# Patient Record
Sex: FEMALE | Race: WHITE | NOT HISPANIC OR LATINO | Employment: OTHER | URBAN - NONMETROPOLITAN AREA
[De-identification: names, ages, dates, MRNs, and addresses within clinical notes are randomized per-mention and may not be internally consistent; named-entity substitution may affect disease eponyms.]

---

## 2020-07-28 ENCOUNTER — APPOINTMENT (OUTPATIENT)
Dept: MRI IMAGING | Facility: OTHER | Age: 85
End: 2020-07-28
Attending: EMERGENCY MEDICINE
Payer: MEDICARE

## 2020-07-28 ENCOUNTER — APPOINTMENT (OUTPATIENT)
Dept: CT IMAGING | Facility: OTHER | Age: 85
End: 2020-07-28
Attending: EMERGENCY MEDICINE
Payer: MEDICARE

## 2020-07-28 ENCOUNTER — HOSPITAL ENCOUNTER (OUTPATIENT)
Facility: OTHER | Age: 85
Setting detail: OBSERVATION
Discharge: HOME | End: 2020-07-30
Attending: EMERGENCY MEDICINE | Admitting: INTERNAL MEDICINE
Payer: MEDICARE

## 2020-07-28 ENCOUNTER — APPOINTMENT (OUTPATIENT)
Dept: GENERAL RADIOLOGY | Facility: OTHER | Age: 85
End: 2020-07-28
Attending: EMERGENCY MEDICINE
Payer: MEDICARE

## 2020-07-28 DIAGNOSIS — R11.2 NON-INTRACTABLE VOMITING WITH NAUSEA, UNSPECIFIED VOMITING TYPE: ICD-10-CM

## 2020-07-28 DIAGNOSIS — Z79.899 ENCOUNTER FOR LONG-TERM (CURRENT) USE OF OTHER MEDICATIONS: ICD-10-CM

## 2020-07-28 DIAGNOSIS — R11.2 NAUSEA AND VOMITING, INTRACTABILITY OF VOMITING NOT SPECIFIED, UNSPECIFIED VOMITING TYPE: ICD-10-CM

## 2020-07-28 DIAGNOSIS — R42 DIZZINESS: ICD-10-CM

## 2020-07-28 DIAGNOSIS — N30.00 ACUTE CYSTITIS WITHOUT HEMATURIA: Primary | ICD-10-CM

## 2020-07-28 DIAGNOSIS — N10 ACUTE PYELONEPHRITIS: ICD-10-CM

## 2020-07-28 DIAGNOSIS — I65.02 STENOSIS OF LEFT VERTEBRAL ARTERY: ICD-10-CM

## 2020-07-28 PROBLEM — R55 SYNCOPE: Status: ACTIVE | Noted: 2020-07-28

## 2020-07-28 PROBLEM — N39.0 UTI (URINARY TRACT INFECTION): Status: ACTIVE | Noted: 2020-07-28

## 2020-07-28 PROBLEM — E83.52 HYPERCALCEMIA: Status: ACTIVE | Noted: 2019-02-22

## 2020-07-28 LAB
ALBUMIN UR-MCNC: 10 MG/DL
ANION GAP SERPL CALCULATED.3IONS-SCNC: 9 MMOL/L (ref 3–14)
APPEARANCE UR: CLEAR
APTT PPP: <20 SEC (ref 22–37)
BACTERIA #/AREA URNS HPF: ABNORMAL /HPF
BASOPHILS # BLD AUTO: 0 10E9/L (ref 0–0.2)
BASOPHILS NFR BLD AUTO: 0.7 %
BILIRUB UR QL STRIP: NEGATIVE
BUN SERPL-MCNC: 24 MG/DL (ref 7–25)
CALCIUM SERPL-MCNC: 10.1 MG/DL (ref 8.6–10.3)
CHLORIDE SERPL-SCNC: 104 MMOL/L (ref 98–107)
CO2 SERPL-SCNC: 25 MMOL/L (ref 21–31)
COLOR UR AUTO: YELLOW
CREAT SERPL-MCNC: 0.95 MG/DL (ref 0.6–1.2)
DIFFERENTIAL METHOD BLD: NORMAL
EOSINOPHIL # BLD AUTO: 0.1 10E9/L (ref 0–0.7)
EOSINOPHIL NFR BLD AUTO: 2.4 %
ERYTHROCYTE [DISTWIDTH] IN BLOOD BY AUTOMATED COUNT: 12.4 % (ref 10–15)
GFR SERPL CREATININE-BSD FRML MDRD: 56 ML/MIN/{1.73_M2}
GLUCOSE SERPL-MCNC: 140 MG/DL (ref 70–105)
GLUCOSE UR STRIP-MCNC: NEGATIVE MG/DL
HCT VFR BLD AUTO: 41.3 % (ref 35–47)
HGB BLD-MCNC: 13.3 G/DL (ref 11.7–15.7)
HGB UR QL STRIP: NEGATIVE
IMM GRANULOCYTES # BLD: 0 10E9/L (ref 0–0.4)
IMM GRANULOCYTES NFR BLD: 0.6 %
INR PPP: 0.97 (ref 0–1.3)
KETONES UR STRIP-MCNC: NEGATIVE MG/DL
LACTATE BLD-SCNC: 1.3 MMOL/L (ref 0.7–2)
LEUKOCYTE ESTERASE UR QL STRIP: ABNORMAL
LYMPHOCYTES # BLD AUTO: 1.8 10E9/L (ref 0.8–5.3)
LYMPHOCYTES NFR BLD AUTO: 32.9 %
MAGNESIUM SERPL-MCNC: 2 MG/DL (ref 1.9–2.7)
MCH RBC QN AUTO: 29.2 PG (ref 26.5–33)
MCHC RBC AUTO-ENTMCNC: 32.2 G/DL (ref 31.5–36.5)
MCV RBC AUTO: 91 FL (ref 78–100)
MONOCYTES # BLD AUTO: 0.3 10E9/L (ref 0–1.3)
MONOCYTES NFR BLD AUTO: 6.2 %
MUCOUS THREADS #/AREA URNS LPF: PRESENT /LPF
NEUTROPHILS # BLD AUTO: 3.1 10E9/L (ref 1.6–8.3)
NEUTROPHILS NFR BLD AUTO: 57.2 %
NITRATE UR QL: POSITIVE
PH UR STRIP: 5.5 PH (ref 5–7)
PLATELET # BLD AUTO: 176 10E9/L (ref 150–450)
POTASSIUM SERPL-SCNC: 4.3 MMOL/L (ref 3.5–5.1)
PROTHROMBIN TIME: 11.5 S (ref 11.9–15.2)
RBC # BLD AUTO: 4.55 10E12/L (ref 3.8–5.2)
RBC #/AREA URNS AUTO: 2 /HPF (ref 0–2)
SODIUM SERPL-SCNC: 138 MMOL/L (ref 134–144)
SOURCE: ABNORMAL
SP GR UR STRIP: >1.035 (ref 1–1.03)
SQUAMOUS #/AREA URNS AUTO: 3 /HPF (ref 0–1)
TROPONIN I SERPL-MCNC: 6.1 PG/ML
UROBILINOGEN UR STRIP-MCNC: NORMAL MG/DL (ref 0–2)
WBC # BLD AUTO: 5.4 10E9/L (ref 4–11)
WBC #/AREA URNS AUTO: 38 /HPF (ref 0–5)

## 2020-07-28 PROCEDURE — 85025 COMPLETE CBC W/AUTO DIFF WBC: CPT | Performed by: EMERGENCY MEDICINE

## 2020-07-28 PROCEDURE — 72125 CT NECK SPINE W/O DYE: CPT

## 2020-07-28 PROCEDURE — 96365 THER/PROPH/DIAG IV INF INIT: CPT | Performed by: EMERGENCY MEDICINE

## 2020-07-28 PROCEDURE — 96375 TX/PRO/DX INJ NEW DRUG ADDON: CPT | Mod: XU | Performed by: EMERGENCY MEDICINE

## 2020-07-28 PROCEDURE — 83735 ASSAY OF MAGNESIUM: CPT | Performed by: EMERGENCY MEDICINE

## 2020-07-28 PROCEDURE — 83605 ASSAY OF LACTIC ACID: CPT | Performed by: EMERGENCY MEDICINE

## 2020-07-28 PROCEDURE — 87088 URINE BACTERIA CULTURE: CPT | Performed by: INTERNAL MEDICINE

## 2020-07-28 PROCEDURE — 93005 ELECTROCARDIOGRAM TRACING: CPT | Performed by: EMERGENCY MEDICINE

## 2020-07-28 PROCEDURE — 25000132 ZZH RX MED GY IP 250 OP 250 PS 637: Mod: GY | Performed by: INTERNAL MEDICINE

## 2020-07-28 PROCEDURE — 71045 X-RAY EXAM CHEST 1 VIEW: CPT

## 2020-07-28 PROCEDURE — 25800030 ZZH RX IP 258 OP 636: Performed by: EMERGENCY MEDICINE

## 2020-07-28 PROCEDURE — 25000128 H RX IP 250 OP 636: Performed by: EMERGENCY MEDICINE

## 2020-07-28 PROCEDURE — 70498 CT ANGIOGRAPHY NECK: CPT

## 2020-07-28 PROCEDURE — 85610 PROTHROMBIN TIME: CPT | Performed by: EMERGENCY MEDICINE

## 2020-07-28 PROCEDURE — 85730 THROMBOPLASTIN TIME PARTIAL: CPT | Performed by: EMERGENCY MEDICINE

## 2020-07-28 PROCEDURE — G0378 HOSPITAL OBSERVATION PER HR: HCPCS

## 2020-07-28 PROCEDURE — 70551 MRI BRAIN STEM W/O DYE: CPT

## 2020-07-28 PROCEDURE — 96376 TX/PRO/DX INJ SAME DRUG ADON: CPT | Mod: XU | Performed by: EMERGENCY MEDICINE

## 2020-07-28 PROCEDURE — 87086 URINE CULTURE/COLONY COUNT: CPT | Performed by: INTERNAL MEDICINE

## 2020-07-28 PROCEDURE — 84484 ASSAY OF TROPONIN QUANT: CPT | Performed by: EMERGENCY MEDICINE

## 2020-07-28 PROCEDURE — 36415 COLL VENOUS BLD VENIPUNCTURE: CPT | Performed by: EMERGENCY MEDICINE

## 2020-07-28 PROCEDURE — 25800030 ZZH RX IP 258 OP 636: Performed by: INTERNAL MEDICINE

## 2020-07-28 PROCEDURE — 99285 EMERGENCY DEPT VISIT HI MDM: CPT | Mod: 25 | Performed by: EMERGENCY MEDICINE

## 2020-07-28 PROCEDURE — 93010 ELECTROCARDIOGRAM REPORT: CPT | Performed by: INTERNAL MEDICINE

## 2020-07-28 PROCEDURE — 81003 URINALYSIS AUTO W/O SCOPE: CPT | Performed by: EMERGENCY MEDICINE

## 2020-07-28 PROCEDURE — 99220 ZZC INITIAL OBSERVATION CARE,LEVL III: CPT | Performed by: INTERNAL MEDICINE

## 2020-07-28 PROCEDURE — 80048 BASIC METABOLIC PNL TOTAL CA: CPT | Performed by: EMERGENCY MEDICINE

## 2020-07-28 PROCEDURE — 99285 EMERGENCY DEPT VISIT HI MDM: CPT | Mod: Z6 | Performed by: EMERGENCY MEDICINE

## 2020-07-28 PROCEDURE — 25500064 ZZH RX 255 OP 636: Performed by: EMERGENCY MEDICINE

## 2020-07-28 RX ORDER — CEFTRIAXONE SODIUM 1 G/50ML
1 INJECTION, SOLUTION INTRAVENOUS EVERY 24 HOURS
Status: DISCONTINUED | OUTPATIENT
Start: 2020-07-29 | End: 2020-07-30 | Stop reason: HOSPADM

## 2020-07-28 RX ORDER — ONDANSETRON 2 MG/ML
4 INJECTION INTRAMUSCULAR; INTRAVENOUS ONCE
Status: COMPLETED | OUTPATIENT
Start: 2020-07-28 | End: 2020-07-28

## 2020-07-28 RX ORDER — SODIUM CHLORIDE 9 MG/ML
INJECTION, SOLUTION INTRAVENOUS CONTINUOUS
Status: DISCONTINUED | OUTPATIENT
Start: 2020-07-28 | End: 2020-07-28

## 2020-07-28 RX ORDER — HEPARIN SODIUM 5000 [USP'U]/.5ML
60 INJECTION, SOLUTION INTRAVENOUS; SUBCUTANEOUS ONCE
Status: DISCONTINUED | OUTPATIENT
Start: 2020-07-28 | End: 2020-07-28

## 2020-07-28 RX ORDER — ASPIRIN 81 MG/1
81 TABLET ORAL AT BEDTIME
COMMUNITY
End: 2020-08-25

## 2020-07-28 RX ORDER — IODIXANOL 320 MG/ML
100 INJECTION, SOLUTION INTRAVASCULAR ONCE
Status: COMPLETED | OUTPATIENT
Start: 2020-07-28 | End: 2020-07-28

## 2020-07-28 RX ORDER — LORAZEPAM 2 MG/ML
0.5 INJECTION INTRAMUSCULAR ONCE
Status: COMPLETED | OUTPATIENT
Start: 2020-07-28 | End: 2020-07-28

## 2020-07-28 RX ORDER — LISINOPRIL 20 MG/1
TABLET ORAL
COMMUNITY
Start: 2020-02-26 | End: 2020-08-25

## 2020-07-28 RX ORDER — LUTEIN 6 MG
6 TABLET ORAL
COMMUNITY

## 2020-07-28 RX ORDER — NALOXONE HYDROCHLORIDE 0.4 MG/ML
.1-.4 INJECTION, SOLUTION INTRAMUSCULAR; INTRAVENOUS; SUBCUTANEOUS
Status: DISCONTINUED | OUTPATIENT
Start: 2020-07-28 | End: 2020-07-30 | Stop reason: HOSPADM

## 2020-07-28 RX ORDER — CEFTRIAXONE SODIUM 1 G/50ML
1 INJECTION, SOLUTION INTRAVENOUS ONCE
Status: COMPLETED | OUTPATIENT
Start: 2020-07-28 | End: 2020-07-28

## 2020-07-28 RX ORDER — ONDANSETRON 2 MG/ML
4 INJECTION INTRAMUSCULAR; INTRAVENOUS ONCE
Status: DISCONTINUED | OUTPATIENT
Start: 2020-07-28 | End: 2020-07-28

## 2020-07-28 RX ORDER — ASPIRIN 81 MG/1
324 TABLET, CHEWABLE ORAL ONCE
Status: DISCONTINUED | OUTPATIENT
Start: 2020-07-28 | End: 2020-07-28

## 2020-07-28 RX ORDER — LORAZEPAM 2 MG/ML
1 INJECTION INTRAMUSCULAR ONCE
Status: DISCONTINUED | OUTPATIENT
Start: 2020-07-28 | End: 2020-07-28

## 2020-07-28 RX ORDER — ATORVASTATIN CALCIUM 10 MG/1
10 TABLET, FILM COATED ORAL
Status: ON HOLD | COMMUNITY
Start: 2020-04-23 | End: 2020-07-30

## 2020-07-28 RX ORDER — LIDOCAINE 40 MG/G
CREAM TOPICAL
Status: DISCONTINUED | OUTPATIENT
Start: 2020-07-28 | End: 2020-07-30 | Stop reason: HOSPADM

## 2020-07-28 RX ORDER — PROCHLORPERAZINE 25 MG
12.5 SUPPOSITORY, RECTAL RECTAL EVERY 12 HOURS PRN
Status: DISCONTINUED | OUTPATIENT
Start: 2020-07-28 | End: 2020-07-30 | Stop reason: HOSPADM

## 2020-07-28 RX ORDER — ONDANSETRON 4 MG/1
4 TABLET, ORALLY DISINTEGRATING ORAL EVERY 6 HOURS PRN
Status: DISCONTINUED | OUTPATIENT
Start: 2020-07-28 | End: 2020-07-30 | Stop reason: HOSPADM

## 2020-07-28 RX ORDER — ASPIRIN 81 MG/1
81 TABLET ORAL DAILY
Status: DISCONTINUED | OUTPATIENT
Start: 2020-07-28 | End: 2020-07-30 | Stop reason: HOSPADM

## 2020-07-28 RX ORDER — SODIUM CHLORIDE 9 MG/ML
INJECTION, SOLUTION INTRAVENOUS CONTINUOUS
Status: DISCONTINUED | OUTPATIENT
Start: 2020-07-28 | End: 2020-07-29

## 2020-07-28 RX ORDER — CHOLECALCIFEROL (VITAMIN D3) 50 MCG
2000 TABLET ORAL
COMMUNITY
Start: 2019-03-23

## 2020-07-28 RX ORDER — ONDANSETRON 2 MG/ML
4 INJECTION INTRAMUSCULAR; INTRAVENOUS EVERY 6 HOURS PRN
Status: DISCONTINUED | OUTPATIENT
Start: 2020-07-28 | End: 2020-07-30 | Stop reason: HOSPADM

## 2020-07-28 RX ORDER — ACETAMINOPHEN 325 MG/1
650 TABLET ORAL EVERY 4 HOURS PRN
Status: DISCONTINUED | OUTPATIENT
Start: 2020-07-28 | End: 2020-07-30 | Stop reason: HOSPADM

## 2020-07-28 RX ORDER — HYDROCHLOROTHIAZIDE 12.5 MG/1
12.5 CAPSULE ORAL
COMMUNITY
Start: 2020-02-11 | End: 2020-08-25

## 2020-07-28 RX ORDER — SENNOSIDES 8.6 MG
1300 CAPSULE ORAL
COMMUNITY

## 2020-07-28 RX ORDER — PROCHLORPERAZINE MALEATE 5 MG
5 TABLET ORAL EVERY 6 HOURS PRN
Status: DISCONTINUED | OUTPATIENT
Start: 2020-07-28 | End: 2020-07-30 | Stop reason: HOSPADM

## 2020-07-28 RX ADMIN — SODIUM CHLORIDE: 9 INJECTION, SOLUTION INTRAVENOUS at 16:38

## 2020-07-28 RX ADMIN — ASPIRIN 81 MG: 81 TABLET, DELAYED RELEASE ORAL at 20:11

## 2020-07-28 RX ADMIN — LORAZEPAM 0.5 MG: 2 INJECTION, SOLUTION INTRAMUSCULAR; INTRAVENOUS at 11:06

## 2020-07-28 RX ADMIN — CEFTRIAXONE SODIUM 1 G: 1 INJECTION, SOLUTION INTRAVENOUS at 15:30

## 2020-07-28 RX ADMIN — SODIUM CHLORIDE: 9 INJECTION, SOLUTION INTRAVENOUS at 13:14

## 2020-07-28 RX ADMIN — ONDANSETRON 4 MG: 2 INJECTION INTRAMUSCULAR; INTRAVENOUS at 15:32

## 2020-07-28 RX ADMIN — IODIXANOL 100 ML: 320 INJECTION, SOLUTION INTRAVASCULAR at 12:07

## 2020-07-28 RX ADMIN — ONDANSETRON 4 MG: 2 INJECTION INTRAMUSCULAR; INTRAVENOUS at 10:48

## 2020-07-28 RX ADMIN — SODIUM CHLORIDE: 9 INJECTION, SOLUTION INTRAVENOUS at 10:55

## 2020-07-28 NOTE — H&P
[facility]    History and Physical  Hospitalist       Date of Admission:  7/28/2020    Assessment & Plan   Bernarda Ramírez is a 85 year old female who presents with syncopal episode.    Principal Problem:    Syncope    Assessment: Possibly vasovagal versus orthostatic hypotension from taking nitroglycerin.  No evidence of CVA or seizure.    Plan:   -Telemetry   -check orthostatics  -Rule out for ACS overnight with serial troponins  -TTE in a.m.      UTI (urinary tract infection)    Assessment: Noted bacteriuria and given her nausea and vomiting and intermittent dysuria will empirically treat.    Plan:   -IV ceftriaxone day 1  -Bladder scan to ensure adequate emptying  -Follow-up urine culture      Stenosis of left vertebral artery    Assessment: Incidentally noted and no further persistent vertiginous symptoms and no evidence of posterior CVA but will benefit from addition of an aspirin daily.    Plan:   -Start ASA 81 mg daily  -Recheck lipid panel in a.m. and consider up titration of Lipitor    Active Problems:    Essential hypertension    Assessment: stable    Plan:   -Hold home ACE and hydrochlorothiazide      Hypercalcemia    Assessment: Now resolved    Plan:   -Monitor    FEN: regular diet, normal saline at 100 mL/hr  PPX: early ambulation    Code Status: Full Code    Cullen Hansen    Primary Care Physician   Physician No Ref-Primary    Chief Complaint   syncope    History is obtained from the patient and chart review.    History of Present Illness   Bernarda Ramírez is a 85 year old female who presents with syncopal episode.  Patient has been her normal state of health and this morning felt well, she ate 2 fried eggs and toast and then went to the living room to watch TV with her friend that lives with her.  She developed some epigastric and chest pain took 1 of her friends nitros felt immediately lightheaded and had multiple episodes of vomiting.    She subsequently had a brief syncopal  episode and EMS noted twelve-lead EKG concerning for ST elevation V1-V3 and STEMI was activated.  Upon presentation to the ER  It was felt she had concave ST changes consistent with known left bundle branch block and STEMI was called off.    She underwent MRI of the brain after CTA results reviewed, was started on IV ceftriaxone and gentle IV fluids, symptoms improved but she was subsequently placed on observation for further management.    Past Medical History    I have reviewed this patient's medical history and updated it with pertinent information if needed.   History reviewed. No pertinent past medical history.    Past Surgical History   I have reviewed this patient's surgical history and updated it with pertinent information if needed.  Past Surgical History:   Procedure Laterality Date     ECTOPIC PREGNANCY SURGERY         Prior to Admission Medications   Prior to Admission Medications   Prescriptions Last Dose Informant Patient Reported? Taking?   Lutein 6 MG TABS 7/27/2020 at 0700  Yes Yes   Sig: Take 6 mg by mouth   acetaminophen (TYLENOL) 650 MG CR tablet Past Week at Unknown time  Yes Yes   Sig: Take 1,300 mg by mouth nightly as needed    aspirin 81 MG EC tablet 7/26/2020 at hs  Yes Yes   Sig: Take 81 mg by mouth At Bedtime   atorvastatin (LIPITOR) 10 MG tablet 7/26/2020 at hs  Yes Yes   Sig: Take 10 mg by mouth   hydrochlorothiazide (MICROZIDE) 12.5 MG capsule 7/27/2020 at 0700  Yes Yes   Sig: Take 12.5 mg by mouth   lisinopril (ZESTRIL) 20 MG tablet 7/27/2020 at 0700  Yes Yes   Sig: TAKE 1 TABLET BY MOUTH ONCE DAILY   vitamin D3 (CHOLECALCIFEROL) 50 mcg (2000 units) tablet 7/27/2020 at 0700  Yes Yes   Sig: Take 2,000 Units by mouth      Facility-Administered Medications: None     Allergies   Allergies   Allergen Reactions     Sulfa Drugs Unknown       Social History   I have reviewed this patient's social history and updated it with pertinent information if needed. Bernarda Ramírez  reports that she has  never smoked. She has never used smokeless tobacco. She reports previous alcohol use. She reports that she does not use drugs.    Family History   I have reviewed this patient's family history and updated it with pertinent information if needed.   History reviewed. No pertinent family history.    Review of Systems     REVIEW OF SYSTEMS:    Constitutional: normal energy and appetite, no recent sick contacts, no flulike symptoms.    Eyes: no changes in vision or headaches.  Ears, nose, mouth, throat, and face: no mouth sores, dysphagia, or odynophagia  Respiratory: no shortness of breath, cough, or wheezing. No aspiration symptoms.   Cardiovascular: no further chest pain, palpitations, orthopnea, increased lower extremity edema, no prior episodes of syncope.    Gastrointestinal: no constipation, diarrhea, nausea, vomiting or abdominal pain.  Genitourinary: no new dysuria, hematuria, urgency or frequency.   Hematologic/lymphatic: no unintentional weight loss or night sweats.  Musculoskeletal: no pain to extremities, or her back or hips.    Neurological: no new weakness, tingling, numbness.   Endocrine: not a known diabetic.     Physical Exam   Temp: 96.9  F (36.1  C) Temp src: Tympanic BP: 136/51 Pulse: 64 Heart Rate: 67 Resp: 16 SpO2: 98 % O2 Device: None (Room air)    Vital Signs with Ranges  Temp:  [96.9  F (36.1  C)-97.8  F (36.6  C)] 96.9  F (36.1  C)  Pulse:  [58-74] 64  Heart Rate:  [56-78] 67  Resp:  [5-20] 16  BP: ()/(50-92) 136/51  SpO2:  [89 %-99 %] 98 %  159 lbs 0 oz    Exam:  GENERAL: Talkative, sitting up in the bed, in no apparent distress.  Head: normocephalic and atraumatic  Eyes: anicteric and non-injected sclera  Nose: no rhinorrhea or epistaxis.   Throat: moist mucous membranes with no active oral lesions.  NECK: Supple, jugular venous distension not present.  CARDIOVASCULAR: regular rate and rhythm, no murmurs, rubs, or gallops. Normal S1/S2. No lower extremity edema.   RESPIRATORY: clear to  auscultation bilaterally, no wheezes, no crackles.    GI: soft, non-tender, non-distended, normoactive bowel sounds.  : no suprapubic pain with palpation.   MUSCULOSKELETAL: warm and well perfused, 2+ dorsalis pedis pulses.  No cv tenderness.   SKIN: no pallor, jaundice or rashes.  NEUROLOGY: AAOx3, follows commands, speech and language without focal deficits.     Data   Data reviewed today:  I personally reviewed the EKG tracing showing Normal sinus rhythm, left bundle branch block with concave appearing 2 mm ST elevations V1-V3, no other ST inversions or depressions and no significant changes in comparison to prior.  Recent Labs   Lab 07/28/20  1050   WBC 5.4   HGB 13.3   MCV 91      INR 0.97      POTASSIUM 4.3   CHLORIDE 104   CO2 25   BUN 24   CR 0.95   ANIONGAP 9   VARGHESE 10.1   *       Recent Results (from the past 24 hour(s))   XR Chest Port 1 View    Narrative    PROCEDURE: XR CHEST PORT 1 VW 7/28/2020 11:09 AM    HISTORY: cp    COMPARISONS: None.    TECHNIQUE: Single view.    FINDINGS: Heart and pulmonary vasculature are normal. Lungs are clear  and no pleural effusion is seen.    Degenerative changes seen in the spine.         Impression    IMPRESSION: No acute infiltrate.    ALISSA MAGALLON MD   CT Cervical Spine w/o Contrast    Narrative    PROCEDURE: CT CERVICAL SPINE W/O CONTRAST     HISTORY: C-spine trauma, high clinical risk (NEXUS/CCR).    TECHNIQUE: Helical noncontrast CT images of the cervical spine.    COMPARISON: None.    FINDINGS:     No acute fracture is identified. The vertebral bodies are normal in  height. The cervical lordosis is straightened. The C1-2 articulation  and the craniocervical junction are degenerated but intact. Diffuse  advanced degenerative changes are present. There is ankylosis of  C4-C5.    The paravertebral soft tissues are notable for heterogeneity of the  thyroid gland. The lung apices are clear.      Impression    IMPRESSION: No evidence of acute  cervical spine fracture.    ELOY EMANUEL MD   CTA Head Neck with Contrast    Narrative    CT ANGIOGRAPHY OF THE BRAIN AND NECK    HISTORY: . Vertigo, persistent, central.    TECHNIQUE: Noncontrast axial images of the brain were obtained from  the foramen magnum to the vertex.  Following the administration of  intravenous contrast, thin helical CT angiography images of the brain  were obtained.  Postcontrast helical thin CT angiography images of the  neck were obtained.  NASCET criteria were applied. Source, multiplanar  and MIP reformatted images were reviewed.     COMPARISON: None.    FINDINGS:    CT brain: No acute hemorrhage, abnormal extra-axial fluid collection,  hydrocephalus or midline shift is present. The ventricles and sulci  are prominent, compatible with mild, age-appropriate volume loss. The  basal cisterns are patent.     The grey-white matter interface is preserved. Scattered  hypoattenuating lesions are seen within the supratentorial subcortical  and periventricular white matter, representing age-indeterminate  microvascular ischemic change.     The calvarium is intact.  An exostosis is present near the junction of  the frontal and temporal bones on the left. Atherosclerotic  calcifications are noted.    CTA Brain:      Atherosclerotic calcification is seen in the cavernous carotids and  terminal vertebral arteries.     The petrous, cavernous, and supraclinoid internal carotid arteries  demonstrate no high-grade, flow limiting stenosis. The A1 segments are  symmetric. An anterior communicating artery is present. The distal KEY  vessels are unremarkable. The M1 segments, MCA bifurcations and distal  MCA vessels are patent.    There is severe narrowing of the nondominant left V4 segment. The left  PICA is not well seen.    CTA Neck:     A 3 vessel aortic arch is present. The innominate and bilateral  subclavian arteries demonstrate atherosclerotic disease without  flow-limiting stenosis.    The  common carotid arteries demonstrate preserved caliber. The carotid  bulbs demonstrate moderate atherosclerosis and less than 50%  measurable stenosis bilaterally. The bilateral internal carotid  arteries demonstrate no evidence of flow-limiting stenosis or  occlusion.    The vertebral arteries arise from the subclavian arteries. The right  vertebral artery is dominant. There is severe origin stenosis of the  left vertebral artery.       Impression    IMPRESSION:    No acute intracranial hemorrhage or CT evidence of transcortical  ischemia. Microvascular ischemic changes are technically age  indeterminant.    Severe origin stenosis of the nondominant left vertebral artery.  Severe stenosis or focal occlusion within the left V4 segment.  Nonvisualization of the left PICA. A component of vertebrobasilar  insufficiency is suspected. Consider MR brain to assess for posterior  fossa (left PICA) ischemia if clinically warranted.    ELOY EMANUEL MD   MR Brain w/o Contrast    Narrative    EXAM:  MR BRAIN W/O CONTRAST    HISTORY:  dizziness, nausea, vomiting concernign for posterior CVA. .    TECHNIQUE:  Sagittal T1, axial T2, FLAIR and diffusion noncontrast  imaging of the brain.    COMPARISON:  CTA earlier today     FINDINGS:    Prominence of the ventricles and sulci is compatible with moderate,  generalized volume loss. No abnormal extra-axial collection,  hydrocephalus, mass effect or midline shift is seen. The basal  cisterns are preserved.    Patchy T2/FLAIR hyperintense signal within the supratentorial white  matter is most compatible with moderate chronic microvascular ischemic  change. Allowing for motion artifact, no restricted diffusion is  present.      The T1 marrow signal is unremarkable.       Impression    IMPRESSION: No restricted diffusion to suggest acute ischemia.    ELOY EMANUEL MD

## 2020-07-28 NOTE — ED TRIAGE NOTES
Patient presenting to ER via EMS. EMS states this AM around 0930 patient was very nauseous, passed out, and hit the back of her head.  witnessed this. Patient did not know how long she was passed out for. In EMS, patient received 1 Nitroglycerin, 324 mg of aspirin, and 2 mg of IV Morphine. She denies chest pain.

## 2020-07-28 NOTE — PROGRESS NOTES
[facility] [facility] [place]    Patient admitted to room 311 at approximately 1620 via cart from emergency room. Patient was accompanied by daughter Radha.     Verbal SBAR report received from CHASE Mckeon prior to patient arrival.     Patient ambulated to bed independently. Patient alert and oriented X 3. The patient is not having any pain. 0-10 Pain Scale: 0. Admission vital signs: Blood pressure (!) 153/92, pulse 69, temperature 97.8  F (36.6  C), temperature source Tympanic, resp. rate 12, weight 72.1 kg (159 lb), SpO2 98 %. Patient was oriented to plan of care, call light, bed controls, tv, telephone, bathroom and visiting hours.     Risk Assessment    The following safety risks were identified during admission: fall. Yellow risk band applied: YES.     Skin Initial Assessment    This writer admitted this patient and completed a full skin assessment and Jamie score in the Adult PCS flowsheet. Appropriate interventions initiated as needed.            Education    Patient has a [place] to Observation order: Yes  Observation education completed and documented: Yes  Shown in ER    Carol Patel RN

## 2020-07-28 NOTE — ED PROVIDER NOTES
[facility]  Emergency Department Visit Note    Chest Pain      History of Present Illness     HPI:  Bernarda Ramírez is a 85 year old old female presenting by EMS as a STEMI alert.  The patient woke up this morning feeling well.  She ate breakfast and as she got up from the table she felt very dizzy needed and vomited.  She passed out falling backwards and striking the back of her head.  According to family members she did not have prolonged loss of consciousness and did wake up and continued to complain of nausea and vomiting.  On EMS arrival they did obtain a twelve-lead which was concerning for ST elevations in V1 and V3 and a STEMI alert was activated.  In route she received a full dose aspirin, Zofran and nitroglycerin.  Currently the patient is complaining of dizziness and nausea.  She has vomited multiple times in route.  Prior to her arrival I was able to access previous EKGs which noted a left bundle branch block.  This was noted back in 2010.  On her arrival the EMS ECG is consistent with left bundle branch block without ST elevation or concerning signs for ischemia.  STEMI alert was called off.  Patient has no history of previous MIs or CVAs.  She has has in the past but never as severe and never is prolonged.  Had symptoms like this she does not complain of a headache.  She does have some neck tenderness and a c-collar had been placed by EMS due to her syncope and neck pain.  Patient was not noted to have a facial droop, numbness or tingling in any of her extremities or weakness.  She has been articulate without any difficulty speaking. There is no recent history of seizure activity or confusion.    Medications:  Prior to Admission medications    Medication Sig Last Dose Taking? Auth Provider   acetaminophen (TYLENOL) 650 MG CR tablet Take 1,300 mg by mouth Past Week at Unknown time Yes Reported, Patient   atorvastatin (LIPITOR) 10 MG tablet Take 10 mg by mouth Past Week at Unknown  time Yes Reported, Patient   lisinopril (ZESTRIL) 20 MG tablet TAKE 1 TABLET BY MOUTH ONCE DAILY 7/28/2020 at AM Yes Reported, Patient   Lutein 6 MG TABS Take 6 mg by mouth Past Month at Unknown time Yes Reported, Patient   vitamin D3 (CHOLECALCIFEROL) 50 mcg (2000 units) tablet Take 2,000 Units by mouth 7/28/2020 at AM Yes Reported, Patient   hydrochlorothiazide (MICROZIDE) 12.5 MG capsule Take 12.5 mg by mouth Unknown at Unknown time  Reported, Patient       Allergies:  Allergies   Allergen Reactions     Sulfa Drugs Unknown       Problem List:  There is no problem list on file for this patient.      Past Medical History:  History reviewed. No pertinent past medical history.    Past Surgical History:  History reviewed. No pertinent surgical history.    Social History:  Social History     Tobacco Use     Smoking status: Never Smoker     Smokeless tobacco: Never Used   Substance Use Topics     Alcohol use: Not Currently     Drug use: Never       Review of Systems:  10 point review of systems obtained and pertinent positive and negative findings noted in HPI. Review of systems otherwise negative.      Physical Exam     Vital signs: /83   Pulse 74   Temp 97.8  F (36.6  C) (Tympanic)   Resp 12   Wt 72.1 kg (159 lb)   SpO2 97%     Physical Exam:    General: awake and alert, ill appearing, uncomfortable. Retching intermittently  HEENT: atraumatic, no scleral injection, no nasal discharge, neck supple  Chest: clear to auscultation bilaterally without wheezes or crackles, non labored respirations, symmetric chest rise  Cardiovascular: regular rate and rhythm, no murmurs or gallops  Abdomen: soft, nontender, no rebound or guarding, nondistended  Extremities: no deformities, edema, or tenderness  Skin: warm, dry, no rashes  Neuro  National Institutes of Health Stroke Scale  Exam Interval: Baseline   Score    Level of consciousness: (0)   Alert, keenly responsive    LOC questions: (0)   Answers both questions  correctly    LOC commands: (0)   Performs both tasks correctly    Best gaze: (0)   Normal    Visual: (0)   No visual loss    Facial palsy: (0)   Normal symmetrical movements    Motor arm (left): (0)   No drift    Motor arm (right): (0)   No drift    Motor leg (left): (0)   No drift    Motor leg (right): (0)   No drift    Limb ataxia: (0)   Absent    Sensory: (0)   Normal- no sensory loss    Best language: (0)   Normal- no aphasia    Dysarthria: (0)   Normal    Extinction and inattention: (0)   No abnormality        Total Score:  0       Medical Decision Making & ED Course     Patient presents with dizziness, nausea, vomiting.  Acute coronary syndrome cannot be excluded and will obtain serial troponins to rule out an STEMI.  Most concerning in her differential is differential posterior cerebrovascular accident, transient ischemic attack, intracranial hemorrhage, intracranial mass, or possible metabolic derangement or sepsis.. Neurologic exam is normal for at time of emergency department evaluation but with history is most concerning for  posterior cerebrovascular accident. With this leading differential, patient requires emergent imaging of brain and vessels of the head and neck to evaluate for any ischemia, hemorrhage, mass, arterial stenosis, arterial dissection, arterial occlusion.   ED Course as of Jul 28 1503   Tue Jul 28, 2020   1455 COMPARISON:  CTA earlier today      FINDINGS:    Prominence of the ventricles and sulci is compatible with moderate,  generalized volume loss. No abnormal extra-axial collection,  hydrocephalus, mass effect or midline shift is seen. The basal  cisterns are preserved.     Patchy T2/FLAIR hyperintense signal within the supratentorial white  matter is most compatible with moderate chronic microvascular ischemic  change. Allowing for motion artifact, no restricted diffusion is  present.       The T1 marrow signal is unremarkable.                                                                        IMPRESSION: No restricted diffusion to suggest acute ischemia.      1501 FINDINGS:    Prominence of the ventricles and sulci is compatible with moderate,  generalized volume loss. No abnormal extra-axial collection,  hydrocephalus, mass effect or midline shift is seen. The basal  cisterns are preserved.     Patchy T2/FLAIR hyperintense signal within the supratentorial white  matter is most compatible with moderate chronic microvascular ischemic  change. Allowing for motion artifact, no restricted diffusion is  present.       The T1 marrow signal is unremarkable.                                                                       IMPRESSION: No restricted diffusion to suggest acute ischemia.      1501 Nitrite Urine(!): Positive   1501 Leukocyte Esterase Urine(!): Large   1501 Patient is still nauseated. Likely pyelonephritis as underlying cause  of symptoms. Will start rocephin. Case discussed with dr Hansen, will transfer to observation   WBC Urine(!): 38     I have reviewed the patient's ECG(s), Lab(s), Medical Imaging and Medical Records.    Diagnosis & Disposition     Diagnosis:  1. Dizziness    2. Non-intractable vomiting with nausea, unspecified vomiting type    3. Acute pyelonephritis        Disposition:  Admit    MD Mustapha Mobley Theresa M, MD  07/28/20 1501

## 2020-07-28 NOTE — PLAN OF CARE
Denies nausea, denies chest pain since admission to medical floor. She is afebrile with stable VS. Voided just prior to coming to m/s/p. Will get post-void residual bladder scan after next void. Daughter Radha was at bedside at time of admission, but has since left. Bed alarm on and patient reminded to call for restroom due to fall risk and passing out at home this morning.     Carol Patel RN on 7/28/2020 at 6:09 PM

## 2020-07-28 NOTE — PHARMACY-ADMISSION MEDICATION HISTORY
Pharmacy -- Admission Medication Reconciliation    Prior to admission (PTA) medications were reviewed and the patient's PTA medication list was updated.    Sources Consulted: [employer]s, care Everywhere, patient on telephone    The reliability of this Medication Reconciliation is: Reliability: Reliable    The following significant changes were made:  Added hs prn to apap  Added hs/daily to atorvastatin  Added daily to hydrochlorothiazide  Added daily to lutein  Added daily to vitamin d  Added asa ec at hs    In addition, the patient's allergies were reviewed with the patient and left as follows:   Allergies: Sulfa drugs    The pharmacist has reviewed with the patient that all personal medications should be removed from the building or locked in the belongings safe.  Patient shall only take medications ordered by the physician and administered by the nursing staff.       Medication barriers identified: none noted   Medication adherence concerns: none noted   Understanding of emergency medications: n/a    Vicki Zhu [facility], 7/28/2020,  4:54 PM

## 2020-07-29 ENCOUNTER — APPOINTMENT (OUTPATIENT)
Dept: CARDIOLOGY | Facility: OTHER | Age: 85
End: 2020-07-29
Attending: INTERNAL MEDICINE
Payer: MEDICARE

## 2020-07-29 LAB
CHOLEST SERPL-MCNC: 138 MG/DL
ERYTHROCYTE [DISTWIDTH] IN BLOOD BY AUTOMATED COUNT: 12.6 % (ref 10–15)
HCT VFR BLD AUTO: 38.2 % (ref 35–47)
HDLC SERPL-MCNC: 46 MG/DL (ref 23–92)
HGB BLD-MCNC: 12.2 G/DL (ref 11.7–15.7)
LDLC SERPL CALC-MCNC: 79 MG/DL
MCH RBC QN AUTO: 29.3 PG (ref 26.5–33)
MCHC RBC AUTO-ENTMCNC: 31.9 G/DL (ref 31.5–36.5)
MCV RBC AUTO: 92 FL (ref 78–100)
NONHDLC SERPL-MCNC: 92 MG/DL
PLATELET # BLD AUTO: 178 10E9/L (ref 150–450)
RBC # BLD AUTO: 4.16 10E12/L (ref 3.8–5.2)
TRIGL SERPL-MCNC: 65 MG/DL
TROPONIN I SERPL-MCNC: 11.1 PG/ML
TROPONIN I SERPL-MCNC: 11.4 PG/ML
WBC # BLD AUTO: 6.6 10E9/L (ref 4–11)

## 2020-07-29 PROCEDURE — 25800030 ZZH RX IP 258 OP 636: Performed by: INTERNAL MEDICINE

## 2020-07-29 PROCEDURE — 93306 TTE W/DOPPLER COMPLETE: CPT

## 2020-07-29 PROCEDURE — 99226 ZZC SUBSEQUENT OBSERVATION CARE,LEVEL III: CPT | Performed by: INTERNAL MEDICINE

## 2020-07-29 PROCEDURE — 36415 COLL VENOUS BLD VENIPUNCTURE: CPT | Performed by: INTERNAL MEDICINE

## 2020-07-29 PROCEDURE — 84484 ASSAY OF TROPONIN QUANT: CPT | Mod: 91 | Performed by: INTERNAL MEDICINE

## 2020-07-29 PROCEDURE — 93306 TTE W/DOPPLER COMPLETE: CPT | Mod: 26 | Performed by: INTERNAL MEDICINE

## 2020-07-29 PROCEDURE — 25000132 ZZH RX MED GY IP 250 OP 250 PS 637: Mod: GY | Performed by: INTERNAL MEDICINE

## 2020-07-29 PROCEDURE — 85027 COMPLETE CBC AUTOMATED: CPT | Performed by: INTERNAL MEDICINE

## 2020-07-29 PROCEDURE — 99225 ZZC SUBSEQUENT OBSERVATION CARE,LEVEL II: CPT | Performed by: INTERNAL MEDICINE

## 2020-07-29 PROCEDURE — 80061 LIPID PANEL: CPT | Performed by: INTERNAL MEDICINE

## 2020-07-29 PROCEDURE — 25000128 H RX IP 250 OP 636: Performed by: INTERNAL MEDICINE

## 2020-07-29 PROCEDURE — G0378 HOSPITAL OBSERVATION PER HR: HCPCS

## 2020-07-29 RX ORDER — ATORVASTATIN CALCIUM 10 MG/1
10 TABLET, FILM COATED ORAL EVERY EVENING
Status: DISCONTINUED | OUTPATIENT
Start: 2020-07-30 | End: 2020-07-30 | Stop reason: HOSPADM

## 2020-07-29 RX ORDER — LISINOPRIL 20 MG/1
20 TABLET ORAL DAILY
Status: DISCONTINUED | OUTPATIENT
Start: 2020-07-29 | End: 2020-07-30 | Stop reason: HOSPADM

## 2020-07-29 RX ADMIN — LISINOPRIL 20 MG: 20 TABLET ORAL at 10:04

## 2020-07-29 RX ADMIN — SODIUM CHLORIDE: 9 INJECTION, SOLUTION INTRAVENOUS at 01:03

## 2020-07-29 RX ADMIN — ASPIRIN 81 MG: 81 TABLET, DELAYED RELEASE ORAL at 19:40

## 2020-07-29 NOTE — PROGRESS NOTES
[facility]    Hospitalist Progress Note      Assessment & Plan   Bernarda Ramírez is a 85 year old female who was admitted on 7/28/2020.     Principal Problem:    Syncope    Assessment: Possibly vasovagal versus orthostatic hypotension from taking nitroglycerin.  No evidence of CVA or seizure.    Plan:   -Telemetry wnl  -orthostatics wnl  -Ruled out for ACS overnight with serial troponins  -TTE with asymptomatic MR       UTI (urinary tract infection)    Assessment: complicated by urinary retention likely from cystitis. Noted bacteriuria. No resolved nausea and vomiting with active treatment.     Plan:   -IV ceftriaxone day 2  -flores with trial of void as outpatient   -Follow-up urine culture       Stenosis of left vertebral artery    Assessment: Incidentally noted and no further persistent vertiginous symptoms and no evidence of posterior CVA but will benefit from addition of an aspirin daily.    Plan:   -Start ASA 81 mg daily  -LDL of 79 and no significant change and can consider up titration of Lipitor as an outpatient     Active Problems:    Essential hypertension    Assessment: stable    Plan:   -resume home ACE   - hold hydrochlorothiazide and given age and renal function can consider discontinue     FEN: regular diet, discontinue normal saline  PPX: early ambulation     Code Status: Full Code    Cullen Hansen    Interval History   Overnight no acute events and afebrile, feeling significantly improved, no further nausea or vomiting, able to tolerate regular diet, Flores catheter is comfortable but she still feels like she has to pee intermittently, no increased lower extremity edema chest pain palpitations orthopnea or other complaints.    -Data reviewed today: I reviewed all new labs and imaging results over the last 24 hours. I personally reviewed no images or EKG's today.    Physical Exam   Temp: 98.7  F (37.1  C) Temp src: Tympanic BP: 138/57 Pulse: 78 Heart Rate: 72 Resp: 16 SpO2: 94 % O2  Device: None (Room air)    Vitals:    07/28/20 1050   Weight: 72.1 kg (159 lb)     Vital Signs with Ranges  Temp:  [96.9  F (36.1  C)-99.1  F (37.3  C)] 98.7  F (37.1  C)  Pulse:  [60-78] 78  Heart Rate:  [61-79] 72  Resp:  [14-18] 16  BP: (130-153)/(44-92) 138/57  SpO2:  [94 %-99 %] 94 %  I/O last 3 completed shifts:  In: 1685 [P.O.:480; I.V.:1205]  Out: 1375 [Urine:1375]    Exam:  GENERAL: Talkative, sitting up in the bed, in no apparent distress.  CARDIOVASCULAR: regular rate and rhythm, no murmurs, rubs, or gallops. Normal S1/S2. No lower extremity edema.   RESPIRATORY: clear to auscultation bilaterally, no wheezes, no crackles.    GI: soft, non-tender, non-distended, normoactive bowel sounds.  : no suprapubic pain with palpation.  Merritt in place with clear yellow urine.   MUSCULOSKELETAL: warm and well perfused, 2+ dorsalis pedis pulses.  No cv tenderness.   SKIN: no pallor, jaundice or rashes.  NEUROLOGY: AAOx3, follows commands, speech and language without focal deficits.     Medications       aspirin  81 mg Oral Daily     atorvastatin  10 mg Oral QPM     cefTRIAXone  1 g Intravenous Q24H     lisinopril  20 mg Oral Daily     sodium chloride (PF)  3 mL Intracatheter Q8H       Data   Recent Labs   Lab 07/29/20  0500 07/28/20  1050   WBC 6.6 5.4   HGB 12.2 13.3   MCV 92 91    176   INR  --  0.97   NA  --  138   POTASSIUM  --  4.3   CHLORIDE  --  104   CO2  --  25   BUN  --  24   CR  --  0.95   ANIONGAP  --  9   VARGHESE  --  10.1   GLC  --  140*       Recent Results (from the past 24 hour(s))   MR Brain w/o Contrast    Narrative    EXAM:  MR BRAIN W/O CONTRAST    HISTORY:  dizziness, nausea, vomiting concernign for posterior CVA. .    TECHNIQUE:  Sagittal T1, axial T2, FLAIR and diffusion noncontrast  imaging of the brain.    COMPARISON:  CTA earlier today     FINDINGS:    Prominence of the ventricles and sulci is compatible with moderate,  generalized volume loss. No abnormal extra-axial  collection,  hydrocephalus, mass effect or midline shift is seen. The basal  cisterns are preserved.    Patchy T2/FLAIR hyperintense signal within the supratentorial white  matter is most compatible with moderate chronic microvascular ischemic  change. Allowing for motion artifact, no restricted diffusion is  present.      The T1 marrow signal is unremarkable.       Impression    IMPRESSION: No restricted diffusion to suggest acute ischemia.    ELOY EMANUEL MD   Echocardiogram Complete    Narrative    [id number]  [id number]  [id number]  [id number]^EBONY^MARIO ALBERTO^ALICE        [facility]  [address]  [place] [address]     Name: JAY MALONE  MRN: [id number]  : 10/07/1934  Study Date: 2020 08:09 AM  Age: 85 yrs  Gender: Female  Patient Location: [facility]  Reason For Study: Syncope  Ordering Physician: MARIO ALBERTO BECK  Performed By: Amie Adams RDCS, BETHT  Weight: 159 lb  HR: 92  BP: 144/59 mmHg     _____________________________________________________________________________  __     Procedure  Complete Portable Echo Adult.     _____________________________________________________________________________  __     Interpretation Summary  Global and regional left ventricular function is normal with an EF of 60-65%.  Right ventricular function, chamber size, wall motion, and thickness are  normal.  Prolapse of posterior mitral leaflet with moderate eccentric anteriorly  directed MR.  Right ventricular systolic pressure is 65mmHg above the right atrial pressure.  The inferior vena cava is normal.  No pericardial effusion is present.  Previous study not available for comparison.     _____________________________________________________________________________  __     Left Ventricle  Global and regional left ventricular function is normal with an EF of 60-65%.  Left ventricular wall thickness is normal. Left ventricular size is normal.  Diastolic function not assessed due to significant valvular  regurgitation.        Right Ventricle  Right ventricular function, chamber size, wall motion, and thickness are  normal.     Atria  The right atria appears normal. Moderate left atrial enlargement is present.  The atrial septum is intact as assessed by color Doppler .     Mitral Valve  Prolapse of posterior mitral leaflet with moderate eccentric anteriorly  directed MR.     Aortic Valve  Moderate aortic valve calcification is present. The mean AoV pressure gradient  is 10.5 mmHg. The calculated aortic valve are is 1.5 cm^2.     Tricuspid Valve  The tricuspid valve is normal. Mild tricuspid insufficiency is present. Right  ventricular systolic pressure is 65mmHg above the right atrial pressure.        Pulmonic Valve  The pulmonic valve is normal.     Vessels  The thoracic aorta is normal. The pulmonary artery and bifurcation cannot be  assessed. The inferior vena cava is normal.     Pericardium  No pericardial effusion is present.     Compared to Previous Study  Previous study not available for comparison.     _____________________________________________________________________________  __  MMode/2D Measurements & Calculations  IVSd: 0.80 cm  LVIDd: 4.7 cm  LVIDs: 3.1 cm  LVPWd: 0.94 cm  FS: 33.9 %  LV mass(C)d: 137.4 grams  asc Aorta Diam: 3.3 cm  LVOT diam: 2.0 cm  LVOT area: 3.1 cm2  LA Volume (BP): 47.2 ml  RWT: 0.40           Doppler Measurements & Calculations  MV E max jah: 146.0 cm/sec  MV A max jah: 142.0 cm/sec  MV E/A: 1.0  MV dec time: 0.13 sec  Ao V2 max: 217.0 cm/sec  Ao max P.0 mmHg  Ao V2 mean: 155.0 cm/sec  Ao mean PG: 10.5 mmHg  Ao V2 VTI: 48.2 cm  MARIELLE(I,D): 1.5 cm2  MARIELLE(V,D): 1.5 cm2  LV V1 max P.4 mmHg  LV V1 max: 105.0 cm/sec  LV V1 VTI: 23.7 cm  SV(LVOT): 74.5 ml  TR max jah: 402.0 cm/sec  TR max P.6 mmHg  AV Jah Ratio (DI): 0.48  E/E' av.7  Lateral E/e': 15.8  Medial E/e': 17.7              _____________________________________________________________________________  __         Report approved by: Renuka Connelly 07/29/2020 11:28 AM           I personally discussed patient's care with daughter Ila, all questions answered to the best of ability and very appreciative of care.

## 2020-07-29 NOTE — PLAN OF CARE
Problem: Adult Inpatient Plan of Care  Goal: Optimal Comfort and Wellbeing  7/29/2020 0540 by Isabell Fitch RN  Outcome: Improving  7/29/2020 0539 by Isabell Fitch RN  Outcome: No Change  7/29/2020 0534 by Isabell Fitch RN  Outcome: No Change     Patient actively doing Sudoku puzzles intermittently between plenty rest/nap periods.

## 2020-07-29 NOTE — PROGRESS NOTES
Patient is alert and orientated. Assist x1 with gait belt and walker. Orthostatic blood pressures were negative. Voided 200ml at 0719 and bladder scanned over 350ml so Merritt was inserted per MD order. Merritt is patent and measured 300ml urine at 0800. Denies any pain. BP (!) 144/59 (BP Location: Right arm)   Pulse 77   Temp 98.8  F (37.1  C) (Tympanic)   Resp 18   Wt 72.1 kg (159 lb)   SpO2 98%      Preet Levine RN

## 2020-07-29 NOTE — PROGRESS NOTES
SAFETY CHECKLIST  ID Bands and Risk clasps correct and in place (DNR, Fall risk, Allergy, Latex, Limb):  Yes  All Lines Reconciled and labeled correctly: Yes  Whiteboard updated:Yes  Environmental interventions (bed/chair alarm on, call light, side rails, restraints, sitter....): Yes  Verify Tele #: [id number]    Isabell Fitch RN on 7/28/2020 at 8:03 PM

## 2020-07-29 NOTE — PLAN OF CARE
VSS. Denies pain. No new changes. Will continue to monitor      Isabell Fitch RN on 7/29/2020 at 12:09 AM

## 2020-07-29 NOTE — PLAN OF CARE
Patient washed and performed oral cares this AM independently after assist of one to get up. At 1145 patient transferred from bed to chair with assist of one and use of walker. Gait was steady.  Problem: Adult Inpatient Plan of Care  Goal: Patient-Specific Goal (Individualization)  7/29/2020 0540 by Isabell Fitch RN  Outcome: Improving  7/29/2020 0539 by Isabell Fitch RN  Outcome: No Change  7/29/2020 0534 by Isabell Fitch RN  Outcome: No Change

## 2020-07-29 NOTE — PROGRESS NOTES
:    Met with patient in room to discuss discharge planning needs.  Patient will be discharged with a flores catheter and will most likely need home care services for nursing and PT. It was mentioned earlier that her friend works at [employer] and wanted us to make a referral to that home care.  I called [facility] and made referral.  [facility] had me call Amira in admission at [phone] to see if they could do trial and void and she stated they were not accepting anyone in this area right now for any services. I met with patient in room again and she chose [facility] from list.  I made referral and called [facility].  [facility] stated they will be able to accept patient and discharge.  I asked about the trial and void and she recommended patient follow up with urology.  I will inform charge nurse so she can set that appointment up.  Patient gave me verbal permission to call her friend (Maria) who was here earlier and inform her of this. Anticipated discharge tomorrow.    ORI Thorpe on 7/29/2020 at 1:24 PM

## 2020-07-29 NOTE — PROGRESS NOTES
Pt Bladder scanned and 415 PVR. MD notified. Orders to straight cath once. If Pt continues to retain urine >350 order to put in flores catheter for retention. Will continue to monitor.    Isabell Fitch RN on 7/28/2020 at 8:43 PM

## 2020-07-29 NOTE — PROGRESS NOTES
SAFETY CHECKLIST  ID Bands and Risk clasps correct and in place (DNR, Fall risk, Allergy, Latex, Limb):  Yes  All Lines Reconciled and labeled correctly: Yes  Whiteboard updated:Yes  Environmental interventions (bed/chair alarm on, call light, side rails, restraints, sitter....): Yes  Verify Tele #: [phone]

## 2020-07-29 NOTE — PLAN OF CARE
Pt is A&O x4. Is pleasant and cooperative. Assist x1 with gaitbelt and walker. VSS. /49   Pulse 77   Temp 99  F (37.2  C) (Tympanic)   Resp 18   Wt 72.1 kg (159 lb)   SpO2 97%   LS diminished. Denies SOB or chest pain. HR regular. BS active. Pt retaining urine. Bladder scanned and was 415 PVR. Straight cath per MD. After straight cath and Pt continue to retain order to put in flores cath >350 mL. Denies lightheadedness or dizziness. Will continue to monitor.    Isabell Fitch RN on 7/29/2020 at 5:38 AM

## 2020-07-29 NOTE — PLAN OF CARE
Patient has denied pain all shift and has had stable gait with minimal one assist transfer/walking.   Problem: Adult Inpatient Plan of Care  Goal: Readiness for Transition of Care  7/29/2020 0540 by Isabell Fitch RN  Outcome: Improving  7/29/2020 0539 by Isabell Fitch RN  Outcome: No Change  7/29/2020 0534 by Isabell Fitch RN  Outcome: No Change  Intervention: Mutually Develop Transition Plan  7/29/2020 1142 by Renuka Oconnor RN  Flowsheets (Taken 7/29/2020 1142)  Outpatient/Agency/Support Group Needs: homecare agency  Patient/Family Anticipated Services at Transition: home health care  Patient's Choice of Community Agency(s): [facility]  Patient/Family Anticipates Transition to: home with help/services  Offered/Gave Vendor List: (per ss) yes  Equipment Needed After Discharge: walker, rolling

## 2020-07-30 VITALS
OXYGEN SATURATION: 96 % | TEMPERATURE: 98 F | WEIGHT: 159.61 LBS | DIASTOLIC BLOOD PRESSURE: 66 MMHG | RESPIRATION RATE: 18 BRPM | HEART RATE: 80 BPM | SYSTOLIC BLOOD PRESSURE: 150 MMHG

## 2020-07-30 LAB
BACTERIA SPEC CULT: ABNORMAL
SPECIMEN SOURCE: ABNORMAL

## 2020-07-30 PROCEDURE — 25000128 H RX IP 250 OP 636: Performed by: INTERNAL MEDICINE

## 2020-07-30 PROCEDURE — G0378 HOSPITAL OBSERVATION PER HR: HCPCS

## 2020-07-30 PROCEDURE — 96376 TX/PRO/DX INJ SAME DRUG ADON: CPT

## 2020-07-30 PROCEDURE — 99217 ZZC OBSERVATION CARE DISCHARGE: CPT | Performed by: INTERNAL MEDICINE

## 2020-07-30 PROCEDURE — 25000132 ZZH RX MED GY IP 250 OP 250 PS 637: Mod: GY | Performed by: INTERNAL MEDICINE

## 2020-07-30 RX ORDER — CEFUROXIME AXETIL 500 MG/1
500 TABLET ORAL 2 TIMES DAILY
Qty: 6 TABLET | Refills: 0 | Status: SHIPPED | OUTPATIENT
Start: 2020-07-30 | End: 2020-08-05

## 2020-07-30 RX ORDER — ATORVASTATIN CALCIUM 20 MG/1
20 TABLET, FILM COATED ORAL DAILY
Qty: 90 TABLET | Refills: 3 | Status: SHIPPED | OUTPATIENT
Start: 2020-07-30 | End: 2021-07-29

## 2020-07-30 RX ADMIN — LISINOPRIL 20 MG: 20 TABLET ORAL at 10:26

## 2020-07-30 RX ADMIN — CEFTRIAXONE SODIUM 1 G: 1 INJECTION, SOLUTION INTRAVENOUS at 10:33

## 2020-07-30 RX ADMIN — ATORVASTATIN CALCIUM 10 MG: 10 TABLET, FILM COATED ORAL at 08:49

## 2020-07-30 NOTE — PROGRESS NOTES
SAFETY CHECKLIST  ID Bands and Risk clasps correct and in place (DNR, Fall risk, Allergy, Latex, Limb):  Yes  All Lines Reconciled and labeled correctly: Yes  Whiteboard updated:Yes  Environmental interventions (bed/chair alarm on, call light, side rails, restraints, sitter....): Yes  Verify Tele #: #[phone]

## 2020-07-30 NOTE — PROGRESS NOTES
:     Met with patient to discuss discharge plans.  Patient's daughter will transport home today.  She will call her daughter to pick her up when she is ready for discharge.  She stated that her daughter will stay with her for awhile.      Notified [facility] of discharge plans.  Will fax orders when available.    ORI Rodriguez on 7/30/2020 at 11:15 AM    Addendum:     Faxed discharge orders to [facility].    ORI Rodriguez on 7/30/2020 at 3:02 PM

## 2020-07-30 NOTE — DISCHARGE SUMMARY
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   Patient has been her normal state of health until morning of admit, she ate 2 fried eggs and toast and then went to the living room to watch TV with her friend that lives with her.  She developed some epigastric and chest pain took 1 of her friends nitros felt immediately lightheaded and had multiple episodes of vomiting.     She subsequently had a brief syncopal episode and EMS noted twelve-lead EKG concerning for ST elevation V1-V3 and STEMI was activated.  Upon presentation to the ER  It was felt she had concave ST changes consistent with known left bundle branch block and STEMI was called off.     She underwent MRI of the brain after CTA results reviewed, was started on IV ceftriaxone and gentle IV fluids, symptoms improved but she was subsequently placed on observation for further management.    Hospital Course   Bernarda Ramírez was admitted on 7/28/2020.  The following problems were addressed during her hospitalization:    Syncope: Possibly vasovagal versus orthostatic hypotension from taking nitroglycerin and active infection.  No evidence of CVA or seizure.  Telemetry was otherwise within normal limits, repeat orthostatics after discharge were normal, she was ruled out for ACS overnight with serial troponins and no change in EKG.  She underwent echocardiogram with asymptomatic MR no other significant changes.  She is opted for home care and face-to-face was completed and  prescription for 4 wheeled walker was also provided.       UTI (urinary tract infection): Bacteriuria and dysuria on admit, she was initially started on IV ceftriaxone and was also noted to have urinary retention likely from cystitis.   With treatment of her UTI nausea and vomiting resolved, urine culture is reviewed and she will complete a full 7-day course of antibiotics with Ceftin as an outpatient, she will have trial of void in urology clinic as scheduled.         Stenosis of left vertebral artery: Incidentally noted and no further persistent vertiginous symptoms and no evidence of posterior CVA but will benefit from addition of a 81 mg aspirin daily.  Repeat LDL was 79 and she will increase her Lipitor from 10-20 nightly.       Essential hypertension: Stable and she will otherwise resume her home lisinopril and HCTZ.  Given her age and renal function changing from a thiazide to a different agent can be considered at outpatient follow-up.    Cullen Hansen MD    Significant Results and Procedures   none    Pending Results   These results will be followed up by pcp  Unresulted Labs Ordered in the Past 30 Days of this Admission     No orders found from 6/28/2020 to 7/29/2020.          Code Status   Full Code       Primary Care Physician   Physician No Ref-Primary    Physical Exam   Temp: 98  F (36.7  C) Temp src: Tympanic BP: (!) 150/66 Pulse: 80 Heart Rate: 74 Resp: 18 SpO2: 96 % O2 Device: None (Room air)    Vitals:    07/28/20 1050 07/30/20 0500   Weight: 72.1 kg (159 lb) 72.4 kg (159 lb 9.8 oz)     Vital Signs with Ranges  Temp:  [96.6  F (35.9  C)-98.8  F (37.1  C)] 98  F (36.7  C)  Pulse:  [68-80] 80  Heart Rate:  [63-80] 74  Resp:  [16-18] 18  BP: (122-160)/(51-66) 150/66  SpO2:  [96 %-98 %] 96 %  I/O last 3 completed shifts:  In: 1521 [P.O.:960; I.V.:561]  Out: 1750 [Urine:1750]    Exam on day of discharge:   GENERAL: Talkative, in no apparent distress.  CARDIOVASCULAR: regular rate and rhythm, no murmurs,  rubs, or gallops. Normal S1/S2. No lower extremity edema.   RESPIRATORY: clear to auscultation bilaterally, no wheezes, no crackles.   GI: soft, non-tender, non-distended, normoactive bowel sounds.  Flores catheter in place with clear yellow urine.  MUSCULOSKELETAL: warm and well perfused, 2+ dorsalis pedis pulses bilaterally.    SKIN: no pallor,jaundice, or rashes    Discharge Disposition   Discharged to home with home care  Condition at discharge: Stable    Consultations This Hospital Stay   SOCIAL WORK IP CONSULT  SOCIAL WORK IP CONSULT    Time Spent on this Encounter   ICullen MD, personally saw the patient today and spent less than or equal to 30 minutes discharging this patient.    Discharge Orders      Reason for your hospital stay    Urinary tract infection and inability to empty your bladder     Follow-up and recommended labs and tests     Dr. Thibodeaux 8/5/20 8:45 for trial of void  Follow up with SANDY Yun 8/5/20 at 2:40 pm. For post hospital follow-up     Activity    Your activity upon discharge: activity as tolerated     Discharge Instructions    - take the antibiotic until it is gone to clear up your bladder infection  - increase your Lipitor dose from 10->20mg daily     When to contact your care team    Call your primary doctor if you have any of the following: temperature greater than 101,  increased shortness of breath, increased drainage, increased swelling or increased pain.     Tubes and drains    You are going home with the following tubes or drains: flores catheter.  Tube cares per hospital or home care instructions     MD face to face encounter    Documentation of Face to Face and Certification for Home Health Services    I certify that patient: Bernarda Ramírez is under my care and that I, or a nurse practitioner or physician's assistant working with me, had a face-to-face encounter that meets the physician face-to-face encounter requirements with this patient on: 7/30/2020.    This encounter  with the patient was in whole, or in part, for the following medical condition, which is the primary reason for home health care: GICH.    I certify that, based on my findings, the following services are medically necessary home health services: Nursing and Physical Therapy.    My clinical findings support the need for the above services because: Nurse is needed: To assess vital and flores after changes in medications or other medical regimen.. and Physical Therapy Services are needed to assess and treat the following functional impairments: deconditioning and weakness.    Further, I certify that my clinical findings support that this patient is homebound (i.e. absences from home require considerable and taxing effort and are for medical reasons or [employer] services or infrequently or of short duration when for other reasons) because: Requires assistance of another person or specialized equipment to access medical services because patient: Range of motion limitations prevents ability to exit home safely...    Based on the above findings. I certify that this patient is confined to the home and needs intermittent skilled nursing care, physical therapy and/or speech therapy.  The patient is under my care, and I have initiated the establishment of the plan of care.  This patient will be followed by a physician who will periodically review the plan of care.  Physician/Provider to provide follow up care: No Ref-Primary, Physician    Attending hospital physician (the Medicare certified [place] provider): Cullen Hansen MD  Physician Signature: See electronic signature associated with these discharge orders.  Date: 7/30/2020     Diet    Follow this diet upon discharge: Orders Placed This Encounter      Regular Diet Adult     Discharge Medications   Current Discharge Medication List      START taking these medications    Details   !! aspirin (ASA) 81 MG EC tablet Take 1 tablet (81 mg) by mouth daily  Qty: 90 tablet, Refills: 3     Associated Diagnoses: Stenosis of left vertebral artery      cefuroxime (CEFTIN) 500 MG tablet Take 1 tablet (500 mg) by mouth 2 times daily for 3 days  Qty: 6 tablet, Refills: 0    Associated Diagnoses: Acute cystitis without hematuria       !! - Potential duplicate medications found. Please discuss with provider.      CONTINUE these medications which have CHANGED    Details   atorvastatin (LIPITOR) 20 MG tablet Take 1 tablet (20 mg) by mouth daily  Qty: 90 tablet, Refills: 3    Associated Diagnoses: Encounter for long-term (current) use of other medications         CONTINUE these medications which have NOT CHANGED    Details   acetaminophen (TYLENOL) 650 MG CR tablet Take 1,300 mg by mouth nightly as needed       !! aspirin 81 MG EC tablet Take 81 mg by mouth At Bedtime      hydrochlorothiazide (MICROZIDE) 12.5 MG capsule Take 12.5 mg by mouth      lisinopril (ZESTRIL) 20 MG tablet TAKE 1 TABLET BY MOUTH ONCE DAILY      Lutein 6 MG TABS Take 6 mg by mouth      vitamin D3 (CHOLECALCIFEROL) 50 mcg (2000 units) tablet Take 2,000 Units by mouth       !! - Potential duplicate medications found. Please discuss with provider.        Allergies   Allergies   Allergen Reactions     Sulfa Drugs Unknown     Data   Most Recent 3 CBC's:  Recent Labs   Lab Test 07/29/20  0500 07/28/20  1050   WBC 6.6 5.4   HGB 12.2 13.3   MCV 92 91    176      Most Recent 3 BMP's:  Recent Labs   Lab Test 07/28/20  1050      POTASSIUM 4.3   CHLORIDE 104   CO2 25   BUN 24   CR 0.95   ANIONGAP 9   VARGHESE 10.1   *     Most Recent 2 LFT's:No lab results found.  Most Recent INR's and Anticoagulation Dosing History:  Anticoagulation Dose History     Recent Dosing and Labs Latest Ref Rng & Units 7/28/2020    INR 0 - 1.3 0.97        Most Recent 3 Troponin's:No lab results found.  Most Recent Cholesterol Panel:  Recent Labs   Lab Test 07/29/20  0408   CHOL 138   LDL 79   HDL 46   TRIG 65     Most Recent 6 Bacteria Isolates From Any  Culture (See EPIC Reports for Culture Details):  Recent Labs   Lab Test 07/28/20  1324   CULT >100,000 colonies/mL  Escherichia coli  *     Most Recent TSH, T4 and A1c Labs:No lab results found.  Results for orders placed or performed during the hospital encounter of 07/28/20   CT Cervical Spine w/o Contrast    Narrative    PROCEDURE: CT CERVICAL SPINE W/O CONTRAST     HISTORY: C-spine trauma, high clinical risk (NEXUS/CCR).    TECHNIQUE: Helical noncontrast CT images of the cervical spine.    COMPARISON: None.    FINDINGS:     No acute fracture is identified. The vertebral bodies are normal in  height. The cervical lordosis is straightened. The C1-2 articulation  and the craniocervical junction are degenerated but intact. Diffuse  advanced degenerative changes are present. There is ankylosis of  C4-C5.    The paravertebral soft tissues are notable for heterogeneity of the  thyroid gland. The lung apices are clear.      Impression    IMPRESSION: No evidence of acute cervical spine fracture.    ELOY EMANUEL MD   CTA Head Neck with Contrast    Narrative    CT ANGIOGRAPHY OF THE BRAIN AND NECK    HISTORY: . Vertigo, persistent, central.    TECHNIQUE: Noncontrast axial images of the brain were obtained from  the foramen magnum to the vertex.  Following the administration of  intravenous contrast, thin helical CT angiography images of the brain  were obtained.  Postcontrast helical thin CT angiography images of the  neck were obtained.  NASCET criteria were applied. Source, multiplanar  and MIP reformatted images were reviewed.     COMPARISON: None.    FINDINGS:    CT brain: No acute hemorrhage, abnormal extra-axial fluid collection,  hydrocephalus or midline shift is present. The ventricles and sulci  are prominent, compatible with mild, age-appropriate volume loss. The  basal cisterns are patent.     The grey-white matter interface is preserved. Scattered  hypoattenuating lesions are seen within the  supratentorial subcortical  and periventricular white matter, representing age-indeterminate  microvascular ischemic change.     The calvarium is intact.  An exostosis is present near the junction of  the frontal and temporal bones on the left. Atherosclerotic  calcifications are noted.    CTA Brain:      Atherosclerotic calcification is seen in the cavernous carotids and  terminal vertebral arteries.     The petrous, cavernous, and supraclinoid internal carotid arteries  demonstrate no high-grade, flow limiting stenosis. The A1 segments are  symmetric. An anterior communicating artery is present. The distal KEY  vessels are unremarkable. The M1 segments, MCA bifurcations and distal  MCA vessels are patent.    There is severe narrowing of the nondominant left V4 segment. The left  PICA is not well seen.    CTA Neck:     A 3 vessel aortic arch is present. The innominate and bilateral  subclavian arteries demonstrate atherosclerotic disease without  flow-limiting stenosis.    The common carotid arteries demonstrate preserved caliber. The carotid  bulbs demonstrate moderate atherosclerosis and less than 50%  measurable stenosis bilaterally. The bilateral internal carotid  arteries demonstrate no evidence of flow-limiting stenosis or  occlusion.    The vertebral arteries arise from the subclavian arteries. The right  vertebral artery is dominant. There is severe origin stenosis of the  left vertebral artery.       Impression    IMPRESSION:    No acute intracranial hemorrhage or CT evidence of transcortical  ischemia. Microvascular ischemic changes are technically age  indeterminant.    Severe origin stenosis of the nondominant left vertebral artery.  Severe stenosis or focal occlusion within the left V4 segment.  Nonvisualization of the left PICA. A component of vertebrobasilar  insufficiency is suspected. Consider MR brain to assess for posterior  fossa (left PICA) ischemia if clinically warranted.    ELOY  MD JENAE   XR Chest Port 1 View    Narrative    PROCEDURE: XR CHEST PORT 1 VW 2020 11:09 AM    HISTORY: cp    COMPARISONS: None.    TECHNIQUE: Single view.    FINDINGS: Heart and pulmonary vasculature are normal. Lungs are clear  and no pleural effusion is seen.    Degenerative changes seen in the spine.         Impression    IMPRESSION: No acute infiltrate.    ALISSA MAGALLON MD   MR Brain w/o Contrast    Narrative    EXAM:  MR BRAIN W/O CONTRAST    HISTORY:  dizziness, nausea, vomiting concernign for posterior CVA. .    TECHNIQUE:  Sagittal T1, axial T2, FLAIR and diffusion noncontrast  imaging of the brain.    COMPARISON:  CTA earlier today     FINDINGS:    Prominence of the ventricles and sulci is compatible with moderate,  generalized volume loss. No abnormal extra-axial collection,  hydrocephalus, mass effect or midline shift is seen. The basal  cisterns are preserved.    Patchy T2/FLAIR hyperintense signal within the supratentorial white  matter is most compatible with moderate chronic microvascular ischemic  change. Allowing for motion artifact, no restricted diffusion is  present.      The T1 marrow signal is unremarkable.       Impression    IMPRESSION: No restricted diffusion to suggest acute ischemia.    ELOY EMANUEL MD   Echocardiogram Complete    Narrative    [id number]  [id number]  [id number]  [id number]^EBONY^MARIO ALBERTO^L        [facility]  [address] [place] [address]     Name: JAY MALONE  MRN: [id number]  : 10/07/1934  Study Date: 2020 08:09 AM  Age: 85 yrs  Gender: Female  Patient Location: [facility]  Reason For Study: Syncope  Ordering Physician: MARIO ALBERTO BECK  Performed By: Amie Adams RDCS, RVT  Weight: 159 lb  HR: 92  BP: 144/59 mmHg     _____________________________________________________________________________  __     Procedure  Complete Portable Echo Adult.     _____________________________________________________________________________  __      Interpretation Summary  Global and regional left ventricular function is normal with an EF of 60-65%.  Right ventricular function, chamber size, wall motion, and thickness are  normal.  Prolapse of posterior mitral leaflet with moderate eccentric anteriorly  directed MR.  Right ventricular systolic pressure is 65mmHg above the right atrial pressure.  The inferior vena cava is normal.  No pericardial effusion is present.  Previous study not available for comparison.     _____________________________________________________________________________  __     Left Ventricle  Global and regional left ventricular function is normal with an EF of 60-65%.  Left ventricular wall thickness is normal. Left ventricular size is normal.  Diastolic function not assessed due to significant valvular regurgitation.        Right Ventricle  Right ventricular function, chamber size, wall motion, and thickness are  normal.     Atria  The right atria appears normal. Moderate left atrial enlargement is present.  The atrial septum is intact as assessed by color Doppler .     Mitral Valve  Prolapse of posterior mitral leaflet with moderate eccentric anteriorly  directed MR.     Aortic Valve  Moderate aortic valve calcification is present. The mean AoV pressure gradient  is 10.5 mmHg. The calculated aortic valve are is 1.5 cm^2.     Tricuspid Valve  The tricuspid valve is normal. Mild tricuspid insufficiency is present. Right  ventricular systolic pressure is 65mmHg above the right atrial pressure.        Pulmonic Valve  The pulmonic valve is normal.     Vessels  The thoracic aorta is normal. The pulmonary artery and bifurcation cannot be  assessed. The inferior vena cava is normal.     Pericardium  No pericardial effusion is present.     Compared to Previous Study  Previous study not available for comparison.     _____________________________________________________________________________  __  MMode/2D Measurements & Calculations  IVSd:  0.80 cm  LVIDd: 4.7 cm  LVIDs: 3.1 cm  LVPWd: 0.94 cm  FS: 33.9 %  LV mass(C)d: 137.4 grams  asc Aorta Diam: 3.3 cm  LVOT diam: 2.0 cm  LVOT area: 3.1 cm2  LA Volume (BP): 47.2 ml  RWT: 0.40           Doppler Measurements & Calculations  MV E max jah: 146.0 cm/sec  MV A max jah: 142.0 cm/sec  MV E/A: 1.0  MV dec time: 0.13 sec  Ao V2 max: 217.0 cm/sec  Ao max P.0 mmHg  Ao V2 mean: 155.0 cm/sec  Ao mean PG: 10.5 mmHg  Ao V2 VTI: 48.2 cm  MARIELLE(I,D): 1.5 cm2  MARIELLE(V,D): 1.5 cm2  LV V1 max P.4 mmHg  LV V1 max: 105.0 cm/sec  LV V1 VTI: 23.7 cm  SV(LVOT): 74.5 ml  TR max jah: 402.0 cm/sec  TR max P.6 mmHg  AV Jah Ratio (DI): 0.48  E/E' av.7  Lateral E/e': 15.8  Medial E/e': 17.7              _____________________________________________________________________________  __        Report approved by: Renuka Connelly 2020 11:28 AM

## 2020-07-30 NOTE — PROGRESS NOTES
[facility] DISCHARGE NOTE    Patient discharged to home at 1250 PM via wheel chair. Accompanied by daughter and staff. Discharge instructions reviewed with patient, opportunity offered to ask questions. Prescriptions sent to patients preferred pharmacy. All belongings sent with patient.    Mahi Lange RN

## 2020-07-30 NOTE — PHARMACY - DISCHARGE MEDICATION RECONCILIATION AND EDUCATION
Pharmacy:  Discharge Counseling and Medication Reconciliation    Bernarda Ramírez  [address] [place] [address]  [phone] (home)   85 year old female  PCP: No Ref-Primary, Physician    Allergies: Sulfa drugs    Discharge Counseling:    Pharmacist met with patient (and/or family) today to review the medication portion of the After Visit Summary (with an emphasis on NEW medications) and to address patient's questions/concerns.    Summary of Education: met with patient to discuss medications.  Discussed administration, duration and side effects of new antibiotic.  Patient was told to increase her atorvastatin and that new rx's for aspirin and atorvastatin were sent to pharmacy.  All questions answered.     Materials Provided:  MedCounselor sheets printed from Clinical Pharmacology on: ceftin    Discharge Medication Reconciliation:    It has been determined that the patient has an adequate supply of medications available or which can be obtained from the patient's preferred pharmacy, which HE/SHE has confirmed as: [facility]     Thank you for the consult.    Leslie Ramirez RPH........July 30, 2020 10:53 AM

## 2020-07-30 NOTE — PLAN OF CARE
Uneventful shift. Lung sounds clear. Pt has a HR murmur. Skin intact. Merritt intact.  BP (!) 148/61 (BP Location: Right arm)   Pulse 80   Temp 96.6  F (35.9  C) (Tympanic)   Resp 18   Wt 72.1 kg (159 lb)   SpO2 96%        Problem: Adult Inpatient Plan of Care  Goal: Plan of Care Review  Outcome: Improving  Goal: Patient-Specific Goal (Individualization)  Outcome: Improving  Goal: Absence of Hospital-Acquired Illness or Injury  Outcome: Improving  Goal: Optimal Comfort and Wellbeing  Outcome: Improving  Goal: Readiness for Transition of Care  Outcome: Improving  Intervention: Mutually Develop Transition Plan  7/29/2020 1142 by Renuka Oconnor, RN  Flowsheets (Taken 7/29/2020 1142)  Outpatient/Agency/Support Group Needs: homecare agency  Patient/Family Anticipated Services at Transition: home health care  Patient's Choice of Community Agency(s): [facility]  Patient/Family Anticipates Transition to: home with help/services  Offered/Gave Vendor List: (per ss) yes  Equipment Needed After Discharge: walker, rolling  Goal: Rounds/Family Conference  Outcome: Improving     Problem: UTI (Urinary Tract Infection)  Goal: Improved Infection Symptoms  Outcome: Improving

## 2020-07-31 ENCOUNTER — PATIENT OUTREACH (OUTPATIENT)
Dept: CARE COORDINATION | Facility: CLINIC | Age: 85
End: 2020-07-31

## 2020-07-31 NOTE — PROGRESS NOTES
Transitional Care Management Phone Call    Summary of hospitalization:  [facility] discharge summary reviewed    DISCHARGE DIAGNOSIS: UTI    DATE OF DISCHARGE: 07-    Diagnostic Tests/Treatments reviewed.  Follow up needed: Void trial with Dr. Thibodeaux    Post Discharge Medication Reconciliation: medication reconcilation previously completed during another office visit. Has home care nurse there now who will reconcile medications.     Medications reviewed by: by myself    Problems taking medications regularly:  None    Problems adhering to non-medication therapy:  None    Other Healthcare Providers Involved in Patient s Care:         Homecare and Specialist appointment - Urology, Dr. Thibodeaux     Update since discharge: improved. Patient says she is improving. Ate breakfast. Will attend appointments.     Plan of care communicated with patient    Just a friendly reminder that you appointment is   Next 5 appointments (look out 90 days)    Aug 05, 2020  2:40 PM CDT  Office Visit with Joyce Yun PA-C  [facility] ([facility]) [address]  [place] [id number] [phone]      .  We encourage you to keep this appointment.    Please remember to bring all of your pills in their bottles (including any vitamins or over the counter pills) with you to your appointment.   The patient indicates understanding of these issues and agrees with the plan of care.   Yes, plans to follow plan of care and keep the scheduled appointment.    Was the patient contacted within the 2 business days or other approved timeframe?  Yes    Was the Medication reconciliation and management done since the patient was discharged? Yes. Home care nurse there now, will reconcile medications.     Mahi Minor RN  7/31/2020 9:59 AM

## 2020-08-04 NOTE — PROGRESS NOTES
Nursing Notes:   Haylee Robb LPN  8/5/2020  9:47 AM  Addendum  Patient presents to clinic for hospital follow up. She states that she is doing great. Patient did see Dr. Olivares earlier this morning.  Haylee Robb LPN ....................  8/5/2020   9:32 AM      SUBJECTIVE:     IVETTE  Bernarda Ramírez is a 85 year old female who presents to clinic today for hospital follow up. Patient was hospitalized from 07/28/2020-07/30/2020 for syncope with associated UTI, left vertebral artery stenosis, and hypertension. Patient presented to the ED after developing epigastric pain and chest pain. She took one of her friend's nitros which caused lightheadedness and multiple episodes of vomiting followed by a brief syncopal episode. In the hospital telemetry and cardiac labs remained unremarkable. She was treated for a UTI with Keflex and discharged with an oral Ceftin. Imaging reveal stenosis of left vertebral artery. Patient's Lipitor was increased from 10 mg to 20 mg once daily and started on aspirin 81 mg once daily. In regards to hypertension, patient is currently taking lisinopril 20 mg once daily and hydrochlorothiazide 12.5 mg once daily. She was started on the hydrochlorothiazide on 02/11/2020 but given her decreased kidney function may consider discontinuing this. She was to follow up with Dr. Thibodeaux as well for a void trial.     Patient presenting today feel well. Notes no continued symptoms. Blood pressure has been doing well. Is 132/78 in clinic today. She does not want to make any medication changes at this time. She followed up with Dr. Thibodeaux this morning and her void trial was unremarkable. She finished her antibiotics for UTI without any concerns. No other concerns today.      Review of Systems   Constitutional: Negative.    Respiratory: Negative.    Cardiovascular: Negative.    Gastrointestinal: Negative.    Genitourinary: Negative.    Neurological: Negative.         PAST MEDICAL HISTORY: History  reviewed. No pertinent past medical history.    PAST SURGICAL HISTORY:   Past Surgical History:   Procedure Laterality Date     ECTOPIC PREGNANCY SURGERY         FAMILY HISTORY: History reviewed. No pertinent family history.    SOCIAL HISTORY:   Social History     Tobacco Use     Smoking status: Never Smoker     Smokeless tobacco: Never Used   Substance Use Topics     Alcohol use: Not Currently        Allergies   Allergen Reactions     Sulfa Drugs Unknown     Current Outpatient Medications   Medication     acetaminophen (TYLENOL) 650 MG CR tablet     aspirin (ASA) 81 MG EC tablet     aspirin 81 MG EC tablet     atorvastatin (LIPITOR) 20 MG tablet     hydrochlorothiazide (MICROZIDE) 12.5 MG capsule     lisinopril (ZESTRIL) 20 MG tablet     Lutein 6 MG TABS     vitamin D3 (CHOLECALCIFEROL) 50 mcg (2000 units) tablet     No current facility-administered medications for this visit.          OBJECTIVE:     /78   Pulse 58   Temp 98.3  F (36.8  C)   Resp 12   Wt 73.4 kg (161 lb 12.8 oz)   Breastfeeding No   There is no height or weight on file to calculate BMI.  Physical Exam  General: Pleasant, in no apparent distress.  Eyes: Sclera are white and conjunctiva are clear bilaterally. Lacrimal apparatus free of erythema, edema, and discharge bilaterally.  Ears: External ears without erythema or edema.   Nose: External nose is symmetrical and free of lesions and deformities.   Cardiovascular: Regular rate and rhythm with S1 equal to S2. No murmurs, friction rubs, or gallops.   Respiratory: Lungs are resonant and clear to auscultation bilaterally. No wheezes, crackles, or rhonchi.  Skin: No jaundice, pallor, rashes, or lesions.  Psych: Appropriate mood and affect.      ASSESSMENT/PLAN:     1. Acute cystitis without hematuria    2. Essential hypertension    3. Syncope, unspecified syncope type    4. Stenosis of left vertebral artery      Patient is doing very well after hospital visit for above diagnoses. No acute  concerns today. Completed antibiotics as prescribed without any concerns. Already followed up with Dr. Thibodeaux this morning. BP has been doing well. Doing well with increase in Lipitor to 20 mg once daily. Follow up as needed.      Jyoti Gonsalez PA-C  [facility] AND [facility]

## 2020-08-05 ENCOUNTER — OFFICE VISIT (OUTPATIENT)
Dept: UROLOGY | Facility: OTHER | Age: 85
End: 2020-08-05
Attending: UROLOGY
Payer: MEDICARE

## 2020-08-05 ENCOUNTER — OFFICE VISIT (OUTPATIENT)
Dept: FAMILY MEDICINE | Facility: OTHER | Age: 85
End: 2020-08-05
Attending: PHYSICIAN ASSISTANT
Payer: MEDICARE

## 2020-08-05 VITALS
DIASTOLIC BLOOD PRESSURE: 78 MMHG | SYSTOLIC BLOOD PRESSURE: 132 MMHG | TEMPERATURE: 98.3 F | HEART RATE: 58 BPM | WEIGHT: 161.8 LBS | RESPIRATION RATE: 12 BRPM

## 2020-08-05 VITALS
WEIGHT: 160 LBS | SYSTOLIC BLOOD PRESSURE: 144 MMHG | DIASTOLIC BLOOD PRESSURE: 80 MMHG | RESPIRATION RATE: 16 BRPM | HEART RATE: 82 BPM

## 2020-08-05 DIAGNOSIS — I10 ESSENTIAL HYPERTENSION: ICD-10-CM

## 2020-08-05 DIAGNOSIS — R55 SYNCOPE, UNSPECIFIED SYNCOPE TYPE: ICD-10-CM

## 2020-08-05 DIAGNOSIS — N30.00 ACUTE CYSTITIS WITHOUT HEMATURIA: Primary | ICD-10-CM

## 2020-08-05 DIAGNOSIS — I65.02 STENOSIS OF LEFT VERTEBRAL ARTERY: ICD-10-CM

## 2020-08-05 DIAGNOSIS — R33.9 URINARY RETENTION: Primary | ICD-10-CM

## 2020-08-05 PROCEDURE — 99213 OFFICE O/P EST LOW 20 MIN: CPT | Performed by: PHYSICIAN ASSISTANT

## 2020-08-05 PROCEDURE — 51798 US URINE CAPACITY MEASURE: CPT | Performed by: UROLOGY

## 2020-08-05 PROCEDURE — G0463 HOSPITAL OUTPT CLINIC VISIT: HCPCS | Mod: 25

## 2020-08-05 PROCEDURE — 99203 OFFICE O/P NEW LOW 30 MIN: CPT | Mod: 25 | Performed by: UROLOGY

## 2020-08-05 PROCEDURE — G0463 HOSPITAL OUTPT CLINIC VISIT: HCPCS | Mod: 25,27

## 2020-08-05 PROCEDURE — 51700 IRRIGATION OF BLADDER: CPT | Performed by: UROLOGY

## 2020-08-05 PROCEDURE — G0463 HOSPITAL OUTPT CLINIC VISIT: HCPCS

## 2020-08-05 ASSESSMENT — ENCOUNTER SYMPTOMS
NEUROLOGICAL NEGATIVE: 1
CONSTITUTIONAL NEGATIVE: 1
CARDIOVASCULAR NEGATIVE: 1
GASTROINTESTINAL NEGATIVE: 1
RESPIRATORY NEGATIVE: 1

## 2020-08-05 ASSESSMENT — PAIN SCALES - GENERAL: PAINLEVEL: NO PAIN (0)

## 2020-08-05 NOTE — NURSING NOTE
Pt presents to clinic for a void trial/urinary retention      Review of Systems:    Weight loss:    Yes     Recent fever/chills:  No   Night sweats:   No  Current skin rash:  No   Recent hair loss:  Yes  Heat intolerance:  No   Cold intolerance:  No  Chest pain:   No   Palpitations:   No  Shortness of breath:  Yes   Wheezing:   No  Constipation:    No   Diarrhea:   No   Nausea:   Yes   Vomiting:   Yes   Kidney/side pain:  No   Back pain:   No  Frequent headaches:  No   Dizziness:     No  Leg swelling:   No   Calf pain:    No    Parents, brothers or sisters with history of kidney cancer:   No  Parents, brothers or sisters with history of bladder cancer: No  Father or brother with history of prostate cancer:  No

## 2020-08-05 NOTE — NURSING NOTE
Patient presents to clinic for hospital follow up. She states that she is doing great. Patient did see Dr. Olivares earlier this morning.  Haylee Robb LPN ....................  8/5/2020   9:32 AM

## 2020-08-05 NOTE — PROGRESS NOTES
Type of Visit  NPV    Chief Complaint  Urinary retention    HPI  Ms Ramírez is a 85 year old female who presents with urinary retention.  Patient was recently admitted for 2 days approximately 1 week ago.  She was diagnosed with a complicated UTI and a catheter was placed.  Catheter was placed 1 week ago.  She has not had catheters placed in the past.  She has not previously had previous pelvic surgery.    She is currently on the following medications for urinary complaints: None.      Past Medical History  She  has no past medical history on file.  Patient Active Problem List   Diagnosis     Breast neoplasm     Bundle branch block     Convulsions (H)     Dyspepsia and disorder of function of stomach     Essential hypertension     Hypercalcemia     Vitamin D deficiency     Syncope     UTI (urinary tract infection)     Stenosis of left vertebral artery       Past Surgical History  She  has a past surgical history that includes Ectopic pregnancy surgery.    Medications  She has a current medication list which includes the following prescription(s): acetaminophen, aspirin, aspirin, atorvastatin, hydrochlorothiazide, lisinopril, lutein, and vitamin d3.    Allergies  Allergies   Allergen Reactions     Sulfa Drugs Unknown       Social History  She  reports that she has never smoked. She has never used smokeless tobacco. She reports previous alcohol use. She reports that she does not use drugs.  No drug abuse.    Family History  History reviewed. No pertinent family history.    Review of Systems  I personally reviewed the ROS with the patient.    Nursing Notes:   Hortencia Gayle LPN  8/5/2020  9:10 AM  Signed  Pt presents to clinic for a void trial/urinary retention      Review of Systems:    Weight loss:    Yes     Recent fever/chills:  No   Night sweats:   No  Current skin rash:  No   Recent hair loss:  Yes  Heat intolerance:  No   Cold intolerance:  No  Chest pain:   No   Palpitations:   No  Shortness of  breath:  Yes   Wheezing:   No  Constipation:    No   Diarrhea:   No   Nausea:   Yes   Vomiting:   Yes   Kidney/side pain:  No   Back pain:   No  Frequent headaches:  No   Dizziness:     No  Leg swelling:   No   Calf pain:    No    Parents, brothers or sisters with history of kidney cancer:   No  Parents, brothers or sisters with history of bladder cancer: No  Father or brother with history of prostate cancer:  No      IrwinHortencia., LPN  8/5/2020  9:34 AM  Signed  Void Trial  Verbal order read back by Cheng Thibodeaux MD to perform void trial and post void residual bladder scan.  Patient's bladder was filled under gravity with 150mL of sterile saline.  Patient voided 200mL.  Post-void residual was measured by ultrasonic bladder scanner: 60 mL      Physical Exam  Vitals:    08/05/20 0853   BP: (!) 144/80   BP Location: Left arm   Patient Position: Sitting   Cuff Size: Adult Large   Pulse: 82   Resp: 16   Weight: 72.6 kg (160 lb)     Constitutional: No acute distress.  Alert and cooperative   Head: NCAT  Eyes: Conjunctivae normal  Cardiovascular: Regular rate.  Pulmonary/Chest: Respirations are even and non-labored bilaterally, no audible wheezing  Abdominal: Soft. No distension, tenderness, masses or guarding.   Neurological: A + O x 3.  Cranial Nerves II-XII grossly intact.  Extremities: FRANCO x 4, Warm. No clubbing.  No cyanosis.    Skin: Pink, warm and dry.  No visible rashes noted.  Psychiatric:  Normal mood and affect  Back:  No left CVA tenderness.  No right CVA tenderness.  Genitourinary:  Catheter in place draining clear urine    Labs  Results for JAY MALONE (MRN [id number]) as of 8/5/2020 12:52   7/28/2020 13:24   Color Urine Yellow   Appearance Urine Clear   Glucose Urine Negative   Bilirubin Urine Negative   Ketones Urine Negative   Specific Gravity Urine >1.035 (H)   pH Urine 5.5   Protein Albumin Urine 10 (A)   Urobilinogen mg/dL Normal   Nitrite Urine Positive (A)   Blood Urine Negative    Leukocyte Esterase Urine Large (A)   Source Midstream Urine   WBC Urine 38 (H)   RBC Urine 2   Bacteria Urine Few (A)   Squamous Epithelial /HPF Urine 3 (H)   Mucous Urine Present (A)   Specimen Description Midstream Urine   Culture Micro >100,000 colonies... (A)       Assessment & Plan  Ms. Ramírez is a 85 year old female who presents with urinary retention requiring catheter placement.  She passed her void trial today.  We discussed that in women passing a void trial has fairly high predictability that she will continue to empty well.  She can follow-up if symptoms worsen.

## 2020-08-05 NOTE — NURSING NOTE
Void Trial  Verbal order read back by Cheng Thibodeaux MD to perform void trial and post void residual bladder scan.  Patient's bladder was filled under gravity with 150mL of sterile saline.  Patient voided 200mL.  Post-void residual was measured by ultrasonic bladder scanner: 60 mL

## 2020-08-14 LAB — INTERPRETATION ECG - MUSE: NORMAL

## 2020-08-25 ENCOUNTER — OFFICE VISIT (OUTPATIENT)
Dept: FAMILY MEDICINE | Facility: OTHER | Age: 85
End: 2020-08-25
Attending: FAMILY MEDICINE
Payer: COMMERCIAL

## 2020-08-25 VITALS
OXYGEN SATURATION: 97 % | WEIGHT: 156.4 LBS | TEMPERATURE: 98.5 F | RESPIRATION RATE: 16 BRPM | HEART RATE: 80 BPM | SYSTOLIC BLOOD PRESSURE: 154 MMHG | DIASTOLIC BLOOD PRESSURE: 82 MMHG

## 2020-08-25 DIAGNOSIS — I45.4 BUNDLE BRANCH BLOCK: ICD-10-CM

## 2020-08-25 DIAGNOSIS — N30.00 ACUTE CYSTITIS WITHOUT HEMATURIA: ICD-10-CM

## 2020-08-25 DIAGNOSIS — I10 ESSENTIAL HYPERTENSION: Primary | ICD-10-CM

## 2020-08-25 PROCEDURE — 99214 OFFICE O/P EST MOD 30 MIN: CPT | Performed by: FAMILY MEDICINE

## 2020-08-25 PROCEDURE — G0463 HOSPITAL OUTPT CLINIC VISIT: HCPCS

## 2020-08-25 RX ORDER — LISINOPRIL 20 MG/1
20 TABLET ORAL DAILY
Qty: 90 TABLET | Refills: 4 | Status: SHIPPED | OUTPATIENT
Start: 2020-08-25 | End: 2021-08-19

## 2020-08-25 ASSESSMENT — PAIN SCALES - GENERAL: PAINLEVEL: MODERATE PAIN (5)

## 2020-08-25 NOTE — PROGRESS NOTES
Nursing Notes:   Shayy Son LPN  8/25/2020  3:25 PM  Signed  Patient is here for medication refill, discuss establishing care. Patient states had been seeing a provider at [facility]. Patient also states she recently was in the hospital for bladder infection.    No LMP recorded (lmp unknown). Patient is postmenopausal.  Medication Reconciliation: complete    Shayy Son, LPN  8/25/2020 2:45 PM      SUBJECTIVE:  Bernarda Ramírez  is a 85 year old female who comes in today for hospital follow-up and to establish care.  She was in the hospital with urinary tract infection in late July.  She had a syncopal episode and MRI of the brain and CTA were negative.  Taken nitro for an episode of chest pain which likely led to her syncopal spell.  Serial troponins and EKGs were negative and echocardiogram showed no significant changes other than some mild mitral regurgitation.  She had some urinary retention that resolved with treatment with antibiotic.  She finished a 7-day course of Ceftin after IV ceftriaxone.  She was started on low-dose aspirin and her Lipitor was increased to 20 mg daily because of incidentally noticed stenosis of her left vertebral artery.  She was continued on lisinopril and hydrochlorothiazide for hypertension.  She saw Dr. Thibodeaux had her catheter removed and she passed a voiding trial.    She did some Home Care PT/OT. She is still doing her exercises.     She had apparently been seeing Chayito Nichols NP and Jeromy Wolff MD at [facility] and wishes to transfer her care here.    Past Medical, Family, and Social History reviewed and updated as noted below.     APPENDECTOMY          INCISE FINGER TENDON SHEATH     Right thumb trigger finger release     RAD RESEC TUMOR,TALUS/CALCANEUS 06/19/03   Left foot spur surgery; Bora's deformity; Dr. Weeks at [facility]     BREAST BIOPSY: USING THE STEREOGUIDE STEREOTACTIC BIOPSY TABLE 09/10/03   Intramammary lymph node on biopsy - [facility]     DOPPLER ECHO HEART,COMPLETE 02   Noraml LV size & systolic function; Trace mitral insufficiency. Trace tricuspid insufficiency. Trace pulmonic insufficiency; [facility]     COLONOSCOPY,FLEX,SCREEN 03   Unremarkable with some questionable lsions in joan rectosigmoid area; father  of colon CA; [facility]     XR WRIST RIGHT 3 OR MORE VIEWS 03   Buckle type fracture of distal radius, essentially undisplaced; [facility]     XR LUMBAR SPINE 2 OR 3 VIEWS 02   Degenerative change and degenerative disc changes; scoliosis of lumbar spine convexity to rthe ight.; [facility]     UPPER GI ENDOSCOPY,EXAM 00   No evidence of GERD; Aging changes in the esophagus; otherwise normal study; [facility]     UP GI ENDOSCOPY,DILATN W GUIDE 2005        DILATION/CURETTAGE,DIAGNOSTIC     For menorrhagia     REMOVAL OF FALLOPIAN TUBE     Right - for tubal pregnancy     LAP,CHOLECYSTECTOMY 1994        REMV CATARACT EXTRACAP,INSERT LENS W/O ENDO CYCLOPHOTOCOAGUL 2009   [facility] - Left eye.     EYE PROCEDURE 05/15/2009   [facility] - Cataract, right eye. Removal of right upper lid sebaceous cyst and papillomas of right upper lid.         ROS is negative except as noted above       Allergies   Allergen Reactions     Sulfa Drugs Unknown   , History reviewed. No pertinent family history.,   Current Outpatient Medications   Medication     acetaminophen (TYLENOL) 650 MG CR tablet     aspirin (ASA) 81 MG EC tablet     atorvastatin (LIPITOR) 20 MG tablet     lisinopril (ZESTRIL) 20 MG tablet     Lutein 6 MG TABS     vitamin D3 (CHOLECALCIFEROL) 50 mcg (2000 units) tablet     No current facility-administered medications for this visit.    , History reviewed. No pertinent past medical history.,   Patient Active Problem List    Diagnosis Date Noted     Syncope 2020     Priority: Medium     UTI (urinary tract infection) 2020     Priority: Medium     Stenosis of left vertebral artery 2020     Priority: Medium      Hypercalcemia 02/22/2019     Priority: Medium     Vitamin D deficiency 02/22/2010     Priority: Medium     Dyspepsia and disorder of function of stomach 11/10/2003     Priority: Medium     Acid peptic disease  IMO Update 10/11       Breast neoplasm 09/05/2003     Priority: Medium     Ill-defined nodular density in upper outer aspect of right breast on U/S and mammogram today; steriotactic BX scheduled in [place]  IMO Update 10/11       Convulsions (H) 11/18/2002     Priority: Medium     Seizure disorder, Petit small seizures, curently stable  IMO Update 10/11       Essential hypertension 11/18/2002     Priority: Medium     IMO Update       Bundle branch block 07/22/2002     Priority: Medium     Noted on EKG 06/17/03  IMO Update 10/11     ,   Past Surgical History:   Procedure Laterality Date     APPENDECTOMY       AS ESOPHAGOSCOPY, DIAGNOSTIC  03/06/2000     bilateral cataract  2009     BREAST BIOPSY, CORE RT/LT  09/10/2003    [place]     COLONOSCOPY  07/30/2003    normal     ECTOPIC PREGNANCY SURGERY       EGD with dilation  09/14/2005     LAPAROSCOPIC CHOLECYSTECTOMY  01/1994     Left foot spur surgery, 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eks at [facility]     RELEASE TRIGGER FINGER      right thumb    and   Social History     Tobacco Use     Smoking status: Never Smoker     Smokeless tobacco: Never Used   Substance Use Topics     Alcohol use: Not Currently     OBJECTIVE:  BP (!) 154/82 (BP Location: Left arm, Patient Position: Sitting, Cuff Size: Adult Regular)   Pulse 80   Temp 98.5  F (36.9  C) (Tympanic)   Resp 16   Wt 70.9 kg (156 lb 6.4 oz)   LMP  (LMP Unknown)   SpO2 97%   Breastfeeding No    EXAM:  General Appearance: Pleasant, alert, appropriate appearance for age. No acute distress  Head Exam: Normal. Normocephalic, atraumatic.  Eye Exam: PERRLA, EOMI, conjunctivae, sclerae normal.  Ear Exam: Normal TM's bilaterally. Normal auditory canals and external ears. Non-tender.  Nose Exam: Normal external  nose, mucus membranes, and septum.  OroPharynx Exam:  Dental hygiene adequate. Normal buccal mucosa. Normal pharynx.  Neck Exam:  Supple, no masses or nodes. No bruits  Thyroid Exam: No nodules or enlargement.  Chest/Respiratory Exam: Normal chest wall and respirations. Clear to auscultation.  Cardiovascular Exam: Regular rate and rhythm. S1, S2, no murmur, click, gallop, or rubs.  Gastrointestinal Exam: Soft, non-tender, no masses or organomegaly.  Lymphatic Exam: Non-palpable nodes in neck,clavicular regions.  Musculoskeletal Exam: Back is straight and non-tender, full ROM of upper and lower extremities.  Foot Exam: Left and right foot: good pedal pulses  Skin: no rash or abnormalities  Neurologic Exam:  normal gross motor, tone coordination and no tremor.  Psychiatric Exam: Alert and oriented - appropriate affect.   ASSESSMENT/Plan :    Bernarda was seen today for recheck medication and establish care.    Diagnoses and all orders for this visit:    Essential hypertension  -     lisinopril (ZESTRIL) 20 MG tablet; Take 1 tablet (20 mg) by mouth daily    Acute cystitis without hematuria    Bundle branch block      Continue current medications. Discussed diet, exercise and healthy lifestyle changes. Did not feel we needed to repeat labs today.    A total of 25 minutes was spent with the patient, greater than 50% of the time was spent in counseling/discussion of the aforementioned concerns.     Abiel Brambila MD

## 2020-08-25 NOTE — NURSING NOTE
Patient is here for medication refill, discuss establishing care. Patient states had been seeing a provider at [facility]. Patient also states she recently was in the hospital for bladder infection.    No LMP recorded (lmp unknown). Patient is postmenopausal.  Medication Reconciliation: complete    Shayy Son LPN  8/25/2020 2:45 PM

## 2020-12-28 ENCOUNTER — PATIENT OUTREACH (OUTPATIENT)
Dept: CARE COORDINATION | Facility: CLINIC | Age: 85
End: 2020-12-28

## 2020-12-28 NOTE — PROGRESS NOTES
Clinic Care Coordination Contact  Care Team Conversations    Patient is on the phone with her room mate, Jakob, asking if she may make a plan to have the Covid vaccination when available.  Let her know we could get advice from PCP and let her know when it might be scheduled if appropriate.   Mahi Minor RN on 12/28/2020 at 1:38 PM

## 2021-01-03 NOTE — TELEPHONE ENCOUNTER
Yes. She would be a candidate for the Covid vaccine and I would recommend it when available.  Abiel Brambila MD on 1/3/2021 at 12:11 PM

## 2021-02-02 ENCOUNTER — IMMUNIZATION (OUTPATIENT)
Dept: FAMILY MEDICINE | Facility: OTHER | Age: 86
End: 2021-02-02
Payer: MEDICARE

## 2021-02-02 PROCEDURE — 91300 PR COVID VAC PFIZER DIL RECON 30 MCG/0.3 ML IM: CPT

## 2021-02-23 ENCOUNTER — IMMUNIZATION (OUTPATIENT)
Dept: FAMILY MEDICINE | Facility: OTHER | Age: 86
End: 2021-02-23
Attending: FAMILY MEDICINE
Payer: MEDICARE

## 2021-02-23 PROCEDURE — 91300 PR COVID VAC PFIZER DIL RECON 30 MCG/0.3 ML IM: CPT

## 2021-07-29 DIAGNOSIS — I65.02 STENOSIS OF LEFT VERTEBRAL ARTERY: ICD-10-CM

## 2021-07-29 DIAGNOSIS — I45.4 BUNDLE BRANCH BLOCK: Primary | ICD-10-CM

## 2021-07-29 RX ORDER — ATORVASTATIN CALCIUM 20 MG/1
TABLET, FILM COATED ORAL
Qty: 90 TABLET | Refills: 0 | Status: SHIPPED | OUTPATIENT
Start: 2021-07-29 | End: 2021-08-19

## 2021-07-29 NOTE — LETTER
July 29, 2021      Bernarda Ramírez  [address] [place] [address]        Dear Bernarda,         A refill of atorvastatin (LIPITOR) 20 MG tablet hasbeen requested by your pharmacy and we noticed that you are due for an annual exam.  Your last comprehensive visit with Abiel Brambila MD was on 08/25/2020.    This refill request has been sent to your provider for consideration at this time.    Your health is very important to us.  Please call the clinic at [phone] to schedule your appointment.    Thank you for choosing [facility] and [facility] for your health care needs.    Sincerely,    Refill RN  [facility]

## 2021-07-29 NOTE — TELEPHONE ENCOUNTER
"[facility] sent Rx request for the following:   atorvastatin (LIPITOR) 20 MG tablet  Sig Take 1 tablet by mouth once daily    Last Prescription Date:   07/30/2020  Last Fill Qty/Refills:         90, R-3    Last Office Visit:              08/25/2020 ([place])   Future Office visit:           None noted   Statins Protocol Failed - 7/29/2021  9:22 AM        Failed - Recent (12 mo) or future (30 days) visit within the authorizing provider's specialty     Patient has had an office visit with the authorizing provider or a provider within the authorizing providers department within the previous 12 mos or has a future within next 30 days. See \"Patient Info\" tab in inbasket, or \"Choose Columns\" in Meds & Orders section of the refill encounter.           Rx previously prescribed via Cullen Hansen MD. No PCP on file. Routing for provider review and consideration of pérez refill. Letter regarding appointment need sent. Unable to complete prescription refill per RN Medication Refill Policy.................... Monique Cheek RN ....................  7/29/2021   9:28 AM        "

## 2021-08-19 ENCOUNTER — OFFICE VISIT (OUTPATIENT)
Dept: FAMILY MEDICINE | Facility: OTHER | Age: 86
End: 2021-08-19
Attending: FAMILY MEDICINE
Payer: COMMERCIAL

## 2021-08-19 VITALS
HEART RATE: 83 BPM | DIASTOLIC BLOOD PRESSURE: 70 MMHG | OXYGEN SATURATION: 97 % | SYSTOLIC BLOOD PRESSURE: 136 MMHG | HEIGHT: 60 IN | WEIGHT: 156 LBS | BODY MASS INDEX: 30.63 KG/M2 | RESPIRATION RATE: 18 BRPM | TEMPERATURE: 98 F

## 2021-08-19 DIAGNOSIS — I45.4 BUNDLE BRANCH BLOCK: ICD-10-CM

## 2021-08-19 DIAGNOSIS — I65.02 STENOSIS OF LEFT VERTEBRAL ARTERY: ICD-10-CM

## 2021-08-19 DIAGNOSIS — I10 ESSENTIAL HYPERTENSION: ICD-10-CM

## 2021-08-19 DIAGNOSIS — G56.03 BILATERAL CARPAL TUNNEL SYNDROME: ICD-10-CM

## 2021-08-19 DIAGNOSIS — Z00.00 ENCOUNTER FOR MEDICARE ANNUAL WELLNESS EXAM: Primary | ICD-10-CM

## 2021-08-19 LAB
ALBUMIN SERPL-MCNC: 4 G/DL (ref 3.5–5.7)
ALP SERPL-CCNC: 52 U/L (ref 34–104)
ALT SERPL W P-5'-P-CCNC: 11 U/L (ref 7–52)
ANION GAP SERPL CALCULATED.3IONS-SCNC: 5 MMOL/L (ref 3–14)
AST SERPL W P-5'-P-CCNC: 13 U/L (ref 13–39)
BASOPHILS # BLD AUTO: 0 10E3/UL (ref 0–0.2)
BASOPHILS NFR BLD AUTO: 1 %
BILIRUB SERPL-MCNC: 0.4 MG/DL (ref 0.3–1)
BUN SERPL-MCNC: 25 MG/DL (ref 7–25)
CALCIUM SERPL-MCNC: 10.5 MG/DL (ref 8.6–10.3)
CHLORIDE BLD-SCNC: 103 MMOL/L (ref 98–107)
CHOLEST SERPL-MCNC: 211 MG/DL
CO2 SERPL-SCNC: 32 MMOL/L (ref 21–31)
CREAT SERPL-MCNC: 1.3 MG/DL (ref 0.6–1.2)
EOSINOPHIL # BLD AUTO: 0.1 10E3/UL (ref 0–0.7)
EOSINOPHIL NFR BLD AUTO: 2 %
ERYTHROCYTE [DISTWIDTH] IN BLOOD BY AUTOMATED COUNT: 12.6 % (ref 10–15)
FASTING STATUS PATIENT QL REPORTED: ABNORMAL
GFR SERPL CREATININE-BSD FRML MDRD: 37 ML/MIN/1.73M2
GLUCOSE BLD-MCNC: 173 MG/DL (ref 70–105)
HCT VFR BLD AUTO: 40.4 % (ref 35–47)
HDLC SERPL-MCNC: 56 MG/DL (ref 23–92)
HGB BLD-MCNC: 13.1 G/DL (ref 11.7–15.7)
IMM GRANULOCYTES # BLD: 0 10E3/UL
IMM GRANULOCYTES NFR BLD: 0 %
LDLC SERPL CALC-MCNC: 133 MG/DL
LYMPHOCYTES # BLD AUTO: 1.7 10E3/UL (ref 0.8–5.3)
LYMPHOCYTES NFR BLD AUTO: 34 %
MCH RBC QN AUTO: 29.3 PG (ref 26.5–33)
MCHC RBC AUTO-ENTMCNC: 32.4 G/DL (ref 31.5–36.5)
MCV RBC AUTO: 90 FL (ref 78–100)
MONOCYTES # BLD AUTO: 0.4 10E3/UL (ref 0–1.3)
MONOCYTES NFR BLD AUTO: 7 %
NEUTROPHILS # BLD AUTO: 2.8 10E3/UL (ref 1.6–8.3)
NEUTROPHILS NFR BLD AUTO: 56 %
NONHDLC SERPL-MCNC: 155 MG/DL
NRBC # BLD AUTO: 0 10E3/UL
NRBC BLD AUTO-RTO: 0 /100
PLATELET # BLD AUTO: 210 10E3/UL (ref 150–450)
POTASSIUM BLD-SCNC: 4.9 MMOL/L (ref 3.5–5.1)
PROT SERPL-MCNC: 6.2 G/DL (ref 6.4–8.9)
RBC # BLD AUTO: 4.47 10E6/UL (ref 3.8–5.2)
SODIUM SERPL-SCNC: 140 MMOL/L (ref 134–144)
TRIGL SERPL-MCNC: 111 MG/DL
WBC # BLD AUTO: 5.1 10E3/UL (ref 4–11)

## 2021-08-19 PROCEDURE — G0463 HOSPITAL OUTPT CLINIC VISIT: HCPCS

## 2021-08-19 PROCEDURE — 80061 LIPID PANEL: CPT | Mod: ZL | Performed by: FAMILY MEDICINE

## 2021-08-19 PROCEDURE — 80053 COMPREHEN METABOLIC PANEL: CPT | Mod: ZL | Performed by: FAMILY MEDICINE

## 2021-08-19 PROCEDURE — 85025 COMPLETE CBC W/AUTO DIFF WBC: CPT | Mod: ZL | Performed by: FAMILY MEDICINE

## 2021-08-19 PROCEDURE — 36415 COLL VENOUS BLD VENIPUNCTURE: CPT | Mod: ZL | Performed by: FAMILY MEDICINE

## 2021-08-19 PROCEDURE — G0439 PPPS, SUBSEQ VISIT: HCPCS | Performed by: FAMILY MEDICINE

## 2021-08-19 PROCEDURE — 99214 OFFICE O/P EST MOD 30 MIN: CPT | Mod: 25 | Performed by: FAMILY MEDICINE

## 2021-08-19 RX ORDER — HYDROCHLOROTHIAZIDE 12.5 MG/1
12.5 TABLET ORAL DAILY
COMMUNITY
End: 2021-08-19

## 2021-08-19 RX ORDER — HYDROCHLOROTHIAZIDE 12.5 MG/1
12.5 TABLET ORAL DAILY
Qty: 90 TABLET | Refills: 11 | Status: SHIPPED | OUTPATIENT
Start: 2021-08-19 | End: 2022-05-04

## 2021-08-19 RX ORDER — ATORVASTATIN CALCIUM 20 MG/1
20 TABLET, FILM COATED ORAL DAILY
Qty: 90 TABLET | Refills: 3 | Status: SHIPPED | OUTPATIENT
Start: 2021-08-19 | End: 2022-05-04

## 2021-08-19 RX ORDER — LISINOPRIL 20 MG/1
20 TABLET ORAL DAILY
Qty: 90 TABLET | Refills: 4 | Status: SHIPPED | OUTPATIENT
Start: 2021-08-19 | End: 2022-05-04

## 2021-08-19 ASSESSMENT — PAIN SCALES - GENERAL: PAINLEVEL: NO PAIN (0)

## 2021-08-19 ASSESSMENT — MIFFLIN-ST. JEOR: SCORE: 1073.08

## 2021-08-19 NOTE — NURSING NOTE
"No chief complaint on file.      Initial /70   Pulse 83   Temp 98  F (36.7  C) (Temporal)   Resp 18   Ht 1.53 m (5' 0.25\")   Wt 70.8 kg (156 lb)   LMP  (LMP Unknown)   SpO2 97%   Breastfeeding No   BMI 30.21 kg/m   Estimated body mass index is 30.21 kg/m  as calculated from the following:    Height as of this encounter: 1.53 m (5' 0.25\").    Weight as of this encounter: 70.8 kg (156 lb).  Medication Reconciliation: complete    FOOD SECURITY SCREENING QUESTIONS  Hunger Vital Signs:  Within the past 12 months we worried whether our food would run out before we got money to buy more. Never  Within the past 12 months the food we bought just didn't last and we didn't have money to get more. Never    Aracelis Fish, EDIE    "

## 2021-08-19 NOTE — PATIENT INSTRUCTIONS
They will call you about the EMG test for your hands.    Ask the pharmacist about getting a tetanus shot at the pharmacy (TDAP).  Also ask them about the Shingles shot.      Patient Education   Personalized Prevention Plan  You are due for the preventive services outlined below.  Your care team is available to assist you in scheduling these services.  If you have already completed any of these items, please share that information with your care team to update in your medical record.  Health Maintenance Due   Topic Date Due     Discuss Advance Care Planning  Never done     Zoster (Shingles) Vaccine (1 of 2) Never done     Diptheria Tetanus Pertussis (DTAP/TDAP/TD) Vaccine (2 - Td or Tdap) 08/27/2017     FALL RISK ASSESSMENT  08/05/2021

## 2021-08-19 NOTE — PROGRESS NOTES
"  SUBJECTIVE:   Bernarda Ramírez is a 86 year old female who presents for Preventive Visit.      Patient has been advised of split billing requirements and indicates understanding: Yes  Are you in the first 12 months of your Medicare Part B coverage?  No    Physical Health:    In general, how would you rate your overall physical health? fair    Outside of work, how many days during the week do you exercise? none    Outside of work, approximately how many minutes a day do you exercise?not applicable    If you drink alcohol do you typically have >3 drinks per day or >7 drinks per week? No    Do you usually eat at least 4 servings of fruit and vegetables a day, include whole grains & fiber and avoid regularly eating high fat or \"junk\" foods? NO    Do you have any problems taking medications regularly?  No    Do you have any side effects from medications? none    Needs assistance for the following daily activities: no assistance needed    Which of the following safety concerns are present in your home?  none identified     Hearing impairment: Yes, has hearing aides    In the past 6 months, have you been bothered by leaking of urine? yes    Mental Health:    In general, how would you rate your overall mental or emotional health? fair  PHQ-2 Score: 0    Do you feel safe in your environment? Yes    Have you ever done Advance Care Planning? (For example, a Health Directive, POLST, or a discussion with a medical provider or your loved ones about your wishes): Yes, advance care planning is on file.    Additional concerns to address?  No    Fall risk:  Fallen 2 or more times in the past year?: No  Any fall with injury in the past year?: No    Cognitive Screenin) Repeat 3 items (Leader, Season, Table)    2) Clock draw: ABNORMAL time was wrong  3) 3 item recall: Recalls 3 objects  Results: 3 items recalled: COGNITIVE IMPAIRMENT LESS LIKELY    Mini-CogTM Copyright LADY Rouse. Licensed by the author for use in [facility] " "Services; reprinted with permission (soob@[facility]). All rights reserved.      Do you have sleep apnea, excessive snoring or daytime drowsiness?: no      Reviewed and updated as needed this visit by clinical staff  Tobacco  Allergies      Fam Hx  Soc Hx        Reviewed and updated as needed this visit by Provider                Social History     Tobacco Use     Smoking status: Never Smoker     Smokeless tobacco: Never Used   Substance Use Topics     Alcohol use: Not Currently                           Current providers sharing in care for this patient include:   Patient Care Team:  Abiel Brambila MD as PCP - General (Family Medicine)  Abiel Brambila MD as Assigned PCP  Cheng Thibodeaux MD as Assigned Surgical Provider    The following health maintenance items are reviewed in Epic and correct as of today:  Health Maintenance   Topic Date Due     ADVANCE CARE PLANNING  Never done     ZOSTER IMMUNIZATION (1 of 2) Never done     MEDICARE ANNUAL WELLNESS VISIT  Never done     DTAP/TDAP/TD IMMUNIZATION (2 - Td or Tdap) 08/27/2017     FALL RISK ASSESSMENT  08/05/2021     INFLUENZA VACCINE (1) 09/01/2021     PHQ-2  Completed     Pneumococcal Vaccine: 65+ Years  Completed     COVID-19 Vaccine  Completed     IPV IMMUNIZATION  Aged Out     MENINGITIS IMMUNIZATION  Aged Out     HEPATITIS B IMMUNIZATION  Aged Out     Labs reviewed in EPIC      ROS:  See below    OBJECTIVE:   /70   Pulse 83   Temp 98  F (36.7  C) (Temporal)   Resp 18   Ht 1.53 m (5' 0.25\")   Wt 70.8 kg (156 lb)   LMP  (LMP Unknown)   SpO2 97%   Breastfeeding No   BMI 30.21 kg/m   Estimated body mass index is 30.21 kg/m  as calculated from the following:    Height as of this encounter: 1.53 m (5' 0.25\").    Weight as of this encounter: 70.8 kg (156 lb).  EXAM:   See below    Diagnostic Test Results:  Labs reviewed in Epic    ASSESSMENT / PLAN:   Bernarda was seen today for medicare visit.    Diagnoses and all orders for this visit:    Encounter " "for Medicare annual wellness exam    Essential hypertension  -     CBC with Platelets & Differential; Future  -     Comprehensive metabolic panel; Future  -     Lipid Profile; Future  -     lisinopril (ZESTRIL) 20 MG tablet; Take 1 tablet (20 mg) by mouth daily  -     hydrochlorothiazide (HYDRODIURIL) 12.5 MG tablet; Take 1 tablet (12.5 mg) by mouth daily  -     Lipid Profile  -     Comprehensive metabolic panel  -     CBC with Platelets & Differential    Stenosis of left vertebral artery  -     CBC with Platelets & Differential; Future  -     Comprehensive metabolic panel; Future  -     Lipid Profile; Future  -     atorvastatin (LIPITOR) 20 MG tablet; Take 1 tablet (20 mg) by mouth daily  -     Lipid Profile  -     Comprehensive metabolic panel  -     CBC with Platelets & Differential    Bundle branch block  -     atorvastatin (LIPITOR) 20 MG tablet; Take 1 tablet (20 mg) by mouth daily    Bilateral carpal tunnel syndrome  -     Adult Neurology Referral; Future        Patient has been advised of split billing requirements and indicates understanding: Yes    COUNSELING:  Reviewed preventive health counseling, as reflected in patient instructions       Regular exercise       Healthy diet/nutrition    Estimated body mass index is 30.21 kg/m  as calculated from the following:    Height as of this encounter: 1.53 m (5' 0.25\").    Weight as of this encounter: 70.8 kg (156 lb).        She reports that she has never smoked. She has never used smokeless tobacco.    Appropriate preventive services were discussed with this patient, including applicable screening as appropriate for cardiovascular disease, diabetes, osteopenia/osteoporosis, and glaucoma.  As appropriate for age/gender, discussed screening for colorectal cancer, prostate cancer, breast cancer, and cervical cancer. Checklist reviewing preventive services available has been given to the patient.    Reviewed patients plan of care and provided an AVS. The Basic Care " Plan (routine screening as documented in Health Maintenance) for Bernarda meets the Care Plan requirement. This Care Plan has been established and reviewed with the Patient.    Counseling Resources:  ATP IV Guidelines  Pooled Cohorts Equation Calculator  Breast Cancer Risk Calculator  BRCA-Related Cancer Risk Assessment: FHS-7 Tool  FRAX Risk Assessment  ICSI Preventive Guidelines  Dietary Guidelines for Americans, 2010  USDA's MyPlate  ASA Prophylaxis  Lung CA Screening    Abiel Brambila MD  [facility] AND [facility]

## 2021-08-19 NOTE — PROGRESS NOTES
"Nursing Notes:   Aracelis Fish LPN  8/19/2021  3:11 PM  Signed  No chief complaint on file.      Initial /70   Pulse 83   Temp 98  F (36.7  C) (Temporal)   Resp 18   Ht 1.53 m (5' 0.25\")   Wt 70.8 kg (156 lb)   LMP  (LMP Unknown)   SpO2 97%   Breastfeeding No   BMI 30.21 kg/m   Estimated body mass index is 30.21 kg/m  as calculated from the following:    Height as of this encounter: 1.53 m (5' 0.25\").    Weight as of this encounter: 70.8 kg (156 lb).  Medication Reconciliation: complete    FOOD SECURITY SCREENING QUESTIONS  Hunger Vital Signs:  Within the past 12 months we worried whether our food would run out before we got money to buy more. Never  Within the past 12 months the food we bought just didn't last and we didn't have money to get more. Never    Aracelis Fish LPN        SUBJECTIVE:  Bernarda Ramírez  is a 86 year old female who comes in for Medicare wellness and complete evaluation.  She saw me a year ago to establish care. She came here after being hospitalized for had a urinary tract infection.  She had a syncopal episode and MRI of the brain and CTA were negative.  She had an incidentally noted stenosis of her left vertebral artery so low-dose aspirin and Lipitor were added.  She continued on lisinopril and hydrochlorothiazide for hypertension.  She had some brief urinary retention but passed a voiding trial and has no longer needed a catheter.    She is up-to-date on immunizations including COVID-19.  It does appear that her last tetanus booster was in 2007 so would be due for that.  She has gotten her pneumonia shot after age 65.  She has not had Shingrix.    Her right foot and ankle was bothering.  It is better with She has bilateral hand numbness that is better with capsaicin.     Past Medical, Family, and Social History reviewed and updated as noted below.   ROS is negative except as noted above       Allergies   Allergen Reactions     Sulfa Drugs Unknown   , History " reviewed. No pertinent family history.,   Current Outpatient Medications   Medication     acetaminophen (TYLENOL) 650 MG CR tablet     aspirin (ASA) 81 MG EC tablet     atorvastatin (LIPITOR) 20 MG tablet     hydrochlorothiazide (HYDRODIURIL) 12.5 MG tablet     lisinopril (ZESTRIL) 20 MG tablet     Lutein 6 MG TABS     vitamin D3 (CHOLECALCIFEROL) 50 mcg (2000 units) tablet     No current facility-administered medications for this visit.   , No past medical history on file.,   Patient Active Problem List    Diagnosis Date Noted     Syncope 07/28/2020     Priority: Medium     UTI (urinary tract infection) 07/28/2020     Priority: Medium     Stenosis of left vertebral artery 07/28/2020     Priority: Medium     Hypercalcemia 02/22/2019     Priority: Medium     Vitamin D deficiency 02/22/2010     Priority: Medium     Dyspepsia and disorder of function of stomach 11/10/2003     Priority: Medium     Acid peptic disease  IMO Update 10/11       Breast neoplasm 09/05/2003     Priority: Medium     Ill-defined nodular density in upper outer aspect of right breast on U/S and mammogram today; steriotactic BX scheduled in [place]  IMO Update 10/11       Convulsions (H) 11/18/2002     Priority: Medium     Seizure disorder, Petit small seizures, curently stable  IMO Update 10/11       Essential hypertension 11/18/2002     Priority: Medium     IMO Update       Bundle branch block 07/22/2002     Priority: Medium     Noted on EKG 06/17/03  IMO Update 10/11     ,   Past Surgical History:   Procedure Laterality Date     APPENDECTOMY       AS ESOPHAGOSCOPY, DIAGNOSTIC  03/06/2000     bilateral cataract  2009     BREAST BIOPSY, CORE RT/LT  09/10/2003    Range     COLONOSCOPY  07/30/2003    normal     ECTOPIC PREGNANCY SURGERY       EGD with dilation  09/14/2005     LAPAROSCOPIC CHOLECYSTECTOMY  01/1994     Left foot spur surgery, 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eks at [facility]     RELEASE TRIGGER FINGER      right thumb    and  "  Social History     Tobacco Use     Smoking status: Never Smoker     Smokeless tobacco: Never Used   Substance Use Topics     Alcohol use: Not Currently     OBJECTIVE:  /70   Pulse 83   Temp 98  F (36.7  C) (Temporal)   Resp 18   Ht 1.53 m (5' 0.25\")   Wt 70.8 kg (156 lb)   LMP  (LMP Unknown)   SpO2 97%   Breastfeeding No   BMI 30.21 kg/m     EXAM:  General Appearance: Pleasant, alert, appropriate appearance for age. No acute distress  Head Exam: Normal. Normocephalic, atraumatic.  Eye Exam: PERRLA, EOMI, conjunctivae, sclerae normal.  Ear Exam: Normal TM's bilaterally. Normal auditory canals and external ears. Non-tender.  Nose Exam: Normal external nose, mucus membranes, and septum.  OroPharynx Exam:  Dental hygiene adequate. Normal buccal mucosa. Normal pharynx.  Neck Exam:  Supple, no masses or nodes. No bruits  Thyroid Exam: No nodules or enlargement.  Chest/Respiratory Exam: Normal chest wall and respirations. Clear to auscultation.  Cardiovascular Exam: Regular rate and rhythm. S1, S2, no murmur, click, gallop, or rubs.  Gastrointestinal Exam: Soft, non-tender, no masses or organomegaly.  Lymphatic Exam: Non-palpable nodes in neck,clavicular, axillary, or inguinal regions.  Musculoskeletal Exam: Back is straight and non-tender, full ROM of upper and lower extremities.  Phalen's and Tinel signs are equivocal bilaterally.  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  Foot Exam: Left and right foot: good pedal pulses  Skin: no rash or abnormalities  Neurologic Exam:  normal gross motor, tone coordination and no tremor.  Psychiatric Exam: Alert and oriented - appropriate affect.   Results for orders placed or performed in visit on 08/19/21   Lipid Profile     Status: Abnormal   Result Value Ref Range    Cholesterol 211 (H) <200 mg/dL    Triglycerides 111 <150 mg/dL    Direct Measure HDL 56 23 - 92 mg/dL    LDL Cholesterol Calculated 133 (H) <=100 mg/dL    Non HDL Cholesterol 155 (H) <130 mg/dL    Patient " Fasting > 8hrs? Unknown    Comprehensive metabolic panel     Status: Abnormal   Result Value Ref Range    Sodium 140 134 - 144 mmol/L    Potassium 4.9 3.5 - 5.1 mmol/L    Chloride 103 98 - 107 mmol/L    Carbon Dioxide (CO2) 32 (H) 21 - 31 mmol/L    Anion Gap 5 3 - 14 mmol/L    Urea Nitrogen 25 7 - 25 mg/dL    Creatinine 1.30 (H) 0.60 - 1.20 mg/dL    Calcium 10.5 (H) 8.6 - 10.3 mg/dL    Glucose 173 (H) 70 - 105 mg/dL    Alkaline Phosphatase 52 34 - 104 U/L    AST 13 13 - 39 U/L    ALT 11 7 - 52 U/L    Protein Total 6.2 (L) 6.4 - 8.9 g/dL    Albumin 4.0 3.5 - 5.7 g/dL    Bilirubin Total 0.4 0.3 - 1.0 mg/dL    GFR Estimate 37 (L) >60 mL/min/1.73m2   CBC with Platelets & Differential     Status: None    Narrative    The following orders were created for panel order CBC with Platelets & Differential.  Procedure                               Abnormality         Status                     ---------                               -----------         ------                     CBC with platelets and d...[[id number]]                      Final result                 Please view results for these tests on the individual orders.   CBC with platelets and differential     Status: None   Result Value Ref Range    WBC Count 5.1 4.0 - 11.0 10e3/uL    RBC Count 4.47 3.80 - 5.20 10e6/uL    Hemoglobin 13.1 11.7 - 15.7 g/dL    Hematocrit 40.4 35.0 - 47.0 %    MCV 90 78 - 100 fL    MCH 29.3 26.5 - 33.0 pg    MCHC 32.4 31.5 - 36.5 g/dL    RDW 12.6 10.0 - 15.0 %    Platelet Count 210 150 - 450 10e3/uL    % Neutrophils 56 %    % Lymphocytes 34 %    % Monocytes 7 %    % Eosinophils 2 %    % Basophils 1 %    % Immature Granulocytes 0 %    NRBCs per 100 WBC 0 <1 /100    Absolute Neutrophils 2.8 1.6 - 8.3 10e3/uL    Absolute Lymphocytes 1.7 0.8 - 5.3 10e3/uL    Absolute Monocytes 0.4 0.0 - 1.3 10e3/uL    Absolute Eosinophils 0.1 0.0 - 0.7 10e3/uL    Absolute Basophils 0.0 0.0 - 0.2 10e3/uL    Absolute Immature Granulocytes 0.0 <=0.0 10e3/uL     Absolute NRBCs 0.0 10e3/uL       ASSESSMENT/Plan :    Bernarda was seen today for medicare visit.    Diagnoses and all orders for this visit:    Encounter for Medicare annual wellness exam    Essential hypertension  -     CBC with Platelets & Differential; Future  -     Comprehensive metabolic panel; Future  -     Lipid Profile; Future  -     lisinopril (ZESTRIL) 20 MG tablet; Take 1 tablet (20 mg) by mouth daily  -     hydrochlorothiazide (HYDRODIURIL) 12.5 MG tablet; Take 1 tablet (12.5 mg) by mouth daily  -     Lipid Profile  -     Comprehensive metabolic panel  -     CBC with Platelets & Differential    Stenosis of left vertebral artery  -     CBC with Platelets & Differential; Future  -     Comprehensive metabolic panel; Future  -     Lipid Profile; Future  -     atorvastatin (LIPITOR) 20 MG tablet; Take 1 tablet (20 mg) by mouth daily  -     Lipid Profile  -     Comprehensive metabolic panel  -     CBC with Platelets & Differential    Bundle branch block  -     atorvastatin (LIPITOR) 20 MG tablet; Take 1 tablet (20 mg) by mouth daily    Bilateral carpal tunnel syndrome  -     Adult Neurology Referral; Future      Will notify of lab results when available. Discussed diet, exercise and healthy lifestyle changes. Continue current medications.     Referred for EMG and if it shows CTS would then seek surgical referral.    Discussed need for TDAP and Shingrix and she will inquire at the pharmacy.    A total of 39 minutes was spent with the patient, reviewing records, tests, ordering medications, tests or procedures and documenting clinical information in the EHR in addition to Medicare Wellness.       Abiel Brambila MD

## 2021-08-19 NOTE — LETTER
August 19, 2021      Bernarda Ramírez  [address] [place] [address]        Dear Bernarda,     Your labs look fine. Your complete blood count was normal. Your comprehensive metabolic panel (a test that looks at liver and kidney function, blood sugar, electrolytes, and nutritional status) was normal.  Your cholesterol selvin a little, but not surprising since you had been out of your medication for a month.    It was a pleasure seeing you the other day.  If you have any questions, please don't hesitate to call us.       Sincerely,        Abiel Brambila MD                                      Results for orders placed or performed in visit on 08/19/21   Lipid Profile     Status: Abnormal   Result Value Ref Range    Cholesterol 211 (H) <200 mg/dL    Triglycerides 111 <150 mg/dL    Direct Measure HDL 56 23 - 92 mg/dL    LDL Cholesterol Calculated 133 (H) <=100 mg/dL    Non HDL Cholesterol 155 (H) <130 mg/dL    Patient Fasting > 8hrs? Unknown    Comprehensive metabolic panel     Status: Abnormal   Result Value Ref Range    Sodium 140 134 - 144 mmol/L    Potassium 4.9 3.5 - 5.1 mmol/L    Chloride 103 98 - 107 mmol/L    Carbon Dioxide (CO2) 32 (H) 21 - 31 mmol/L    Anion Gap 5 3 - 14 mmol/L    Urea Nitrogen 25 7 - 25 mg/dL    Creatinine 1.30 (H) 0.60 - 1.20 mg/dL    Calcium 10.5 (H) 8.6 - 10.3 mg/dL    Glucose 173 (H) 70 - 105 mg/dL    Alkaline Phosphatase 52 34 - 104 U/L    AST 13 13 - 39 U/L    ALT 11 7 - 52 U/L    Protein Total 6.2 (L) 6.4 - 8.9 g/dL    Albumin 4.0 3.5 - 5.7 g/dL    Bilirubin Total 0.4 0.3 - 1.0 mg/dL    GFR Estimate 37 (L) >60 mL/min/1.73m2   CBC with Platelets & Differential     Status: None    Narrative    The following orders were created for panel order CBC with Platelets & Differential.  Procedure                               Abnormality         Status                     ---------                               -----------         ------                     CBC with platelets and d...[[id number]]                       Final result                 Please view results for these tests on the individual orders.   CBC with platelets and differential     Status: None   Result Value Ref Range    WBC Count 5.1 4.0 - 11.0 10e3/uL    RBC Count 4.47 3.80 - 5.20 10e6/uL    Hemoglobin 13.1 11.7 - 15.7 g/dL    Hematocrit 40.4 35.0 - 47.0 %    MCV 90 78 - 100 fL    MCH 29.3 26.5 - 33.0 pg    MCHC 32.4 31.5 - 36.5 g/dL    RDW 12.6 10.0 - 15.0 %    Platelet Count 210 150 - 450 10e3/uL    % Neutrophils 56 %    % Lymphocytes 34 %    % Monocytes 7 %    % Eosinophils 2 %    % Basophils 1 %    % Immature Granulocytes 0 %    NRBCs per 100 WBC 0 <1 /100    Absolute Neutrophils 2.8 1.6 - 8.3 10e3/uL    Absolute Lymphocytes 1.7 0.8 - 5.3 10e3/uL    Absolute Monocytes 0.4 0.0 - 1.3 10e3/uL    Absolute Eosinophils 0.1 0.0 - 0.7 10e3/uL    Absolute Basophils 0.0 0.0 - 0.2 10e3/uL    Absolute Immature Granulocytes 0.0 <=0.0 10e3/uL    Absolute NRBCs 0.0 10e3/uL

## 2021-08-20 ENCOUNTER — HOSPITAL ENCOUNTER (EMERGENCY)
Facility: OTHER | Age: 86
Discharge: HOME | End: 2021-08-20
Attending: EMERGENCY MEDICINE | Admitting: EMERGENCY MEDICINE
Payer: MEDICARE

## 2021-08-20 VITALS
WEIGHT: 158 LBS | RESPIRATION RATE: 16 BRPM | DIASTOLIC BLOOD PRESSURE: 62 MMHG | TEMPERATURE: 98 F | HEART RATE: 82 BPM | BODY MASS INDEX: 31.02 KG/M2 | HEIGHT: 60 IN | SYSTOLIC BLOOD PRESSURE: 148 MMHG | OXYGEN SATURATION: 98 %

## 2021-08-20 DIAGNOSIS — N39.0 URINARY TRACT INFECTION WITHOUT HEMATURIA, SITE UNSPECIFIED: ICD-10-CM

## 2021-08-20 LAB
ALBUMIN UR-MCNC: 50 MG/DL
ANION GAP SERPL CALCULATED.3IONS-SCNC: 8 MMOL/L (ref 3–14)
APPEARANCE UR: ABNORMAL
BACTERIA #/AREA URNS HPF: ABNORMAL /HPF
BASOPHILS # BLD AUTO: 0 10E3/UL (ref 0–0.2)
BASOPHILS NFR BLD AUTO: 1 %
BILIRUB UR QL STRIP: NEGATIVE
BUN SERPL-MCNC: 27 MG/DL (ref 7–25)
CALCIUM SERPL-MCNC: 10.3 MG/DL (ref 8.6–10.3)
CHLORIDE BLD-SCNC: 103 MMOL/L (ref 98–107)
CO2 SERPL-SCNC: 26 MMOL/L (ref 21–31)
COLOR UR AUTO: YELLOW
CREAT SERPL-MCNC: 1.36 MG/DL (ref 0.6–1.2)
EOSINOPHIL # BLD AUTO: 0.1 10E3/UL (ref 0–0.7)
EOSINOPHIL NFR BLD AUTO: 2 %
ERYTHROCYTE [DISTWIDTH] IN BLOOD BY AUTOMATED COUNT: 12.8 % (ref 10–15)
GFR SERPL CREATININE-BSD FRML MDRD: 35 ML/MIN/1.73M2
GLUCOSE BLD-MCNC: 131 MG/DL (ref 70–105)
GLUCOSE UR STRIP-MCNC: NEGATIVE MG/DL
HCT VFR BLD AUTO: 40.8 % (ref 35–47)
HGB BLD-MCNC: 13.4 G/DL (ref 11.7–15.7)
HGB UR QL STRIP: NEGATIVE
HOLD SPECIMEN: NORMAL
IMM GRANULOCYTES # BLD: 0 10E3/UL
IMM GRANULOCYTES NFR BLD: 0 %
KETONES UR STRIP-MCNC: NEGATIVE MG/DL
LEUKOCYTE ESTERASE UR QL STRIP: ABNORMAL
LYMPHOCYTES # BLD AUTO: 0.9 10E3/UL (ref 0.8–5.3)
LYMPHOCYTES NFR BLD AUTO: 19 %
MCH RBC QN AUTO: 29.5 PG (ref 26.5–33)
MCHC RBC AUTO-ENTMCNC: 32.8 G/DL (ref 31.5–36.5)
MCV RBC AUTO: 90 FL (ref 78–100)
MONOCYTES # BLD AUTO: 0.4 10E3/UL (ref 0–1.3)
MONOCYTES NFR BLD AUTO: 8 %
MUCOUS THREADS #/AREA URNS LPF: PRESENT /LPF
NEUTROPHILS # BLD AUTO: 3.4 10E3/UL (ref 1.6–8.3)
NEUTROPHILS NFR BLD AUTO: 70 %
NITRATE UR QL: POSITIVE
NRBC # BLD AUTO: 0 10E3/UL
NRBC BLD AUTO-RTO: 0 /100
PH UR STRIP: 8 [PH] (ref 5–9)
PLATELET # BLD AUTO: 191 10E3/UL (ref 150–450)
POTASSIUM BLD-SCNC: 4.4 MMOL/L (ref 3.5–5.1)
RBC # BLD AUTO: 4.55 10E6/UL (ref 3.8–5.2)
RBC URINE: 9 /HPF
SODIUM SERPL-SCNC: 137 MMOL/L (ref 134–144)
SP GR UR STRIP: 1.02 (ref 1–1.03)
SQUAMOUS EPITHELIAL: 4 /HPF
TRI-PHOS CRY #/AREA URNS HPF: ABNORMAL /HPF
UROBILINOGEN UR STRIP-MCNC: NORMAL MG/DL
WBC # BLD AUTO: 4.9 10E3/UL (ref 4–11)
WBC URINE: 40 /HPF
YEAST #/AREA URNS HPF: ABNORMAL /HPF

## 2021-08-20 PROCEDURE — 250N000011 HC RX IP 250 OP 636: Performed by: EMERGENCY MEDICINE

## 2021-08-20 PROCEDURE — 250N000009 HC RX 250: Performed by: EMERGENCY MEDICINE

## 2021-08-20 PROCEDURE — 85025 COMPLETE CBC W/AUTO DIFF WBC: CPT | Performed by: EMERGENCY MEDICINE

## 2021-08-20 PROCEDURE — 99284 EMERGENCY DEPT VISIT MOD MDM: CPT | Performed by: EMERGENCY MEDICINE

## 2021-08-20 PROCEDURE — 87086 URINE CULTURE/COLONY COUNT: CPT | Performed by: EMERGENCY MEDICINE

## 2021-08-20 PROCEDURE — 81001 URINALYSIS AUTO W/SCOPE: CPT | Performed by: EMERGENCY MEDICINE

## 2021-08-20 PROCEDURE — 99283 EMERGENCY DEPT VISIT LOW MDM: CPT | Performed by: EMERGENCY MEDICINE

## 2021-08-20 PROCEDURE — 80048 BASIC METABOLIC PNL TOTAL CA: CPT | Performed by: EMERGENCY MEDICINE

## 2021-08-20 PROCEDURE — 36415 COLL VENOUS BLD VENIPUNCTURE: CPT | Performed by: EMERGENCY MEDICINE

## 2021-08-20 PROCEDURE — 96372 THER/PROPH/DIAG INJ SC/IM: CPT | Performed by: EMERGENCY MEDICINE

## 2021-08-20 RX ORDER — ONDANSETRON 4 MG/1
4 TABLET, ORALLY DISINTEGRATING ORAL ONCE
Status: COMPLETED | OUTPATIENT
Start: 2021-08-20 | End: 2021-08-20

## 2021-08-20 RX ORDER — CEFUROXIME AXETIL 250 MG/1
250 TABLET ORAL 2 TIMES DAILY
Qty: 14 TABLET | Refills: 0 | Status: SHIPPED | OUTPATIENT
Start: 2021-08-20 | End: 2021-08-22

## 2021-08-20 RX ORDER — CEFTRIAXONE SODIUM 1 G
1 VIAL (EA) INJECTION ONCE
Status: COMPLETED | OUTPATIENT
Start: 2021-08-20 | End: 2021-08-20

## 2021-08-20 RX ADMIN — LIDOCAINE HYDROCHLORIDE 5 ML: 10 INJECTION, SOLUTION EPIDURAL; INFILTRATION; INTRACAUDAL; PERINEURAL at 10:40

## 2021-08-20 RX ADMIN — ONDANSETRON 4 MG: 4 TABLET, ORALLY DISINTEGRATING ORAL at 10:41

## 2021-08-20 RX ADMIN — CEFTRIAXONE SODIUM 1 G: 1 INJECTION, POWDER, FOR SOLUTION INTRAMUSCULAR; INTRAVENOUS at 10:41

## 2021-08-20 ASSESSMENT — ENCOUNTER SYMPTOMS
CHEST TIGHTNESS: 0
ABDOMINAL PAIN: 1
CHILLS: 0
NAUSEA: 1
DIARRHEA: 0
ARTHRALGIAS: 0
DYSURIA: 1
FEVER: 0
AGITATION: 0
CONSTIPATION: 0
SHORTNESS OF BREATH: 0
LIGHT-HEADEDNESS: 0

## 2021-08-20 ASSESSMENT — MIFFLIN-ST. JEOR: SCORE: 1078.18

## 2021-08-20 NOTE — ED PROVIDER NOTES
History     Chief Complaint   Patient presents with     Nausea & Vomiting     HPI  Bernarda Ramírez is a 86 year old female who was seen in clinic yesterday for wellness exam and said she was feeling quite well.  Today she had some breakfast and [place]  While there she started feeling dizzy and nauseous.  Also some abdominal discomfort.  She believes she probably has a bladder infection as she has some discomfort in her suprapubic area.  She is quite nauseous and holding emesis bag.  She says her stomach is just rolling.  She is burping and passing gas.  No longer feeling dizzy and lightheaded while lying in the bed.  No fevers or chills.  Denies chest pain heaviness tightness squeezing shortness of breath.  No URI type symptoms.    Allergies:  Allergies   Allergen Reactions     Sulfa Drugs Unknown       Problem List:    Patient Active Problem List    Diagnosis Date Noted     Syncope 07/28/2020     Priority: Medium     UTI (urinary tract infection) 07/28/2020     Priority: Medium     Stenosis of left vertebral artery 07/28/2020     Priority: Medium     Hypercalcemia 02/22/2019     Priority: Medium     Vitamin D deficiency 02/22/2010     Priority: Medium     Dyspepsia and disorder of function of stomach 11/10/2003     Priority: Medium     Acid peptic disease  IMO Update 10/11       Breast neoplasm 09/05/2003     Priority: Medium     Ill-defined nodular density in upper outer aspect of right breast on U/S and mammogram today; steriotactic BX scheduled in [place]  IMO Update 10/11       Convulsions (H) 11/18/2002     Priority: Medium     Seizure disorder, Petit small seizures, curently stable  IMO Update 10/11       Essential hypertension 11/18/2002     Priority: Medium     IMO Update       Bundle branch block 07/22/2002     Priority: Medium     Noted on EKG 06/17/03  IMO Update 10/11          Past Medical History:    History reviewed. No pertinent past medical history.    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 Weeks at [facility]     RELEASE TRIGGER FINGER      right thumb       Family History:    History reviewed. No pertinent family history.    Social History:  Marital Status:   [5]  Social History     Tobacco Use     Smoking status: Never Smoker     Smokeless tobacco: Never Used   Substance Use Topics     Alcohol use: Not Currently     Drug use: Never        Medications:    cefuroxime (CEFTIN) 250 MG tablet  acetaminophen (TYLENOL) 650 MG CR tablet  aspirin (ASA) 81 MG EC tablet  atorvastatin (LIPITOR) 20 MG tablet  hydrochlorothiazide (HYDRODIURIL) 12.5 MG tablet  lisinopril (ZESTRIL) 20 MG tablet  Lutein 6 MG TABS  vitamin D3 (CHOLECALCIFEROL) 50 mcg (2000 units) tablet          Review of Systems   Constitutional: Negative for chills and fever.   HENT: Negative for congestion.    Eyes: Negative for visual disturbance.   Respiratory: Negative for chest tightness and shortness of breath.    Cardiovascular: Negative for chest pain.   Gastrointestinal: Positive for abdominal pain and nausea. Negative for constipation and diarrhea.   Genitourinary: Positive for dysuria.   Musculoskeletal: Negative for arthralgias.   Skin: Negative for rash.   Neurological: Negative for light-headedness.   Psychiatric/Behavioral: Negative for agitation.       Physical Exam   BP: 134/54  Pulse: 79  Temp: 98  F (36.7  C)  Resp: 16  Height: 152.4 cm (5')  Weight: 71.7 kg (158 lb)  SpO2: 98 %  Lying Orthostatic BP: 136/62  Lying Orthostatic Pulse: 88 bpm  Sitting Orthostatic BP: 142/62  Sitting Orthostatic Pulse: 74 bpm      Physical  Exam  Vitals and nursing note reviewed.   Constitutional:       Appearance: Normal appearance.   HENT:      Head: Normocephalic and atraumatic.      Mouth/Throat:      Mouth: Mucous membranes are moist.   Cardiovascular:      Rate and Rhythm: Normal rate and regular rhythm.      Comments: 2 out of 6 to 3 out of 6 systolic murmur  Pulmonary:      Effort: Pulmonary effort is normal.      Breath sounds: Normal breath sounds.   Abdominal:      General: Abdomen is flat. Bowel sounds are normal. There is no distension.      Comments: Tender epigastric and suprapubic areas.   Skin:     General: Skin is warm and dry.   Neurological:      Mental Status: She is alert and oriented to person, place, and time.   Psychiatric:         Behavior: Behavior normal.         ED Course        Procedures                Results for orders placed or performed during the hospital encounter of 08/20/21 (from the past 24 hour(s))   UA reflex to Microscopic   Result Value Ref Range    Color Urine Yellow Colorless, Straw, Light Yellow, Yellow    Appearance Urine Slightly Cloudy (A) Clear    Glucose Urine Negative Negative mg/dL    Bilirubin Urine Negative Negative    Ketones Urine Negative Negative mg/dL    Specific Gravity Urine 1.018 1.000 - 1.030    Blood Urine Negative Negative    pH Urine 8.0 5.0 - 9.0    Protein Albumin Urine 50  (A) Negative mg/dL    Urobilinogen Urine Normal Normal, 2.0 mg/dL    Nitrite Urine Positive (A) Negative    Leukocyte Esterase Urine Large (A) Negative    Bacteria Urine Many (A) None Seen /HPF    RBC Urine 9 (H) <=2 /HPF    WBC Urine 40 (H) <=5 /HPF    Squamous Epithelials Urine 4 (H) <=1 /HPF    Budding Yeast Urine Few (A) None Seen /HPF    Mucus Urine Present (A) None Seen /LPF    Triple Phosphate Crystals Urine Few (A) None Seen /HPF   CBC with platelets differential    Narrative    The following orders were created for panel order CBC with platelets differential.  Procedure                                Abnormality         Status                     ---------                               -----------         ------                     CBC with platelets and d...[[id number]]                      Final result                 Please view results for these tests on the individual orders.   Basic metabolic panel   Result Value Ref Range    Sodium 137 134 - 144 mmol/L    Potassium 4.4 3.5 - 5.1 mmol/L    Chloride 103 98 - 107 mmol/L    Carbon Dioxide (CO2) 26 21 - 31 mmol/L    Anion Gap 8 3 - 14 mmol/L    Urea Nitrogen 27 (H) 7 - 25 mg/dL    Creatinine 1.36 (H) 0.60 - 1.20 mg/dL    Calcium 10.3 8.6 - 10.3 mg/dL    Glucose 131 (H) 70 - 105 mg/dL    GFR Estimate 35 (L) >60 mL/min/1.73m2   Extra Tube    Narrative    The following orders were created for panel order Extra Tube.  Procedure                               Abnormality         Status                     ---------                               -----------         ------                     Extra Blue Top Tube[[id number]]                              Final result               Extra Serum Separator Tu...[[id number]]                      Final result               Extra Purple Top Tube[[id number]]                                                       Extra Green Top (Lithium...[[id number]]                      Final result                 Please view results for these tests on the individual orders.   Extra Blue Top Tube   Result Value Ref Range    Hold Specimen JIC    Extra Serum Separator Tube (SST)   Result Value Ref Range    Hold Specimen JIC    Extra Green Top (Lithium Heparin) ON ICE   Result Value Ref Range    Hold Specimen JIC    CBC with platelets and differential   Result Value Ref Range    WBC Count 4.9 4.0 - 11.0 10e3/uL    RBC Count 4.55 3.80 - 5.20 10e6/uL    Hemoglobin 13.4 11.7 - 15.7 g/dL    Hematocrit 40.8 35.0 - 47.0 %    MCV 90 78 - 100 fL    MCH 29.5 26.5 - 33.0 pg    MCHC 32.8 31.5 - 36.5 g/dL    RDW 12.8 10.0 - 15.0 %    Platelet Count 191 150 - 450  10e3/uL    % Neutrophils 70 %    % Lymphocytes 19 %    % Monocytes 8 %    % Eosinophils 2 %    % Basophils 1 %    % Immature Granulocytes 0 %    NRBCs per 100 WBC 0 <1 /100    Absolute Neutrophils 3.4 1.6 - 8.3 10e3/uL    Absolute Lymphocytes 0.9 0.8 - 5.3 10e3/uL    Absolute Monocytes 0.4 0.0 - 1.3 10e3/uL    Absolute Eosinophils 0.1 0.0 - 0.7 10e3/uL    Absolute Basophils 0.0 0.0 - 0.2 10e3/uL    Absolute Immature Granulocytes 0.0 <=0.0 10e3/uL    Absolute NRBCs 0.0 10e3/uL       Medications   ondansetron (ZOFRAN-ODT) ODT tab 4 mg (4 mg Oral Given 8/20/21 1041)   cefTRIAXone (ROCEPHIN) injection 1 g (1 g Intramuscular Given 8/20/21 1041)   lidocaine 1 % 5 mL (5 mLs Other Given 8/20/21 1040)       Assessments & Plan (with Medical Decision Making)     I have reviewed the nursing notes.    I have reviewed the findings, diagnosis, plan and need for follow up with the patient.  Patient with urinary tract infection.  Has elevated creatinine likely indicating some dehydration.  She is feeling better after having some bowel movements while here.  We will discharge her to home with prescription for Ceftin.  Told her to drink more fluids.  Return if worse.    New Prescriptions    CEFUROXIME (CEFTIN) 250 MG TABLET    Take 1 tablet (250 mg) by mouth 2 times daily for 7 days       Final diagnoses:   Urinary tract infection without hematuria, site unspecified       8/20/2021   [facility] AND [facility]     Santiago Beaulieu MD  08/20/21 1317

## 2021-08-20 NOTE — ED TRIAGE NOTES
ED Nursing Triage Note (General)   ________________________________    Bernarda REZA Ramírez is a 86 year old Female that presents to triage private car  With history of  FEELING UNWELL TODAY WITH DIZZINESS, NAUSEA, ABDOMINAL PAIN AND URINARY SYMPTOMS.  reported by patient     /54   Pulse 79   Temp 98  F (36.7  C) (Oral)   Resp 16   Ht 1.524 m (5')   Wt 71.7 kg (158 lb)   LMP  (LMP Unknown)   SpO2 98%   BMI 30.86 kg/m  t  Patient appears alert  and oriented, in mild distress., and cooperative and calm behavior.    GCS Total = 15  Airway: intact  Breathing noted as Normal  Circulation Normal  Skin:  Normal  Action taken:        PRE HOSPITAL PRIOR LIVING SITUATION Spouse

## 2021-08-22 ENCOUNTER — TELEPHONE (OUTPATIENT)
Dept: EMERGENCY MEDICINE | Facility: OTHER | Age: 86
End: 2021-08-22

## 2021-08-22 DIAGNOSIS — N39.0 URINARY TRACT INFECTION: ICD-10-CM

## 2021-08-22 LAB — BACTERIA UR CULT: ABNORMAL

## 2021-08-22 RX ORDER — CEFPODOXIME PROXETIL 200 MG/1
200 TABLET, FILM COATED ORAL DAILY
Qty: 5 TABLET | Refills: 0 | Status: SHIPPED | OUTPATIENT
Start: 2021-08-22 | End: 2021-08-27

## 2021-08-22 NOTE — TELEPHONE ENCOUNTER
[facility] Emergency Department Lab result notification [Adult-Female]    [place] ED lab result protocol used  Urine culture    Reason for call  Notify of lab results, assess symptoms,  review ED providers recommendations/discharge instructions (if necessary) and advise per ED lab result f/u protocol    Lab Result (including Rx patient on, if applicable)  Final Urine Culture Report on 8/22/21  [facility] Emergency Dept discharge antibiotic prescribed: Cefuroxime (Ceftin) 250 mg PO tablet, 1 tablet by mouth 2 times daily for 7 days  #1. Bacteria, >100,000 CFU/mL Escherichia coli, is [RESISTANT] to antibiotic.   Recommendations in treatment per [facility] ED lab result Urine Culture protocol.    Information table from Emergency Dept Provider visit on 8/20/21  Symptoms reported at ED visit (Chief complaint, HPI) Nausea & Vomiting      HPI  Bernarda Ramírez is a 86 year old female who was seen in clinic yesterday for wellness exam and said she was feeling quite well.  Today she had some breakfast and then [place]  While there she started feeling dizzy and nauseous.  Also some abdominal discomfort.  She believes she probably has a bladder infection as she has some discomfort in her suprapubic area.  She is quite nauseous and holding emesis bag.  She says her stomach is just rolling.  She is burping and passing gas.  No longer feeling dizzy and lightheaded while lying in the bed.  No fevers or chills.  Denies chest pain heaviness tightness squeezing shortness of breath.  No URI type symptoms.     Significant Medical hx, if applicable (i.e. CKD, diabetes) Reviewed   Allergies Allergies   Allergen Reactions     Sulfa Drugs Unknown      Weight, if applicable Wt Readings from Last 2 Encounters:   08/20/21 71.7 kg (158 lb)   08/19/21 70.8 kg (156 lb)      Coumadin/Warfarin [Yes /No] No   Creatinine Level (mg/dl) Creatinine   Date Value Ref Range Status   08/20/2021 1.36 (H) 0.60 -  1.20 mg/dL Final   07/28/2020 0.95 0.60 - 1.20 mg/dL Final      Creatinine clearance (ml/min), if applicable Serum creatinine: 1.36 mg/dL (H) 08/20/21 1045  Estimated creatinine clearance: 26.3 mL/min (A)   ED providers Impression and Plan (applicable information) Patient with urinary tract infection.  Has elevated creatinine likely indicating some dehydration.  She is feeling better after having some bowel movements while here.  We will discharge her to home with prescription for Ceftin.  Told her to drink more fluids.  Return if worse.   ED diagnosis Urinary tract infection without hematuria, site unspecified   ED provider Santiago Beaulieu MD      RN Assessment (Patient s current Symptoms), include time called.  [Insert Left message here if message left]  9:45AM: Spoke with patient. She states she is tired. Denies feeling worse than when she was in the ED. Denies any fever or vomiting.     RN Recommendations/Instructions per [facility] ED lab result protocol  Patient notified of lab result and treatment recommendations.  Rx for cefpodoxime (VANTIN) 200 MG tablet, Take 1 tablet (200 mg) by mouth daily for 5 days sent to [Pharmacy - [facility] in [place]].  RN reviewed information about UTI's.  Advised to increase fluids.  Advised to stop Ceftin and start Vantin.  Advised to follow up with her PCP as directed by the ED provider.  The patient is comfortable with the information given and has no further questions.      Please Contact your PCP clinic or return to the Emergency department if your:    Symptoms do not resolve after completing antibiotic.    Symptoms worsen or other concerning symptom's.    PCP follow-up Questions asked: YES       Gabriela Katz RN  [facility] [place]  Emergency Dept Lab Result RN  # [phone]     Copy of Lab result   Contains abnormal data Urine Culture Aerobic Bacterial  Order: [id number]  Collected:  8/20/2021  9:57 AM Status:  Final result   Visible to  patient:  No (scheduled for 8/22/2021 10:33 AM)  Specimen Information: Urine, Clean Catch         0 Result Notes  Culture >100,000 CFU/mL Escherichia coliAbnormal           Resulting Agency: [facility]   Susceptibility     Escherichia coli     HECTOR     Amoxicillin/Clavulanate <=8  Susceptible     Ampicillin <=8  Susceptible     Ampicillin/ Sulbactam <=8  Susceptible     Aztreonam <=4  Susceptible     Cefazolin 4  Susceptible     Cefepime <=2  Susceptible     Cefoxitin <=8  Susceptible     Cefpodoxime <=2  Susceptible     Ceftazidime <=1  Susceptible     Ceftriaxone <=1  Susceptible     Cefuroxime >16  Resistant     Ciprofloxacin >2  Resistant     Ertapenem <=0.5  Susceptible     Gentamicin <=4  Susceptible     Imipenem <=1  Susceptible     Levofloxacin >4  Resistant     Nitrofurantoin <=32  Susceptible     Piperacillin/Tazobactam <=16  Susceptible     Tetracycline >8  Resistant     Tobramycin <=4  Susceptible     Trimethoprim >8  Resistant     Trimethoprim/Sulfa >2/38  Resistant                 Specimen Collected: 08/20/21  9:57 AM Last Resulted: 08/22/21  9:33 AM

## 2021-09-07 ENCOUNTER — HOSPITAL ENCOUNTER (EMERGENCY)
Facility: OTHER | Age: 86
Discharge: HOME | End: 2021-09-07
Attending: EMERGENCY MEDICINE | Admitting: EMERGENCY MEDICINE
Payer: MEDICARE

## 2021-09-07 VITALS
HEART RATE: 66 BPM | BODY MASS INDEX: 31.02 KG/M2 | OXYGEN SATURATION: 99 % | DIASTOLIC BLOOD PRESSURE: 47 MMHG | SYSTOLIC BLOOD PRESSURE: 127 MMHG | HEIGHT: 60 IN | RESPIRATION RATE: 16 BRPM | WEIGHT: 158 LBS | TEMPERATURE: 98.7 F

## 2021-09-07 DIAGNOSIS — N39.0 URINARY TRACT INFECTION WITHOUT HEMATURIA, SITE UNSPECIFIED: ICD-10-CM

## 2021-09-07 LAB
ALBUMIN UR-MCNC: NEGATIVE MG/DL
ANION GAP SERPL CALCULATED.3IONS-SCNC: 7 MMOL/L (ref 3–14)
APPEARANCE UR: CLEAR
BACTERIA #/AREA URNS HPF: ABNORMAL /HPF
BASOPHILS # BLD AUTO: 0 10E3/UL (ref 0–0.2)
BASOPHILS NFR BLD AUTO: 1 %
BILIRUB UR QL STRIP: NEGATIVE
BUN SERPL-MCNC: 27 MG/DL (ref 7–25)
CALCIUM SERPL-MCNC: 11.3 MG/DL (ref 8.6–10.3)
CHLORIDE BLD-SCNC: 101 MMOL/L (ref 98–107)
CO2 SERPL-SCNC: 29 MMOL/L (ref 21–31)
COLOR UR AUTO: ABNORMAL
CREAT SERPL-MCNC: 1.53 MG/DL (ref 0.6–1.2)
EOSINOPHIL # BLD AUTO: 0 10E3/UL (ref 0–0.7)
EOSINOPHIL NFR BLD AUTO: 1 %
ERYTHROCYTE [DISTWIDTH] IN BLOOD BY AUTOMATED COUNT: 12.8 % (ref 10–15)
GFR SERPL CREATININE-BSD FRML MDRD: 31 ML/MIN/1.73M2
GLUCOSE BLD-MCNC: 104 MG/DL (ref 70–105)
GLUCOSE UR STRIP-MCNC: NEGATIVE MG/DL
HCT VFR BLD AUTO: 42.3 % (ref 35–47)
HGB BLD-MCNC: 13.8 G/DL (ref 11.7–15.7)
HGB UR QL STRIP: NEGATIVE
IMM GRANULOCYTES # BLD: 0 10E3/UL
IMM GRANULOCYTES NFR BLD: 1 %
KETONES UR STRIP-MCNC: NEGATIVE MG/DL
LEUKOCYTE ESTERASE UR QL STRIP: ABNORMAL
LYMPHOCYTES # BLD AUTO: 1.3 10E3/UL (ref 0.8–5.3)
LYMPHOCYTES NFR BLD AUTO: 23 %
MCH RBC QN AUTO: 29.1 PG (ref 26.5–33)
MCHC RBC AUTO-ENTMCNC: 32.6 G/DL (ref 31.5–36.5)
MCV RBC AUTO: 89 FL (ref 78–100)
MONOCYTES # BLD AUTO: 0.4 10E3/UL (ref 0–1.3)
MONOCYTES NFR BLD AUTO: 8 %
NEUTROPHILS # BLD AUTO: 3.8 10E3/UL (ref 1.6–8.3)
NEUTROPHILS NFR BLD AUTO: 66 %
NITRATE UR QL: NEGATIVE
NRBC # BLD AUTO: 0 10E3/UL
NRBC BLD AUTO-RTO: 0 /100
PH UR STRIP: 6.5 [PH] (ref 5–9)
PLATELET # BLD AUTO: 218 10E3/UL (ref 150–450)
POTASSIUM BLD-SCNC: 5.2 MMOL/L (ref 3.5–5.1)
RBC # BLD AUTO: 4.75 10E6/UL (ref 3.8–5.2)
RBC URINE: 1 /HPF
SODIUM SERPL-SCNC: 137 MMOL/L (ref 134–144)
SP GR UR STRIP: 1.01 (ref 1–1.03)
UROBILINOGEN UR STRIP-MCNC: NORMAL MG/DL
WBC # BLD AUTO: 5.6 10E3/UL (ref 4–11)
WBC URINE: 10 /HPF

## 2021-09-07 PROCEDURE — 99283 EMERGENCY DEPT VISIT LOW MDM: CPT | Performed by: EMERGENCY MEDICINE

## 2021-09-07 PROCEDURE — 81001 URINALYSIS AUTO W/SCOPE: CPT | Performed by: EMERGENCY MEDICINE

## 2021-09-07 PROCEDURE — 258N000003 HC RX IP 258 OP 636: Performed by: EMERGENCY MEDICINE

## 2021-09-07 PROCEDURE — 87086 URINE CULTURE/COLONY COUNT: CPT | Performed by: EMERGENCY MEDICINE

## 2021-09-07 PROCEDURE — 96360 HYDRATION IV INFUSION INIT: CPT | Performed by: EMERGENCY MEDICINE

## 2021-09-07 PROCEDURE — 99283 EMERGENCY DEPT VISIT LOW MDM: CPT | Mod: 25 | Performed by: EMERGENCY MEDICINE

## 2021-09-07 PROCEDURE — 85025 COMPLETE CBC W/AUTO DIFF WBC: CPT | Performed by: EMERGENCY MEDICINE

## 2021-09-07 PROCEDURE — 80048 BASIC METABOLIC PNL TOTAL CA: CPT | Performed by: EMERGENCY MEDICINE

## 2021-09-07 PROCEDURE — 36415 COLL VENOUS BLD VENIPUNCTURE: CPT | Performed by: EMERGENCY MEDICINE

## 2021-09-07 RX ORDER — AMOXICILLIN AND CLAVULANATE POTASSIUM 500; 125 MG/1; MG/1
1 TABLET, FILM COATED ORAL 2 TIMES DAILY
Qty: 14 TABLET | Refills: 0 | Status: SHIPPED | OUTPATIENT
Start: 2021-09-07 | End: 2021-09-14

## 2021-09-07 RX ADMIN — SODIUM CHLORIDE 1000 ML: 9 INJECTION, SOLUTION INTRAVENOUS at 14:02

## 2021-09-07 ASSESSMENT — ENCOUNTER SYMPTOMS
CHILLS: 0
CHEST TIGHTNESS: 0
DYSURIA: 1
FEVER: 0
SHORTNESS OF BREATH: 0
NAUSEA: 0
LIGHT-HEADEDNESS: 0
AGITATION: 0
VOMITING: 0
FREQUENCY: 0
ARTHRALGIAS: 0

## 2021-09-07 ASSESSMENT — MIFFLIN-ST. JEOR: SCORE: 1078.18

## 2021-09-07 NOTE — ED PROVIDER NOTES
History     Chief Complaint   Patient presents with     Rule out Urinary Tract Infection     HPI  Bernarda Ramírez is a 86 year old female who is here concerned she may have urinary tract infection.  She was diagnosed with a urinary tract infection on August 20.  She was placed on Ceftin for this, however culture showed it was resistant to this but not other cephalosporins.  She was then placed on Vantin.  She seemed better but the last couple days is not feeling so well.  She does not report dysuria while urinating, however soon as she is done it burns.  Not feeling like she has to go more often.  No fevers or chills.  She does have chronic nausea vomiting, she says is really not any worse than normal.  Also wondering why she gets dizzy at times but she states she is trying to drink enough liquids.  No diarrhea or constipation    Allergies:  Allergies   Allergen Reactions     Sulfa Drugs Unknown       Problem List:    Patient Active Problem List    Diagnosis Date Noted     Syncope 07/28/2020     Priority: Medium     UTI (urinary tract infection) 07/28/2020     Priority: Medium     Stenosis of left vertebral artery 07/28/2020     Priority: Medium     Hypercalcemia 02/22/2019     Priority: Medium     Vitamin D deficiency 02/22/2010     Priority: Medium     Dyspepsia and disorder of function of stomach 11/10/2003     Priority: Medium     Acid peptic disease  IMO Update 10/11       Breast neoplasm 09/05/2003     Priority: Medium     Ill-defined nodular density in upper outer aspect of right breast on U/S and mammogram today; steriotactic BX scheduled in [place]  IMO Update 10/11       Convulsions (H) 11/18/2002     Priority: Medium     Seizure disorder, Petit small seizures, curently stable  IMO Update 10/11       Essential hypertension 11/18/2002     Priority: Medium     IMO Update       Bundle branch block 07/22/2002     Priority: Medium     Noted on EKG 06/17/03  IMO Update 10/11          Past Medical History:     History reviewed. No pertinent past medical history.    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 Weeks at [facility]     RELEASE TRIGGER FINGER      right thumb       Family History:    History reviewed. No pertinent family history.    Social History:  Marital Status:   [5]  Social History     Tobacco Use     Smoking status: Never Smoker     Smokeless tobacco: Never Used   Substance Use Topics     Alcohol use: Not Currently     Drug use: Never        Medications:    amoxicillin-clavulanate (AUGMENTIN) 500-125 MG tablet  acetaminophen (TYLENOL) 650 MG CR tablet  aspirin (ASA) 81 MG EC tablet  atorvastatin (LIPITOR) 20 MG tablet  hydrochlorothiazide (HYDRODIURIL) 12.5 MG tablet  lisinopril (ZESTRIL) 20 MG tablet  Lutein 6 MG TABS  vitamin D3 (CHOLECALCIFEROL) 50 mcg (2000 units) tablet          Review of Systems   Constitutional: Negative for chills and fever.   HENT: Negative for congestion.    Eyes: Negative for visual disturbance.   Respiratory: Negative for chest tightness and shortness of breath.    Cardiovascular: Negative for chest pain.   Gastrointestinal: Negative for nausea and vomiting.   Genitourinary: Positive for dysuria. Negative for frequency.   Musculoskeletal: Negative for arthralgias.   Skin: Negative for rash.   Neurological: Negative for light-headedness.   Psychiatric/Behavioral: Negative for agitation.       Physical Exam   BP: 137/47  Pulse: 74  Temp: 98.7  F (37.1  C)  Resp: 16  Height: 152.4 cm (5')  Weight: 71.7 kg (158 lb)  SpO2: 97 %      Physical Exam  Vitals and nursing note reviewed.    Constitutional:       Appearance: Normal appearance.   HENT:      Head: Normocephalic and atraumatic.      Mouth/Throat:      Mouth: Mucous membranes are moist.   Eyes:      Conjunctiva/sclera: Conjunctivae normal.   Cardiovascular:      Rate and Rhythm: Normal rate and regular rhythm.      Heart sounds: Murmur heard.     Pulmonary:      Effort: Pulmonary effort is normal.      Breath sounds: Normal breath sounds.   Abdominal:      Palpations: Abdomen is soft.   Skin:     General: Skin is warm and dry.   Neurological:      Mental Status: She is alert and oriented to person, place, and time.   Psychiatric:         Behavior: Behavior normal.         ED Course        Procedures             Results for orders placed or performed during the hospital encounter of 09/07/21 (from the past 24 hour(s))   UA with Microscopic reflex to Culture    Specimen: Urine, Midstream   Result Value Ref Range    Color Urine Light Yellow Colorless, Straw, Light Yellow, Yellow    Appearance Urine Clear Clear    Glucose Urine Negative Negative mg/dL    Bilirubin Urine Negative Negative    Ketones Urine Negative Negative mg/dL    Specific Gravity Urine 1.012 1.000 - 1.030    Blood Urine Negative Negative    pH Urine 6.5 5.0 - 9.0    Protein Albumin Urine Negative Negative mg/dL    Urobilinogen Urine Normal Normal, 2.0 mg/dL    Nitrite Urine Negative Negative    Leukocyte Esterase Urine Moderate (A) Negative    Bacteria Urine Few (A) None Seen /HPF    RBC Urine 1 <=2 /HPF    WBC Urine 10 (H) <=5 /HPF    Narrative    Urine Culture ordered based on laboratory criteria   Basic metabolic panel   Result Value Ref Range    Sodium 137 134 - 144 mmol/L    Potassium 5.2 (H) 3.5 - 5.1 mmol/L    Chloride 101 98 - 107 mmol/L    Carbon Dioxide (CO2) 29 21 - 31 mmol/L    Anion Gap 7 3 - 14 mmol/L    Urea Nitrogen 27 (H) 7 - 25 mg/dL    Creatinine 1.53 (H) 0.60 - 1.20 mg/dL    Calcium 11.3 (H) 8.6 - 10.3 mg/dL    Glucose 104 70 - 105 mg/dL    GFR Estimate  31 (L) >60 mL/min/1.73m2   CBC with platelets differential    Narrative    The following orders were created for panel order CBC with platelets differential.  Procedure                               Abnormality         Status                     ---------                               -----------         ------                     CBC with platelets and d...[[id number]]                      Final result                 Please view results for these tests on the individual orders.   CBC with platelets and differential   Result Value Ref Range    WBC Count 5.6 4.0 - 11.0 10e3/uL    RBC Count 4.75 3.80 - 5.20 10e6/uL    Hemoglobin 13.8 11.7 - 15.7 g/dL    Hematocrit 42.3 35.0 - 47.0 %    MCV 89 78 - 100 fL    MCH 29.1 26.5 - 33.0 pg    MCHC 32.6 31.5 - 36.5 g/dL    RDW 12.8 10.0 - 15.0 %    Platelet Count 218 150 - 450 10e3/uL    % Neutrophils 66 %    % Lymphocytes 23 %    % Monocytes 8 %    % Eosinophils 1 %    % Basophils 1 %    % Immature Granulocytes 1 %    NRBCs per 100 WBC 0 <1 /100    Absolute Neutrophils 3.8 1.6 - 8.3 10e3/uL    Absolute Lymphocytes 1.3 0.8 - 5.3 10e3/uL    Absolute Monocytes 0.4 0.0 - 1.3 10e3/uL    Absolute Eosinophils 0.0 0.0 - 0.7 10e3/uL    Absolute Basophils 0.0 0.0 - 0.2 10e3/uL    Absolute Immature Granulocytes 0.0 <=0.0 10e3/uL    Absolute NRBCs 0.0 10e3/uL       Medications   0.9% sodium chloride BOLUS (1,000 mLs Intravenous New Bag 9/7/21 1402)       Assessments & Plan (with Medical Decision Making)     I have reviewed the nursing notes.    I have reviewed the findings, diagnosis, plan and need for follow up with the patient.  She does appear that she was slightly hydrated with increased creatinine and decreased GFR from previous visit.  Also mildly hyperkalemic he was given a liter of IV fluids, is feeling better.  We will start her on some Augmentin for UTI based on most recent culture.  No culture is also initiated.  Return if worse.    New Prescriptions    AMOXICILLIN-CLAVULANATE  (AUGMENTIN) 500-125 MG TABLET    Take 1 tablet by mouth 2 times daily for 7 days       Final diagnoses:   Urinary tract infection without hematuria, site unspecified       9/7/2021   [facility] AND [facility]     Santiago Beaulieu MD  09/07/21 0560

## 2021-09-07 NOTE — DISCHARGE INSTRUCTIONS
Follow-up in clinic if worse or not improving.  Would also recommend following up in clinic with your primary doctor for recheck in the near future.

## 2021-09-07 NOTE — ED TRIAGE NOTES
"ED Nursing Triage Note (General)   ________________________________    Bernarda REZA Ramírez is a 86 year old Female that presents to triage private car  With history of  Pt here with .   Pt states that she was seen \"awhile ago\" she was seen for a bladder infection.  Pt is not sure if she has another infection or not.  Pt states it does not \"burn\" when urinating but does just sitting.  Pt had a normal BM this morning, she feels nauseated, some dizziness.  Pt states these symptoms started maybe a week or two ago, pt is not sure exactly reported by patient.   Significant symptoms had onset 2 week(s) ago.    Patient appears alert , in no acute distress., and cooperative behavior.    GCS Eye Opening = 4=Spontaneous  Airway: intact  Breathing noted as Normal  Circulation Normal  Skin:  Normal  Action taken:  Triage to critical care immediately      PRE HOSPITAL PRIOR LIVING SITUATION Spouse    "

## 2021-09-09 ENCOUNTER — NURSE TRIAGE (OUTPATIENT)
Dept: FAMILY MEDICINE | Facility: OTHER | Age: 86
End: 2021-09-09

## 2021-09-09 LAB — BACTERIA UR CULT: ABNORMAL

## 2021-09-09 NOTE — TELEPHONE ENCOUNTER
Has been to the ER a few times she said they are tired of seeing her to go to her doc--we are totally booked out today and tomorrow---ER gave pint of blood then has UTI gave medication now above waist hurts and thinks dehydrated-could it be her medication

## 2021-09-09 NOTE — TELEPHONE ENCOUNTER
"  Reason for Disposition    [1] MILD-MODERATE pain AND [2] constant AND [3] present > 2 hours    Additional Information    Negative: Severe difficulty breathing (e.g., struggling for each breath, speaks in single words)    Negative: Shock suspected (e.g., cold/pale/clammy skin, too weak to stand, low BP, rapid pulse)    Negative: Difficult to awaken or acting confused (e.g., disoriented, slurred speech)    Negative: Passed out (i.e., lost consciousness, collapsed and was not responding)    Negative: Visible sweat on face or sweat dripping down face    Negative: Sounds like a life-threatening emergency to the triager    Negative: Followed an abdomen (stomach) injury    Negative: Chest pain    Negative: [1] Pregnant > 24 weeks AND [2] hand or face swelling    Negative: [1] SEVERE pain (e.g., excruciating) AND [2] present > 1 hour    Negative: [1] Pain lasts > 10 minutes AND [2] age > 40 AND [3] associated chest, arm, neck, upper back or jaw pain    Negative: [1] Pain lasts > 10 minutes AND [2] age > 35 AND [3] at least one cardiac risk factor (i.e., hypertension, diabetes, obesity, smoker or strong family history of heart disease)    Negative: [1] Pain lasts > 10 minutes AND [2] history of heart disease (i.e., heart attack, bypass surgery, angina, angioplasty, CHF; not just a heart murmur)    Negative: [1] Pain lasts > 10 minutes AND [2] difficulty breathing    Negative: [1] Vomiting AND [2] contains red blood  (Exception: few streaks and only occurred once)    Negative: [1] Vomiting AND [2] contains black (\"coffee ground\") material    Negative: Blood in bowel movements  (Exception: Blood on surface of BM with constipation)    Negative: Black or tarry bowel movements  (Exception: chronic-unchanged  black-grey bowel movements AND is taking iron pills or Pepto-bismol)    Negative: [1] Pain lasts > 10 minutes AND [2] age > 50    Negative: Patient sounds very sick or weak to the triager    Answer Assessment - Initial " "Assessment Questions  1. NAUSEA SEVERITY: \"How bad is the nausea?\" (e.g., mild, moderate, severe; dehydration, weight loss)    - MILD: loss of appetite without change in eating habits    - MODERATE: decreased oral intake without significant weight loss, dehydration, or malnutrition    - SEVERE: inadequate caloric or fluid intake, significant weight loss, symptoms of dehydration       moderate  2. ONSET: \"When did the nausea begin?\"      One and off for several days   3. VOMITING: \"Any vomiting?\" If so, ask: \"How many times today?\"      No. Eating well.   4. RECURRENT SYMPTOM: \"Have you had nausea before?\" If so, ask: \"When was the last time?\" \"What happened that time?\"      No nausea currently.   5. CAUSE: \"What do you think is causing the nausea?\"      Pain in stomach.   6. PREGNANCY: \"Is there any chance you are pregnant?\" (e.g., unprotected intercourse, missed birth control pill, broken condom)      No.    Answer Assessment - Initial Assessment Questions  1. LOCATION: \"Where does it hurt?\"       Above her waist and belly button.   2. RADIATION: \"Does the pain shoot anywhere else?\" (e.g., chest, back)      no  3. ONSET: \"When did the pain begin?\" (e.g., minutes, hours or days ago)       Couple weeks ago  4. SUDDEN: \"Gradual or sudden onset?\"      Gradual  5. PATTERN \"Does the pain come and go, or is it constant?\"     - If constant: \"Is it getting better, staying the same, or worsening?\"       (Note: Constant means the pain never goes away completely; most serious pain is constant and it progresses)      - If intermittent: \"How long does it last?\" \"Do you have pain now?\"      (Note: Intermittent means the pain goes away completely between bouts)      Comes and goes.   6. SEVERITY: \"How bad is the pain?\"  (e.g., Scale 1-10; mild, moderate, or severe) Moderate.      - MILD (1-3): doesn't interfere with normal activities, abdomen soft and not tender to touch      - MODERATE (4-7): interferes with normal activities or " "awakens from sleep, tender to touch      - SEVERE (8-10): excruciating pain, doubled over, unable to do any normal activities          7. RECURRENT SYMPTOM: \"Have you ever had this type of abdominal pain before?\" If so, ask: \"When was the last time?\" and \"What happened that time?\"       No.   8. AGGRAVATING FACTORS: \"Does anything seem to cause this pain?\" (e.g., foods, stress, alcohol)      No  9. CARDIAC SYMPTOMS: \"Do you have any of the following symptoms: chest pain, difficulty breathing, sweating, nausea?\"      Nausea.  10. OTHER SYMPTOMS: \"Do you have any other symptoms?\" (e.g., fever, vomiting, diarrhea)        No fever, no diarrhea  11. PREGNANCY: \"Is there any chance you are pregnant?\" \"When was your last menstrual period?\"        no    Protocols used: ABDOMINAL PAIN - UPPER-A-AH, NAUSEA-A-OH    "

## 2021-09-09 NOTE — RESULT ENCOUNTER NOTE
Final Urine Culture Report on 9/9/21  [facility] Emergency Dept discharge antibiotic prescribed: Amoxicillin-Clavulanate (Augmentin) 500-125 mg PO tablet, 1 tablet by mouth 2 times daily for 7 days  #1. Bacteria, >100,000 colonies/mL Escherichia coli, is SUSCEPTIBLE to Antibiotic.    Recommendations in treatment per [facility] ED lab result Urine Culture protocol.

## 2021-09-16 ENCOUNTER — OFFICE VISIT (OUTPATIENT)
Dept: NEUROLOGY | Facility: OTHER | Age: 86
End: 2021-09-16
Attending: PSYCHIATRY & NEUROLOGY
Payer: MEDICARE

## 2021-09-16 VITALS
HEART RATE: 82 BPM | OXYGEN SATURATION: 96 % | RESPIRATION RATE: 16 BRPM | SYSTOLIC BLOOD PRESSURE: 128 MMHG | TEMPERATURE: 98.7 F | DIASTOLIC BLOOD PRESSURE: 72 MMHG

## 2021-09-16 DIAGNOSIS — G56.03 BILATERAL CARPAL TUNNEL SYNDROME: Primary | ICD-10-CM

## 2021-09-16 DIAGNOSIS — G56.23 LESIONS OF BOTH ULNAR NERVES: ICD-10-CM

## 2021-09-16 PROCEDURE — 95886 MUSC TEST DONE W/N TEST COMP: CPT | Mod: 26 | Performed by: PSYCHIATRY & NEUROLOGY

## 2021-09-16 PROCEDURE — G0463 HOSPITAL OUTPT CLINIC VISIT: HCPCS

## 2021-09-16 PROCEDURE — 95886 MUSC TEST DONE W/N TEST COMP: CPT | Performed by: PSYCHIATRY & NEUROLOGY

## 2021-09-16 PROCEDURE — 99204 OFFICE O/P NEW MOD 45 MIN: CPT | Mod: 25 | Performed by: PSYCHIATRY & NEUROLOGY

## 2021-09-16 PROCEDURE — 95912 NRV CNDJ TEST 11-12 STUDIES: CPT | Mod: 26 | Performed by: PSYCHIATRY & NEUROLOGY

## 2021-09-16 PROCEDURE — 95912 NRV CNDJ TEST 11-12 STUDIES: CPT | Performed by: PSYCHIATRY & NEUROLOGY

## 2021-09-16 ASSESSMENT — PAIN SCALES - GENERAL: PAINLEVEL: NO PAIN (0)

## 2021-09-16 NOTE — NURSING NOTE
Chief Complaint   Patient presents with     Consult     Bilateral Upper Extremity       Initial /72 (BP Location: Right arm, Patient Position: Chair, Cuff Size: Adult Regular)   Pulse 82   Temp 98.7  F (37.1  C) (Tympanic)   Resp 16   LMP  (LMP Unknown)   SpO2 96%   Breastfeeding No  Estimated body mass index is 30.86 kg/m  as calculated from the following:    Height as of 9/7/21: 1.524 m (5').    Weight as of 9/7/21: 71.7 kg (158 lb).  Medication Reconciliation: complete  FOOD SECURITY SCREENING QUESTIONS  Hunger Vital Signs:  Within the past 12 months we worried whether our food would run out before we got money to buy more. Never  Within the past 12 months the food we bought just didn't last and we didn't have money to get more. Never        Jordyn Strickland LPN on 9/16/2021 at 1:18 PM

## 2021-09-16 NOTE — PROGRESS NOTES
Visit Date: 09/16/2021    NEURODIAGNOSTICS CONSULTATION    REFERRING PHYSICIAN:  Abiel Brambila MD    HISTORY OF PRESENT ILLNESS:  The patient is a pleasant 86-year-old woman who is unfortunately a vague historian.  For an ill-defined length of time, she has had paresthesia and sensory loss in the right greater than left hand.  Remarkably, she does not seem to be aware of weakness in the right hand which on physical exam is actually profound.  She does not complain of neck or radicular quality pain.    PAST MEDICAL HISTORY:  Significant for hypertension and obesity, but negative for diabetes.    REVIEW OF SYSTEMS:  A 10-system review of 10-system is positive for low back pain and fairly diffuse arthralgia.    PHYSICAL EXAMINATION:  The patient is 5 feet tall.  Blood pressure 128/72 and she is afebrile.  She ambulates with difficulty and requires assistance.  There is severe weakness on the right for the interossei and the abductors of the thumb with mild weakness of these muscle groups on the left.  Proximal strength is symmetric.  Reflexes are hypoactive throughout.  Pinprick is reduced for all of the digits of the right hand.    NERVE CONDUCTION STUDIES:  Motor nerve conduction testing was performed bilaterally for the median and ulnar nerves.  There was very severe latency prolongation, conduction block and slowing at the wrist for the right median nerve with moderately severe latency prolongation and conduction block for the left.  There was very severe slowing and conduction block for the right ulnar nerve at the elbow with mild slowing for the left ulnar nerve at the elbow.  H reflexes were absent for both ulnar nerves, consistent with conduction block at the elbow.    MONOPOLAR EMG NEEDLE EXAMINATION:  Monopolar EMG needle exam was performed bilaterally for the abductor pollicis brevis, first dorsal interosseous, triceps, brachioradialis and extensor pollicis brevis.  All the tested muscles showed normal  insertional activity.  There were severe chronic denervation changes for the right abductor pollicis brevis and first dorsal interosseous with mild denervation changes for the left abductor pollicis brevis.    IMPRESSION:  The patient has bilateral carpal tunnel syndrome and bilateral cubital tunnel syndrome.  The carpal tunnel syndrome is very severe on the right and moderate grade on the left.  Even on the left, the degree of conduction block is great enough that resolution cannot be expected with nonsurgical treatment.  The condition is advanced far enough on the right that the patient is unlikely to have complete resolution of her symptoms and deficits, even with appropriate surgical intervention.    The cubital tunnel syndrome on the right is also quite severe.  As with the carpal tunnel syndrome, she probably cannot achieve complete resolution of her associated motor deficits with ulnar nerve transposition though the procedure would almost certainly result in some improvement.  The cubital tunnel syndrome on the left side is mild and perhaps does not require any specific attention at this point.    Findings were reviewed with the patient.    Reji Nolan MD        D: 2021   T: 2021   MT: PAKMT    Name:     JYA MALONE  MRN:      [id number]        Account:    [id number]   :      10/07/1934           Visit Date: 2021     Document: [id number]    cc:  Abiel Brambila MD

## 2021-09-16 NOTE — LETTER
9/16/2021         RE: Bernarda Ramírez  [id number] [place] [address]        Dear Colleague,    Thank you for referring your patient, Bernarda Ramírez, to the [facility] [place]. Please see a copy of my visit note below.    Visit Date: 09/16/2021    NEURODIAGNOSTICS CONSULTATION    REFERRING PHYSICIAN:  Abiel Brambila MD    HISTORY OF PRESENT ILLNESS:  The patient is a pleasant 86-year-old woman who is unfortunately a vague historian.  For an ill-defined length of time, she has had paresthesia and sensory loss in the right greater than left hand.  Remarkably, she does not seem to be aware of weakness in the right hand which on physical exam is actually profound.  She does not complain of neck or radicular quality pain.    PAST MEDICAL HISTORY:  Significant for hypertension and obesity, but negative for diabetes.    REVIEW OF SYSTEMS:  A 10-system review of 10-system is positive for low back pain and fairly diffuse arthralgia.    PHYSICAL EXAMINATION:  The patient is 5 feet tall.  Blood pressure 128/72 and she is afebrile.  She ambulates with difficulty and requires assistance.  There is severe weakness on the right for the interossei and the abductors of the thumb with mild weakness of these muscle groups on the left.  Proximal strength is symmetric.  Reflexes are hypoactive throughout.  Pinprick is reduced for all of the digits of the right hand.    NERVE CONDUCTION STUDIES:  Motor nerve conduction testing was performed bilaterally for the median and ulnar nerves.  There was very severe latency prolongation, conduction block and slowing at the wrist for the right median nerve with moderately severe latency prolongation and conduction block for the left.  There was very severe slowing and conduction block for the right ulnar nerve at the elbow with mild slowing for the left ulnar nerve at the elbow.  H reflexes were absent for both ulnar nerves, consistent with conduction block at the  elbow.    MONOPOLAR EMG NEEDLE EXAMINATION:  Monopolar EMG needle exam was performed bilaterally for the abductor pollicis brevis, first dorsal interosseous, triceps, brachioradialis and extensor pollicis brevis.  All the tested muscles showed normal insertional activity.  There were severe chronic denervation changes for the right abductor pollicis brevis and first dorsal interosseous with mild denervation changes for the left abductor pollicis brevis.    IMPRESSION:  The patient has bilateral carpal tunnel syndrome and bilateral cubital tunnel syndrome.  The carpal tunnel syndrome is very severe on the right and moderate grade on the left.  Even on the left, the degree of conduction block is great enough that resolution cannot be expected with nonsurgical treatment.  The condition is advanced far enough on the right that the patient is unlikely to have complete resolution of her symptoms and deficits, even with appropriate surgical intervention.    The cubital tunnel syndrome on the right is also quite severe.  As with the carpal tunnel syndrome, she probably cannot achieve complete resolution of her associated motor deficits with ulnar nerve transposition though the procedure would almost certainly result in some improvement.  The cubital tunnel syndrome on the left side is mild and perhaps does not require any specific attention at this point.    Findings were reviewed with the patient.    Reji Nolan MD        D: 2021   T: 2021   MT: PAKMT    Name:     JAY MALONE  MRN:      [id number]        Account:    [id number]   :      10/07/1934           Visit Date: 2021     Document: [id number]    cc:  Abiel Brambila MD       Again, thank you for allowing me to participate in the care of your patient.        Sincerely,        Reji Nolan MD

## 2021-09-22 DIAGNOSIS — G56.21 CUBITAL TUNNEL SYNDROME ON RIGHT: ICD-10-CM

## 2021-09-22 DIAGNOSIS — G56.03 BILATERAL CARPAL TUNNEL SYNDROME: Primary | ICD-10-CM

## 2021-09-24 ENCOUNTER — PATIENT OUTREACH (OUTPATIENT)
Dept: CARE COORDINATION | Facility: CLINIC | Age: 86
End: 2021-09-24

## 2021-09-24 NOTE — PROGRESS NOTES
Clinic Care Coordination Contact  [facility]/Voicemail       Clinical Data: Care Coordinator Outreach  Outreach attempted x 1.  Left message on patient's voicemail with call back information and requested return call.  Plan: Care Coordinator confirmed with [facility] [address] , that patient was in this morning and set up consult with Dr. Stevenson.  No further needs.  Mahi Minor RN on 9/24/2021 at 3:08 PM

## 2021-10-08 ENCOUNTER — PATIENT OUTREACH (OUTPATIENT)
Dept: CARE COORDINATION | Facility: CLINIC | Age: 86
End: 2021-10-08

## 2021-10-08 NOTE — PROGRESS NOTES
"Clinic Care Coordination Contact  Care Team Conversations    Called patient to check in and let her know that booster shots were available.  She had requested to be informed at a previous office visit.    Patient is in pain today with bilateral carpal tunnel.  She has been crying in sleep due to pain, can not  and lift anything right now. Her significant other is helping with cooking, etc. Suggested trying acetaminophen or ibuprofen, but she is not sure if she should.  Only Voltaren gel helps a little.  She plans to see PCP on October 12 to discuss this.  Was very concerned that she could not get in with Dr. Stevenson sooner than December, says \"I am not sure I can go that long like this\". Sounded teary.     Plan: Care coordinator coordinator will check that there is not a wait list in case there is cancellations with orthopedic surgeon.  Got her scheduled for booster shot when she is in the clinic on Tuesday.  No other needs at this time.  Mahi Minor RN on 10/8/2021 at 4:38 PM        "

## 2021-10-12 ENCOUNTER — OFFICE VISIT (OUTPATIENT)
Dept: FAMILY MEDICINE | Facility: OTHER | Age: 86
End: 2021-10-12
Attending: FAMILY MEDICINE
Payer: MEDICARE

## 2021-10-12 VITALS
DIASTOLIC BLOOD PRESSURE: 68 MMHG | HEIGHT: 60 IN | TEMPERATURE: 97.8 F | RESPIRATION RATE: 16 BRPM | SYSTOLIC BLOOD PRESSURE: 124 MMHG | OXYGEN SATURATION: 98 % | HEART RATE: 73 BPM | BODY MASS INDEX: 30.04 KG/M2 | WEIGHT: 153 LBS

## 2021-10-12 DIAGNOSIS — I10 ESSENTIAL HYPERTENSION: ICD-10-CM

## 2021-10-12 DIAGNOSIS — G56.21 CUBITAL TUNNEL SYNDROME ON RIGHT: ICD-10-CM

## 2021-10-12 DIAGNOSIS — G56.03 BILATERAL CARPAL TUNNEL SYNDROME: ICD-10-CM

## 2021-10-12 DIAGNOSIS — N18.32 STAGE 3B CHRONIC KIDNEY DISEASE (H): ICD-10-CM

## 2021-10-12 DIAGNOSIS — N30.00 ACUTE CYSTITIS WITHOUT HEMATURIA: Primary | ICD-10-CM

## 2021-10-12 PROBLEM — N18.30 CHRONIC KIDNEY DISEASE, STAGE 3 (H): Status: ACTIVE | Noted: 2021-10-12

## 2021-10-12 LAB
ALBUMIN UR-MCNC: NEGATIVE MG/DL
APPEARANCE UR: ABNORMAL
BACTERIA #/AREA URNS HPF: ABNORMAL /HPF
BILIRUB UR QL STRIP: NEGATIVE
COLOR UR AUTO: ABNORMAL
GLUCOSE UR STRIP-MCNC: NEGATIVE MG/DL
HGB UR QL STRIP: NEGATIVE
KETONES UR STRIP-MCNC: NEGATIVE MG/DL
LEUKOCYTE ESTERASE UR QL STRIP: ABNORMAL
MUCOUS THREADS #/AREA URNS LPF: PRESENT /LPF
NITRATE UR QL: POSITIVE
PH UR STRIP: 7 [PH] (ref 5–9)
RBC URINE: 1 /HPF
SP GR UR STRIP: 1.01 (ref 1–1.03)
SQUAMOUS EPITHELIAL: 3 /HPF
UROBILINOGEN UR STRIP-MCNC: NORMAL MG/DL
WBC URINE: 54 /HPF

## 2021-10-12 PROCEDURE — 81001 URINALYSIS AUTO W/SCOPE: CPT | Mod: ZL | Performed by: FAMILY MEDICINE

## 2021-10-12 PROCEDURE — 87086 URINE CULTURE/COLONY COUNT: CPT | Mod: ZL | Performed by: FAMILY MEDICINE

## 2021-10-12 PROCEDURE — 90662 IIV NO PRSV INCREASED AG IM: CPT

## 2021-10-12 PROCEDURE — G0463 HOSPITAL OUTPT CLINIC VISIT: HCPCS

## 2021-10-12 PROCEDURE — 91300 PR COVID VAC PFIZER DIL RECON 30 MCG/0.3 ML IM: CPT

## 2021-10-12 PROCEDURE — 99213 OFFICE O/P EST LOW 20 MIN: CPT | Performed by: FAMILY MEDICINE

## 2021-10-12 PROCEDURE — G0463 HOSPITAL OUTPT CLINIC VISIT: HCPCS | Mod: 25

## 2021-10-12 PROCEDURE — G0008 ADMIN INFLUENZA VIRUS VAC: HCPCS

## 2021-10-12 RX ORDER — DEXAMETHASONE 4 MG/1
TABLET ORAL
Qty: 12 TABLET | Refills: 0 | Status: SHIPPED | OUTPATIENT
Start: 2021-10-12 | End: 2021-12-07

## 2021-10-12 ASSESSMENT — PAIN SCALES - GENERAL: PAINLEVEL: MILD PAIN (3)

## 2021-10-12 ASSESSMENT — MIFFLIN-ST. JEOR: SCORE: 1050.5

## 2021-10-12 NOTE — PROGRESS NOTES
Nursing Notes:   Aracelis Fish LPN  10/12/2021  2:15 PM  Signed  Chief Complaint   Patient presents with     Vaginal Problem     vaginal buring off and on for months   She has had vaginal burning off and on for months. She says she wears a pad for incontinence and thinks maybe that is irritating her.  Aracelis Fish LPN..................10/12/2021   1:59 PM      Initial /68   Pulse 73   Temp 97.8  F (36.6  C) (Temporal)   Resp 16   Ht 1.524 m (5')   Wt 69.4 kg (153 lb)   LMP  (LMP Unknown)   SpO2 98%   Breastfeeding No   BMI 29.88 kg/m   Estimated body mass index is 29.88 kg/m  as calculated from the following:    Height as of this encounter: 1.524 m (5').    Weight as of this encounter: 69.4 kg (153 lb).  Medication Reconciliation: complete    FOOD SECURITY SCREENING QUESTIONS  Hunger Vital Signs:  Within the past 12 months we worried whether our food would run out before we got money to buy more. Never  Within the past 12 months the food we bought just didn't last and we didn't have money to get more. Never    Aracelis Fish LPN      SUBJECTIVE:  Bernarda Ramírez  is a 87 year old female who comes in today because of a possible bladder infection. She has no dysuria, but it burns after she wipes. She has tried to drink more water. She has to void every 2 hours. s She has stage IIIb chronic kidney disease.  She is on lisinopril for hypertension.    She has had trouble with carpal tunnel.  When I saw her back in August, we referred her for EMG.  She has severe carpal tunnel on the right and moderate grade on the left.  Even on the left the degree of conduction block is great enough that resolution cannot be expected with nonsurgical treatment.  On the right, it was felt that she would be unlikely to have complete resolution of her symptoms and deficits even with appropriate surgical intervention.  She also has quite severe cubital tunnel syndrome on the right and probably needs surgical  intervention for that. She has an appointment with Dr. Stevenson, but not until December 3 here.     Past Medical, Family, and Social History reviewed and updated as noted below.   ROS is negative except as noted above       Allergies   Allergen Reactions     Sulfa Drugs Unknown   , No family history on file.,   Current Outpatient Medications   Medication     acetaminophen (TYLENOL) 650 MG CR tablet     aspirin (ASA) 81 MG EC tablet     atorvastatin (LIPITOR) 20 MG tablet     dexamethasone (DECADRON) 4 MG tablet     hydrochlorothiazide (HYDRODIURIL) 12.5 MG tablet     lisinopril (ZESTRIL) 20 MG tablet     Lutein 6 MG TABS     vitamin D3 (CHOLECALCIFEROL) 50 mcg (2000 units) tablet     No current facility-administered medications for this visit.   , No past medical history on file.,   Patient Active Problem List    Diagnosis Date Noted     Chronic kidney disease, stage 3 10/12/2021     Priority: Medium     Syncope 07/28/2020     Priority: Medium     UTI (urinary tract infection) 07/28/2020     Priority: Medium     Stenosis of left vertebral artery 07/28/2020     Priority: Medium     Hypercalcemia 02/22/2019     Priority: Medium     Vitamin D deficiency 02/22/2010     Priority: Medium     Dyspepsia and disorder of function of stomach 11/10/2003     Priority: Medium     Acid peptic disease  IMO Update 10/11       Breast neoplasm 09/05/2003     Priority: Medium     Ill-defined nodular density in upper outer aspect of right breast on U/S and mammogram today; steriotactic BX scheduled in [place]  IMO Update 10/11       Convulsions (H) 11/18/2002     Priority: Medium     Seizure disorder, Petit small seizures, curently stable  IMO Update 10/11       Essential hypertension 11/18/2002     Priority: Medium     IMO Update       Bundle branch block 07/22/2002     Priority: Medium     Noted on EKG 06/17/03  IMO Update 10/11     ,   Past Surgical History:   Procedure Laterality Date     APPENDECTOMY       AS ESOPHAGOSCOPY,  DIAGNOSTIC  03/06/2000     bilateral cataract  2009     BREAST BIOPSY, CORE RT/LT  09/10/2003    Range     COLONOSCOPY  07/30/2003    normal     ECTOPIC PREGNANCY SURGERY       EGD with dilation  09/14/2005     LAPAROSCOPIC CHOLECYSTECTOMY  01/1994     Left foot spur surgery, 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 Weeks at [facility]     RELEASE TRIGGER FINGER      right thumb    and   Social History     Tobacco Use     Smoking status: Never Smoker     Smokeless tobacco: Never Used   Substance Use Topics     Alcohol use: Not Currently     OBJECTIVE:  /68   Pulse 73   Temp 97.8  F (36.6  C) (Temporal)   Resp 16   Ht 1.524 m (5')   Wt 69.4 kg (153 lb)   LMP  (LMP Unknown)   SpO2 98%   Breastfeeding No   BMI 29.88 kg/m     EXAM:  Alert cooperative, no distress.  She is no CVA tenderness and I think she probably just has some irritation from her pad.  Markedly positive Tinel's and Phalen signs bilaterally right greater than left.    Results for orders placed or performed in visit on 10/12/21   UA reflex to Microscopic and Culture     Status: Abnormal    Specimen: Urine, Midstream   Result Value Ref Range    Color Urine Light Yellow Colorless, Straw, Light Yellow, Yellow    Appearance Urine Slightly Cloudy (A) Clear    Glucose Urine Negative Negative mg/dL    Bilirubin Urine Negative Negative    Ketones Urine Negative Negative mg/dL    Specific Gravity Urine 1.014 1.000 - 1.030    Blood Urine Negative Negative    pH Urine 7.0 5.0 - 9.0    Protein Albumin Urine Negative Negative mg/dL    Urobilinogen Urine Normal Normal, 2.0 mg/dL    Nitrite Urine Positive (A) Negative    Leukocyte Esterase Urine Large (A) Negative    Bacteria Urine Many (A) None Seen /HPF    Mucus Urine Present (A) None Seen /LPF    RBC Urine 1 <=2 /HPF    WBC Urine 54 (H) <=5 /HPF    Squamous Epithelials Urine 3 (H) <=1 /HPF    Narrative    Urine Culture ordered based on laboratory criteria      ASSESSMENT/Plan :    Bernarda was seen today for  vaginal problem.    Diagnoses and all orders for this visit:    Acute cystitis without hematuria  -     Cancel: UA reflex to Microscopic and Culture; Future  -     UA reflex to Microscopic and Culture  -     Urine Culture    Stage 3b chronic kidney disease (H)    Essential hypertension    Bilateral carpal tunnel syndrome  -     dexamethasone (DECADRON) 4 MG tablet; One tablet twice daily for 4 days, then once daily for 4 days, then stop.  Take all medication with food.  -     WRIST SPLINT    Cubital tunnel syndrome on right  -     dexamethasone (DECADRON) 4 MG tablet; One tablet twice daily for 4 days, then once daily for 4 days, then stop.  Take all medication with food.      I am not convinced that she has a UTI but may does have some irritation of her from her pad.  She is going to try different pad.  If her culture is positive and she still has symptoms would then treat but will hold off for now.  She will continue to push fluids.    We talked to orthopedics to see if we can get her moved up on Dr. Stevenson's schedule as she has such severe symptoms.  We also got her some bilateral wrist splints and gave her a little burst of dexamethasone to see if that would help with her symptoms to get her by until her consultation.    Abiel Brambila MD

## 2021-10-12 NOTE — NURSING NOTE
Chief Complaint   Patient presents with     Vaginal Problem     vaginal buring off and on for months   She has had vaginal burning off and on for months. She says she wears a pad for incontinence and thinks maybe that is irritating her.  Aracelis Fish LPN..................10/12/2021   1:59 PM      Initial /68   Pulse 73   Temp 97.8  F (36.6  C) (Temporal)   Resp 16   Ht 1.524 m (5')   Wt 69.4 kg (153 lb)   LMP  (LMP Unknown)   SpO2 98%   Breastfeeding No   BMI 29.88 kg/m   Estimated body mass index is 29.88 kg/m  as calculated from the following:    Height as of this encounter: 1.524 m (5').    Weight as of this encounter: 69.4 kg (153 lb).  Medication Reconciliation: complete    FOOD SECURITY SCREENING QUESTIONS  Hunger Vital Signs:  Within the past 12 months we worried whether our food would run out before we got money to buy more. Never  Within the past 12 months the food we bought just didn't last and we didn't have money to get more. Never    Aracelis Fish LPN

## 2021-10-14 DIAGNOSIS — B96.20 E. COLI UTI: Primary | ICD-10-CM

## 2021-10-14 DIAGNOSIS — N39.0 E. COLI UTI: Primary | ICD-10-CM

## 2021-10-14 LAB — BACTERIA UR CULT: ABNORMAL

## 2021-10-14 RX ORDER — AMOXICILLIN 875 MG
875 TABLET ORAL 2 TIMES DAILY
Qty: 14 TABLET | Refills: 0 | Status: SHIPPED | OUTPATIENT
Start: 2021-10-14 | End: 2021-10-21

## 2021-10-28 DIAGNOSIS — G56.03 BILATERAL CARPAL TUNNEL SYNDROME: Primary | ICD-10-CM

## 2021-10-29 ENCOUNTER — OFFICE VISIT (OUTPATIENT)
Dept: ORTHOPEDICS | Facility: OTHER | Age: 86
End: 2021-10-29
Attending: FAMILY MEDICINE
Payer: MEDICARE

## 2021-10-29 ENCOUNTER — HOSPITAL ENCOUNTER (OUTPATIENT)
Dept: GENERAL RADIOLOGY | Facility: OTHER | Age: 86
End: 2021-10-29
Attending: SPECIALIST
Payer: MEDICARE

## 2021-10-29 DIAGNOSIS — G56.03 BILATERAL CARPAL TUNNEL SYNDROME: ICD-10-CM

## 2021-10-29 DIAGNOSIS — G56.21 CUBITAL TUNNEL SYNDROME ON RIGHT: ICD-10-CM

## 2021-10-29 PROCEDURE — 99214 OFFICE O/P EST MOD 30 MIN: CPT | Performed by: SPECIALIST

## 2021-10-29 PROCEDURE — 73110 X-RAY EXAM OF WRIST: CPT | Mod: LT

## 2021-10-29 PROCEDURE — G0463 HOSPITAL OUTPT CLINIC VISIT: HCPCS

## 2021-10-29 NOTE — PROGRESS NOTES
Patient is here for consult on her carpal and cubital tunnel.   Lucille Cifuentes LPN .....................10/29/2021 3:18 PM

## 2021-10-30 NOTE — PROGRESS NOTES
Visit Date: 10/29/2021    HISTORY OF PRESENT ILLNESS:  Bernarda is an 87-year-old right hand dominant female whom I am seeing today for bilateral upper extremity complaints.  The patient reports she has been having difficulty with both hands for some time.  She is not sure how long this has been going on, but has significant weakness in both hands.  Numbness is similar.  She underwent EMG studies and was referred.    PAST MEDICAL HISTORY, PAST SURGICAL HISTORY, MEDICATIONS AND ALLERGIES:  Reviewed.    PHYSICAL EXAMINATION:  Shows an 87-year-old female.  She is alert and oriented x 3, and appropriate.  Gait and station are appropriate.  She is well groomed and well kempt.  Examination of both upper extremities reveals full range of motion of elbows and wrists.  The patient shows weakness of the median and ulnar nerve intrinsics of the hand, right greater than left.  Her digital range of motion is well preserved.    IMAGING:  Plain film radiographs were reviewed, including AP and lateral views of both hands and wrists obtained in the office today.  These show degenerative changes of the STT joint.  This was noted bilaterally.  EMG studies reviewed performed by Dr. Nolan, dated 09/16/2021.  The EMG studies show bilateral carpal tunnel syndrome.  This is a very severe on the right and moderate on the left.  In addition to this, she has severe cubital tunnel on the right, mild cubital tunnel on the left.    IMPRESSION AND PLAN:  Bilateral carpal tunnel syndrome.  We have recommended starting on the right side and proceeding with right endoscopic carpal tunnel release and right-sided subcutaneous ulnar nerve transposition versus decompression.  On the left side, carpal tunnel release only would be required.  We discussed this at length.  Risks, complications, and benefits were reviewed, and this can be scheduled at her convenience.    Bandar Stevenson MD        D: 10/29/2021   T: 10/29/2021   MT: HALEY    Name:     KIRA  JAY ALAS  MRN:      [id number]        Account:    [id number]   :      10/07/1934           Visit Date: 10/29/2021     Document: [id number]

## 2021-11-08 ENCOUNTER — ALLIED HEALTH/NURSE VISIT (OUTPATIENT)
Dept: FAMILY MEDICINE | Facility: OTHER | Age: 86
End: 2021-11-08
Attending: PHYSICIAN ASSISTANT
Payer: MEDICARE

## 2021-11-08 DIAGNOSIS — Z20.822 COVID-19 RULED OUT: Primary | ICD-10-CM

## 2021-11-08 PROCEDURE — C9803 HOPD COVID-19 SPEC COLLECT: HCPCS

## 2021-11-08 PROCEDURE — U0003 INFECTIOUS AGENT DETECTION BY NUCLEIC ACID (DNA OR RNA); SEVERE ACUTE RESPIRATORY SYNDROME CORONAVIRUS 2 (SARS-COV-2) (CORONAVIRUS DISEASE [COVID-19]), AMPLIFIED PROBE TECHNIQUE, MAKING USE OF HIGH THROUGHPUT TECHNOLOGIES AS DESCRIBED BY CMS-2020-01-R: HCPCS | Mod: ZL

## 2021-11-09 LAB — SARS-COV-2 RNA RESP QL NAA+PROBE: NEGATIVE

## 2021-11-11 ENCOUNTER — ANESTHESIA EVENT (OUTPATIENT)
Dept: SURGERY | Facility: OTHER | Age: 86
End: 2021-11-11
Payer: MEDICARE

## 2021-11-12 ENCOUNTER — HOSPITAL ENCOUNTER (OUTPATIENT)
Facility: OTHER | Age: 86
Discharge: HOME | End: 2021-11-12
Attending: SPECIALIST | Admitting: SPECIALIST
Payer: MEDICARE

## 2021-11-12 ENCOUNTER — ANESTHESIA (OUTPATIENT)
Dept: SURGERY | Facility: OTHER | Age: 86
End: 2021-11-12
Payer: MEDICARE

## 2021-11-12 VITALS
DIASTOLIC BLOOD PRESSURE: 56 MMHG | OXYGEN SATURATION: 97 % | HEIGHT: 60 IN | BODY MASS INDEX: 30.04 KG/M2 | HEART RATE: 60 BPM | WEIGHT: 153 LBS | SYSTOLIC BLOOD PRESSURE: 125 MMHG | TEMPERATURE: 97.6 F | RESPIRATION RATE: 16 BRPM

## 2021-11-12 DIAGNOSIS — G56.21 CUBITAL TUNNEL SYNDROME ON RIGHT: Primary | ICD-10-CM

## 2021-11-12 PROCEDURE — 64415 NJX AA&/STRD BRCH PLXS IMG: CPT | Mod: RT | Performed by: NURSE ANESTHETIST, CERTIFIED REGISTERED

## 2021-11-12 PROCEDURE — 250N000009 HC RX 250: Performed by: SPECIALIST

## 2021-11-12 PROCEDURE — 258N000003 HC RX IP 258 OP 636: Performed by: NURSE ANESTHETIST, CERTIFIED REGISTERED

## 2021-11-12 PROCEDURE — 64718 REVISE ULNAR NERVE AT ELBOW: CPT | Mod: RT | Performed by: SPECIALIST

## 2021-11-12 PROCEDURE — 76942 ECHO GUIDE FOR BIOPSY: CPT | Mod: 26 | Performed by: NURSE ANESTHETIST, CERTIFIED REGISTERED

## 2021-11-12 PROCEDURE — 250N000011 HC RX IP 250 OP 636: Performed by: SPECIALIST

## 2021-11-12 PROCEDURE — 272N000001 HC OR GENERAL SUPPLY STERILE: Performed by: SPECIALIST

## 2021-11-12 PROCEDURE — 710N000012 HC RECOVERY PHASE 2, PER MINUTE: Performed by: SPECIALIST

## 2021-11-12 PROCEDURE — 360N000076 HC SURGERY LEVEL 3, PER MIN: Performed by: SPECIALIST

## 2021-11-12 PROCEDURE — 250N000011 HC RX IP 250 OP 636: Performed by: NURSE ANESTHETIST, CERTIFIED REGISTERED

## 2021-11-12 PROCEDURE — 370N000017 HC ANESTHESIA TECHNICAL FEE, PER MIN: Performed by: SPECIALIST

## 2021-11-12 PROCEDURE — 29848 WRIST ENDOSCOPY/SURGERY: CPT | Mod: RT | Performed by: SPECIALIST

## 2021-11-12 PROCEDURE — 250N000009 HC RX 250: Performed by: NURSE ANESTHETIST, CERTIFIED REGISTERED

## 2021-11-12 PROCEDURE — 999N000141 HC STATISTIC PRE-PROCEDURE NURSING ASSESSMENT: Performed by: SPECIALIST

## 2021-11-12 RX ORDER — DEXMEDETOMIDINE HYDROCHLORIDE 4 UG/ML
INJECTION, SOLUTION INTRAVENOUS PRN
Status: DISCONTINUED | OUTPATIENT
Start: 2021-11-12 | End: 2021-11-12

## 2021-11-12 RX ORDER — MAGNESIUM HYDROXIDE 1200 MG/15ML
LIQUID ORAL PRN
Status: DISCONTINUED | OUTPATIENT
Start: 2021-11-12 | End: 2021-11-12 | Stop reason: HOSPADM

## 2021-11-12 RX ORDER — SODIUM CHLORIDE, SODIUM LACTATE, POTASSIUM CHLORIDE, CALCIUM CHLORIDE 600; 310; 30; 20 MG/100ML; MG/100ML; MG/100ML; MG/100ML
INJECTION, SOLUTION INTRAVENOUS CONTINUOUS
Status: DISCONTINUED | OUTPATIENT
Start: 2021-11-12 | End: 2021-11-12 | Stop reason: HOSPADM

## 2021-11-12 RX ORDER — DEXAMETHASONE SODIUM PHOSPHATE 10 MG/ML
INJECTION, SOLUTION INTRAMUSCULAR; INTRAVENOUS PRN
Status: DISCONTINUED | OUTPATIENT
Start: 2021-11-12 | End: 2021-11-12

## 2021-11-12 RX ORDER — SODIUM CHLORIDE, SODIUM LACTATE, POTASSIUM CHLORIDE, CALCIUM CHLORIDE 600; 310; 30; 20 MG/100ML; MG/100ML; MG/100ML; MG/100ML
INJECTION, SOLUTION INTRAVENOUS CONTINUOUS PRN
Status: DISCONTINUED | OUTPATIENT
Start: 2021-11-12 | End: 2021-11-12

## 2021-11-12 RX ORDER — PROPOFOL 10 MG/ML
INJECTION, EMULSION INTRAVENOUS CONTINUOUS PRN
Status: DISCONTINUED | OUTPATIENT
Start: 2021-11-12 | End: 2021-11-12

## 2021-11-12 RX ORDER — FENTANYL CITRATE 50 UG/ML
25 INJECTION, SOLUTION INTRAMUSCULAR; INTRAVENOUS EVERY 5 MIN PRN
Status: DISCONTINUED | OUTPATIENT
Start: 2021-11-12 | End: 2021-11-12 | Stop reason: HOSPADM

## 2021-11-12 RX ORDER — HYDROCODONE BITARTRATE AND ACETAMINOPHEN 5; 325 MG/1; MG/1
1-2 TABLET ORAL EVERY 4 HOURS PRN
Qty: 10 TABLET | Refills: 0 | Status: SHIPPED | OUTPATIENT
Start: 2021-11-12 | End: 2021-12-07

## 2021-11-12 RX ORDER — ONDANSETRON 4 MG/1
4 TABLET, ORALLY DISINTEGRATING ORAL EVERY 30 MIN PRN
Status: DISCONTINUED | OUTPATIENT
Start: 2021-11-12 | End: 2021-11-12 | Stop reason: HOSPADM

## 2021-11-12 RX ORDER — BUPIVACAINE HYDROCHLORIDE 5 MG/ML
INJECTION, SOLUTION EPIDURAL; INTRACAUDAL PRN
Status: DISCONTINUED | OUTPATIENT
Start: 2021-11-12 | End: 2021-11-12

## 2021-11-12 RX ORDER — CEFAZOLIN SODIUM 2 G/100ML
2 INJECTION, SOLUTION INTRAVENOUS ONCE
Status: COMPLETED | OUTPATIENT
Start: 2021-11-12 | End: 2021-11-12

## 2021-11-12 RX ORDER — LIDOCAINE 40 MG/G
CREAM TOPICAL
Status: DISCONTINUED | OUTPATIENT
Start: 2021-11-12 | End: 2021-11-12 | Stop reason: HOSPADM

## 2021-11-12 RX ORDER — ONDANSETRON 2 MG/ML
4 INJECTION INTRAMUSCULAR; INTRAVENOUS EVERY 30 MIN PRN
Status: DISCONTINUED | OUTPATIENT
Start: 2021-11-12 | End: 2021-11-12 | Stop reason: HOSPADM

## 2021-11-12 RX ADMIN — CEFAZOLIN SODIUM 2 G: 2 INJECTION, SOLUTION INTRAVENOUS at 09:03

## 2021-11-12 RX ADMIN — SODIUM CHLORIDE, POTASSIUM CHLORIDE, SODIUM LACTATE AND CALCIUM CHLORIDE 100 ML/HR: 600; 310; 30; 20 INJECTION, SOLUTION INTRAVENOUS at 07:31

## 2021-11-12 RX ADMIN — SODIUM CHLORIDE 25 MCG: 900 INJECTION, SOLUTION INTRAVENOUS at 08:00

## 2021-11-12 RX ADMIN — SODIUM CHLORIDE, SODIUM LACTATE, POTASSIUM CHLORIDE, AND CALCIUM CHLORIDE: 600; 310; 30; 20 INJECTION, SOLUTION INTRAVENOUS at 08:58

## 2021-11-12 RX ADMIN — DEXAMETHASONE SODIUM PHOSPHATE 2 MG: 10 INJECTION, SOLUTION INTRAMUSCULAR; INTRAVENOUS at 08:00

## 2021-11-12 RX ADMIN — PROPOFOL 70 MCG/KG/MIN: 10 INJECTION, EMULSION INTRAVENOUS at 09:00

## 2021-11-12 RX ADMIN — LIDOCAINE HYDROCHLORIDE 0.1 ML: 10 INJECTION, SOLUTION EPIDURAL; INFILTRATION; INTRACAUDAL; PERINEURAL at 07:32

## 2021-11-12 RX ADMIN — MIDAZOLAM 1 MG: 1 INJECTION INTRAMUSCULAR; INTRAVENOUS at 07:54

## 2021-11-12 RX ADMIN — BUPIVACAINE HYDROCHLORIDE 30 ML: 5 INJECTION, SOLUTION EPIDURAL; INTRACAUDAL; PERINEURAL at 08:00

## 2021-11-12 ASSESSMENT — MIFFLIN-ST. JEOR: SCORE: 1050.5

## 2021-11-12 NOTE — BRIEF OP NOTE
[facility]    Brief Operative Note    Pre-operative diagnosis: Carpal tunnel syndrome [G56.00]  Post-operative diagnosis Same as pre-operative diagnosis    Procedure: Procedure(s):  RELEASE, CARPAL TUNNEL, ENDOSCOPIC  TRANSPOSITION, NERVE, ULNAR  Surgeon: Surgeon(s) and Role:     * Bandar Stevenson MD - Primary     * Lc Calix PA - Assisting  Anesthesia: MAC with Block   Estimated Blood Loss: None    Drains: None  Specimens: * No specimens in log *  Findings:   None.  Complications: None.  Implants: * No implants in log *

## 2021-11-12 NOTE — H&P (VIEW-ONLY)
[facility] History and Physical    Bernarda Ramírez MRN# [id number]   Age: 87 year old YOB: 1934     Date of Admission:  11/12/2021    [facility]  Primary care provider: Abiel Brambila          Assessment and Plan:   Assessment:   Cubital and carpal tunnel right      Plan:   Right endoscopic carpal tunnel and and right ulnar nerve decompression vs transposition               Chief Complaint:   Carpal tunnel     History is obtained from the patient         History of Present Illness:   This patient is a 87 year old female who presents with the following condition requiring a hospital admission:    Carpal Tunnel Syndrome  Patient complains of right wrist and hand pain. This is evaluated as a personal injury. The onset of the pain was gradual onset The pain is described as numbing.             Past Medical History:   History reviewed. No pertinent past medical history.         Past Surgical History:   I have reviewed this patient's past surgical history and commented on sigificant events within the HPI         Social History:   I have reviewed this patient's social history         Family History:   This patient has no significant family history         Immunizations:   na         Allergies:   All allergies reviewed and addressed         Medications:     Current Facility-Administered Medications   Medication     ceFAZolin (ANCEF) intermittent infusion 2 g in 100 mL dextrose PRE-MIX     lactated ringers infusion     lidocaine (LMX4) cream     lidocaine 1 % 0.1-1 mL     sodium chloride (PF) 0.9% PF flush 3 mL     sodium chloride (PF) 0.9% PF flush 3 mL     Facility-Administered Medications Ordered in Other Encounters   Medication     bupivacaine 0.5% PF     dexamethasone PF (DECADRON) injection     dexmedetomidine (PRECEDEX) bolus 200 mcg             Review of Systems:   CONSTITUTIONAL: NEGATIVE for fever, chills, change in weight  ENT/MOUTH: NEGATIVE for ear, mouth and throat  problems  RESP: NEGATIVE for significant cough or SOB  CV: NEGATIVE for chest pain, palpitations or peripheral edema         Physical Exam:   Temp: 99.1  F (37.3  C) Temp src: Temporal BP: (!) 140/70 Pulse: 69   Resp: 15 SpO2: 100 % O2 Device: Nasal cannula Oxygen Delivery: 2 LPM  All vitals have been reviewed           Data:          Attestation:  I have reviewed today's vital signs, notes, medications, labs and imaging.    Bandar Stevenson MD

## 2021-11-12 NOTE — DISCHARGE INSTRUCTIONS
Surgeon Contact Information  Orthopedic Physical Therapy/Occupational Therapy Order faxed to patients preferred therapist *** and copy sent home with patient for reference.  If you have questions or concerns related to your procedure please contact your surgeon through [facility] at [phone].    [facility]  Adult Discharge Orders & Instructions      For 24 hours after surgery:  1. Get plenty of rest.  A responsible adult must stay with you for at least 24 hours after you leave the hospital.   2. You may feel lightheaded.  IF so, sit for a few minutes before standing.  Have someone help you get up.   3. You may have a slight fever. Call the doctor if your fever is over 101 F (38.3 C) (taken under the tongue) or lasts longer than 24 hours.  4. You may have a dry mouth, a sore throat, muscle aches or trouble sleeping.  These should go away after 24 hours.  5. Do not make important or legal decisions.  6.   Do not drive or use heavy equipment.  If you have weakness or tingling, don't drive or use heavy equipment until this feeling goes away.                                                                                                                                                                         To contact a doctor, call    [phone]______________

## 2021-11-12 NOTE — ANESTHESIA PREPROCEDURE EVALUATION
Anesthesia Pre-Procedure Evaluation    Patient: Bernarda Ramírez   MRN: [id number] : 10/7/1934        Preoperative Diagnosis: Carpal tunnel syndrome [G56.00]    Procedure : Procedure(s):  RELEASE, CARPAL TUNNEL, ENDOSCOPIC  TRANSPOSITION, NERVE, ULNAR          History reviewed. No pertinent past medical history.   Past Surgical History:   Procedure Laterality Date     APPENDECTOMY       AS ESOPHAGOSCOPY, DIAGNOSTIC  2000     bilateral cataract  2009     BREAST BIOPSY, CORE RT/LT  09/10/2003    [facility]     COLONOSCOPY  2003    normal     ECTOPIC PREGNANCY SURGERY       EGD with dilation  2005     LAPAROSCOPIC CHOLECYSTECTOMY  1994     Left foot spur surgery, Bora's deformity  2003    Dr. Weeks at [facility]     RELEASE TRIGGER FINGER      right thumb      Allergies   Allergen Reactions     Sulfa Drugs Unknown      Social History     Tobacco Use     Smoking status: Never Smoker     Smokeless tobacco: Never Used   Substance Use Topics     Alcohol use: Not Currently      Wt Readings from Last 1 Encounters:   21 69.4 kg (153 lb)        Anesthesia Evaluation   Pt has had prior anesthetic.     No history of anesthetic complications       ROS/MED HX  ENT/Pulmonary:  - neg pulmonary ROS     Neurologic: Comment: Last seizure was in 4      Cardiovascular:     (+) hypertension-----fainting (syncope).     METS/Exercise Tolerance: 3 - Able to walk 1-2 blocks without stopping    Hematologic:  - neg hematologic  ROS     Musculoskeletal:  - neg musculoskeletal ROS     GI/Hepatic:     (+) GERD,     Renal/Genitourinary:     (+) renal disease,     Endo:  - neg endo ROS     Psychiatric/Substance Use:  - neg psychiatric ROS     Infectious Disease:  - neg infectious disease ROS     Malignancy:  - neg malignancy ROS     Other:  - neg other ROS          Physical Exam    Airway        Mallampati: II   TM distance: > 3 FB   Neck ROM: full   Mouth opening: > 3 cm    Respiratory Devices and Support          Dental       (+) upper dentures and partials      Cardiovascular   cardiovascular exam normal       Rhythm and rate: regular and normal     Pulmonary   pulmonary exam normal        breath sounds clear to auscultation           OUTSIDE LABS:  CBC:   Lab Results   Component Value Date    WBC 5.6 09/07/2021    WBC 4.9 08/20/2021    HGB 13.8 09/07/2021    HGB 13.4 08/20/2021    HCT 42.3 09/07/2021    HCT 40.8 08/20/2021     09/07/2021     08/20/2021     BMP:   Lab Results   Component Value Date     09/07/2021     08/20/2021    POTASSIUM 5.2 (H) 09/07/2021    POTASSIUM 4.4 08/20/2021    CHLORIDE 101 09/07/2021    CHLORIDE 103 08/20/2021    CO2 29 09/07/2021    CO2 26 08/20/2021    BUN 27 (H) 09/07/2021    BUN 27 (H) 08/20/2021    CR 1.53 (H) 09/07/2021    CR 1.36 (H) 08/20/2021     09/07/2021     (H) 08/20/2021     COAGS:   Lab Results   Component Value Date    PTT <20 (L) 07/28/2020    INR 0.97 07/28/2020     POC: No results found for: BGM, HCG, HCGS  HEPATIC:   Lab Results   Component Value Date    ALBUMIN 4.0 08/19/2021    PROTTOTAL 6.2 (L) 08/19/2021    ALT 11 08/19/2021    AST 13 08/19/2021    ALKPHOS 52 08/19/2021    BILITOTAL 0.4 08/19/2021     OTHER:   Lab Results   Component Value Date    LACT 1.3 07/28/2020    VARGHESE 11.3 (H) 09/07/2021    MAG 2.0 07/28/2020       Anesthesia Plan    ASA Status:  2   NPO Status:  NPO Appropriate    Anesthesia Type: MAC.     - Reason for MAC: straight local not clinically adequate   Induction: Intravenous.   Maintenance: Balanced.        Consents    Anesthesia Plan(s) and associated risks, benefits, and realistic alternatives discussed. Questions answered and patient/representative(s) expressed understanding.     - Discussed with:  Patient      - Extended Intubation/Ventilatory Support Discussed: No.      - Patient is DNR/DNI Status: No    Use of blood products discussed: No .     Postoperative Care    Pain management: IV analgesics,  Multi-modal analgesia, Peripheral nerve block (Single Shot).   PONV prophylaxis: Ondansetron (or other 5HT-3)     Comments:    Risks, benefits and alternatives discussed and would like to proceed with infraclavicular nerve block and MAC for procedure. General anesthesia ok if indicated.               KHUSHBOO Mena CRNA

## 2021-11-12 NOTE — ANESTHESIA PROCEDURE NOTES
Infraclavicular Procedure Note    Pre-Procedure   Staff -        CRNA: Laron Fletcher APRN CRNA       Performed By: CRNA       Location: pre-op       Procedure Start/Stop Times: 11/12/2021 7:54 AM and 11/12/2021 8:02 AM       Pre-Anesthestic Checklist: patient identified, IV checked, site marked, risks and benefits discussed, informed consent, monitors and equipment checked, pre-op evaluation, at physician/surgeon's request and post-op pain management  Timeout:       Correct Patient: Yes        Correct Procedure: Yes        Correct Site: Yes        Correct Position: Yes        Correct Laterality: Yes        Site Marked: Yes  Procedure Documentation  Procedure: Infraclavicular       Diagnosis: ELBOW PAIN       Laterality: right       Patient Position: supine       Patient Prep/Sterile Barriers: sterile gloves, mask       Skin prep: Chloraprep       Needle Type: short bevel       Needle Gauge: 22.        Needle Length (Inches): 3.13        Ultrasound guided       1. Ultrasound was used to identify targeted nerve, plexus, vascular marker, or fascial plane and place a needle adjacent to it in real-time.       2. Ultrasound was used to visualize the spread of anesthetic in close proximity to the above referenced structure.       3. A permanent image is entered into the patient's record.       4. The visualized anatomic structures appeared normal.       5. There were no apparent abnormal pathologic findings.    Assessment/Narrative         The placement was negative for: blood aspirated, painful injection and site bleeding       Paresthesias: No.      Bolus given via needle. No blood aspirated via catheter.        Secured via.        Insertion/Infusion Method: Single Shot       Complications: none

## 2021-11-12 NOTE — ANESTHESIA CARE TRANSFER NOTE
Patient: Bernarda Ramírez    Procedure: Procedure(s):  RELEASE, CARPAL TUNNEL, ENDOSCOPIC  TRANSPOSITION, NERVE, ULNAR       Diagnosis: Carpal tunnel syndrome [G56.00]  Diagnosis Additional Information: No value filed.    Anesthesia Type:   MAC     Note:      Level of Consciousness: drowsy  Oxygen Supplementation: face mask  Level of Supplemental Oxygen (L/min / FiO2): 8 L/min  Independent Airway: airway patency satisfactory and stable  Dentition: dentition unchanged  Vital Signs Stable: post-procedure vital signs reviewed and stable  Report to RN Given: handoff report given  Patient transferred to: Phase II    Handoff Report: Identifed the Patient, Identified the Reponsible Provider, Reviewed the pertinent medical history, Discussed the surgical course, Reviewed Intra-OP anesthesia mangement and issues during anesthesia, Set expectations for post-procedure period and Allowed opportunity for questions and acknowledgement of understanding      Vitals:  Vitals Value Taken Time   /64 11/12/21 1004   Temp     Pulse 68 11/12/21 1004   Resp     SpO2 97 % 11/12/21 1005   Vitals shown include unvalidated device data.    Electronically Signed By: David Kellerman, APRN CRNA  November 12, 2021  10:06 AM

## 2021-11-12 NOTE — OP NOTE
Procedure Date: 11/12/2021    PREOPERATIVE DIAGNOSIS:  Right cubital and carpal tunnel syndrome.    POSTOPERATIVE DIAGNOSIS:  Right cubital and carpal tunnel syndrome.    PROCEDURES PERFORMED:    1.  Right endoscopic carpal tunnel release.  2.  Right subcutaneous ulnar nerve transposition.    SURGEON:  Bandar Stevenson MD    ASSISTANT:  Lc Calix PA-C    ANESTHESIA:  General plus block.    INDICATIONS FOR PROCEDURE:  An 87-year-old female with severe cubital and carpal tunnel syndrome.  She failed nonoperative treatment and was offered surgical treatment as outlined above.  Risks, complications, benefits were reviewed and she elected to proceed.    DESCRIPTION OF PROCEDURE:  Following medical clearance, the patient was brought to the operating room and placed on the operating table.  General anesthesia was induced.  A block had been placed for postoperative pain control.  The right upper extremity was then elevated, exsanguinated, and tourniquet inflated to 250 mmHg.      Attention was first directed to the wrist where a transverse incision was made, carried sharply through skin and subcutaneous tissue.  Careful spreading was performed.  Antebrachial fascia was identified and a distally based U-shaped flap was elevated.  Spatula was placed beneath the transverse carpal ligament, followed by sequential dilators.  The endoscope was placed in the wrist.  The distal aspect of the transverse carpal ligament was identified.  Palmar pressure was applied.  The blade was elevated and the transverse carpal ligament released.  Following this, through an endoscopic portal both sides of the transverse carpal ligament were identified.  The endoscope was then withdrawn.  First, antebrachial fascia was released with tenotomy scissors.  A moist sponge was placed over this region.    Attention was directed to the right wrist where an incision was made over the medial epicondyle, extended proximally and distally.  An incision  was made, carried sharply down through skin and subcutaneous tissue.  Careful spreading was performed.  The ulnar nerve was identified and traced behind the medial intermuscular septum.  This was placed into an adequate bed.  The medial intermuscular septum was released, as was the FCU fascia.  Once completed, we irrigated, repaired the medial side with a 2-0 Vicryl suture.  The skin was closed with Insorb clips.  A sterile dressing was applied.  The patient was brought to recovery in stable condition.    Bandar Stevenson MD        D: 2021   T: 2021   MT: [id number]    Name:     JAY MALONECira  MRN:      [id number]        Account:        [id number]   :      10/07/1934           Procedure Date: 2021     Document: [id number]

## 2021-11-12 NOTE — CONSULTS
[facility] History and Physical    Bernarda Ramírez MRN# [id number]   Age: 87 year old YOB: 1934     Date of Admission:  11/12/2021    [facility]  Primary care provider: Abiel Brambila          Assessment and Plan:   Assessment:   Cubital and carpal tunnel right      Plan:   Right endoscopic carpal tunnel and and right ulnar nerve decompression vs transposition               Chief Complaint:   Carpal tunnel     History is obtained from the patient         History of Present Illness:   This patient is a 87 year old female who presents with the following condition requiring a hospital admission:    Carpal Tunnel Syndrome  Patient complains of right wrist and hand pain. This is evaluated as a personal injury. The onset of the pain was gradual onset The pain is described as numbing.             Past Medical History:   History reviewed. No pertinent past medical history.         Past Surgical History:   I have reviewed this patient's past surgical history and commented on sigificant events within the HPI         Social History:   I have reviewed this patient's social history         Family History:   This patient has no significant family history         Immunizations:   na         Allergies:   All allergies reviewed and addressed         Medications:     Current Facility-Administered Medications   Medication     ceFAZolin (ANCEF) intermittent infusion 2 g in 100 mL dextrose PRE-MIX     lactated ringers infusion     lidocaine (LMX4) cream     lidocaine 1 % 0.1-1 mL     sodium chloride (PF) 0.9% PF flush 3 mL     sodium chloride (PF) 0.9% PF flush 3 mL     Facility-Administered Medications Ordered in Other Encounters   Medication     bupivacaine 0.5% PF     dexamethasone PF (DECADRON) injection     dexmedetomidine (PRECEDEX) bolus 200 mcg             Review of Systems:   CONSTITUTIONAL: NEGATIVE for fever, chills, change in weight  ENT/MOUTH: NEGATIVE for ear, mouth and throat  problems  RESP: NEGATIVE for significant cough or SOB  CV: NEGATIVE for chest pain, palpitations or peripheral edema         Physical Exam:   Temp: 99.1  F (37.3  C) Temp src: Temporal BP: (!) 140/70 Pulse: 69   Resp: 15 SpO2: 100 % O2 Device: Nasal cannula Oxygen Delivery: 2 LPM  All vitals have been reviewed           Data:          Attestation:  I have reviewed today's vital signs, notes, medications, labs and imaging.    Bandar Stevenson MD

## 2021-11-12 NOTE — ANESTHESIA POSTPROCEDURE EVALUATION
Patient: Bernarda Ramírez    Procedure: Procedure(s):  RELEASE, CARPAL TUNNEL, ENDOSCOPIC  TRANSPOSITION, NERVE, ULNAR       Diagnosis:Carpal tunnel syndrome [G56.00]  Diagnosis Additional Information: No value filed.    Anesthesia Type:  MAC    Note:  Disposition: Outpatient   Postop Pain Control: Uneventful            Sign Out: Well controlled pain   PONV: No   Neuro/Psych: Uneventful            Sign Out: Acceptable/Baseline neuro status   Airway/Respiratory: Uneventful            Sign Out: Acceptable/Baseline resp. status   CV/Hemodynamics: Uneventful            Sign Out: Acceptable CV status   Other NRE: NONE   DID A NON-ROUTINE EVENT OCCUR? No           Last vitals:  Vitals Value Taken Time   /56 11/12/21 1020   Temp 97.6  F (36.4  C) 11/12/21 1000   Pulse 71 11/12/21 1020   Resp 14 11/12/21 1000   SpO2 98 % 11/12/21 1025   Vitals shown include unvalidated device data.    Electronically Signed By: KHUSHBOO Mena CRNA  November 12, 2021  12:04 PM

## 2021-11-19 ENCOUNTER — OFFICE VISIT (OUTPATIENT)
Dept: ORTHOPEDICS | Facility: OTHER | Age: 86
End: 2021-11-19
Attending: SPECIALIST
Payer: MEDICARE

## 2021-11-19 DIAGNOSIS — G56.21 CUBITAL TUNNEL SYNDROME ON RIGHT: Primary | ICD-10-CM

## 2021-11-19 PROCEDURE — G0463 HOSPITAL OUTPT CLINIC VISIT: HCPCS

## 2021-11-19 PROCEDURE — 99024 POSTOP FOLLOW-UP VISIT: CPT | Performed by: SPECIALIST

## 2021-11-19 NOTE — PROGRESS NOTES
Patient is here for follow up on her right carpal tunnel release and ulnar nerve transposition.  Lucille Cifuentes LPN .....................11/19/2021 9:59 AM

## 2021-11-19 NOTE — PROGRESS NOTES
Visit Date: 2021    Bernarda Ramírez returns for followup, status post right endoscopic carpal tunnel release with right subcutaneous ulnar nerve transposition.  She is 1 week out.    PHYSICAL EXAMINATION:  Examination today shows her incision to be healing nicely, proximally and distally.  There is no evidence of infection.    IMPRESSION AND PLAN:  One week status post endoscopic carpal tunnel release with subcutaneous ulnar nerve transposition.  Her distal sutures were removed today.  The more proximal sutures will be removed by [place].  We will begin to advance her activities.  I should see her in about 4 weeks to monitor progress, sooner if there are any problems.    Bandar Stevenson MD        D: 2021   T: 2021   MT: PAKMT    Name:     BERNARDA RAMÍREZ  MRN:      [id number]        Account:    [id number]   :      10/07/1934           Visit Date: 2021     Document: [id number]

## 2021-11-29 ENCOUNTER — OFFICE VISIT (OUTPATIENT)
Dept: ORTHOPEDICS | Facility: OTHER | Age: 86
End: 2021-11-29
Attending: SPECIALIST
Payer: MEDICARE

## 2021-11-29 DIAGNOSIS — G56.21 CUBITAL TUNNEL SYNDROME ON RIGHT: Primary | ICD-10-CM

## 2021-11-29 PROCEDURE — 99207 PR NO CHARGE NURSE ONLY: CPT

## 2021-11-29 PROCEDURE — G0463 HOSPITAL OUTPT CLINIC VISIT: HCPCS

## 2021-11-29 NOTE — PROGRESS NOTES
Patient presents to clinic today at the request of Dr. Stevenson for staple removal from her right ulnar nerve transposition done two and a half weeks ago. Incision is clean, dry and intact. No redness or drainage. Staples were removed without difficulty. Steri strips were applied. Patient will make a follow up with Dr. Stevenson for 4-6 weeks out to discuss the numbness that is still in her hand. She will cancel if it resolves. Otherwise will follow up sooner if problems occur.   Lucille Cifuentes LPN .....................11/29/2021 10:59 AM

## 2021-12-07 ENCOUNTER — OFFICE VISIT (OUTPATIENT)
Dept: FAMILY MEDICINE | Facility: OTHER | Age: 86
End: 2021-12-07
Attending: FAMILY MEDICINE
Payer: MEDICARE

## 2021-12-07 VITALS
RESPIRATION RATE: 16 BRPM | OXYGEN SATURATION: 100 % | HEART RATE: 85 BPM | SYSTOLIC BLOOD PRESSURE: 134 MMHG | DIASTOLIC BLOOD PRESSURE: 76 MMHG | TEMPERATURE: 97.9 F

## 2021-12-07 DIAGNOSIS — M60.852 OTHER MYOSITIS, LEFT THIGH: ICD-10-CM

## 2021-12-07 DIAGNOSIS — N95.2 ATROPHIC VAGINITIS: ICD-10-CM

## 2021-12-07 DIAGNOSIS — M70.72 ISCHIAL BURSITIS OF LEFT SIDE: Primary | ICD-10-CM

## 2021-12-07 DIAGNOSIS — R30.0 DYSURIA: ICD-10-CM

## 2021-12-07 DIAGNOSIS — B37.31 MONILIAL VAGINITIS: ICD-10-CM

## 2021-12-07 LAB
ALBUMIN UR-MCNC: NEGATIVE MG/DL
APPEARANCE UR: ABNORMAL
BACTERIA #/AREA URNS HPF: ABNORMAL /HPF
BILIRUB UR QL STRIP: NEGATIVE
COLOR UR AUTO: YELLOW
GLUCOSE UR STRIP-MCNC: NEGATIVE MG/DL
HGB UR QL STRIP: NEGATIVE
HYALINE CASTS: 3 /LPF
KETONES UR STRIP-MCNC: NEGATIVE MG/DL
LEUKOCYTE ESTERASE UR QL STRIP: ABNORMAL
MUCOUS THREADS #/AREA URNS LPF: PRESENT /LPF
NITRATE UR QL: NEGATIVE
PH UR STRIP: 5.5 [PH] (ref 5–9)
RBC URINE: 2 /HPF
SP GR UR STRIP: 1.02 (ref 1–1.03)
SQUAMOUS EPITHELIAL: 2 /HPF
UROBILINOGEN UR STRIP-MCNC: 2 MG/DL
WBC URINE: 63 /HPF

## 2021-12-07 PROCEDURE — G0463 HOSPITAL OUTPT CLINIC VISIT: HCPCS

## 2021-12-07 PROCEDURE — 87086 URINE CULTURE/COLONY COUNT: CPT | Mod: ZL | Performed by: FAMILY MEDICINE

## 2021-12-07 PROCEDURE — 81001 URINALYSIS AUTO W/SCOPE: CPT | Mod: ZL | Performed by: FAMILY MEDICINE

## 2021-12-07 PROCEDURE — 99213 OFFICE O/P EST LOW 20 MIN: CPT | Performed by: FAMILY MEDICINE

## 2021-12-07 RX ORDER — FLUCONAZOLE 150 MG/1
150 TABLET ORAL DAILY
Qty: 3 TABLET | Refills: 0 | Status: SHIPPED | OUTPATIENT
Start: 2021-12-07 | End: 2021-12-10

## 2021-12-07 RX ORDER — ESTRADIOL 0.1 MG/G
2 CREAM VAGINAL
Qty: 42.5 G | Refills: 11 | Status: SHIPPED | OUTPATIENT
Start: 2021-12-09 | End: 2022-05-04

## 2021-12-07 ASSESSMENT — PAIN SCALES - GENERAL: PAINLEVEL: SEVERE PAIN (7)

## 2021-12-07 NOTE — PROGRESS NOTES
Nursing Notes:   Aracelis Fish LPN  12/7/2021  3:57 PM  Signed  Chief Complaint   Patient presents with     Dysuria     off and on for a long time   she also has been having pain in both hips. The left hip is worse and the past goes down her left leg.  Aracelis Fish LPN..................12/7/2021   3:34 PM      Initial /76   Pulse 85   Temp 97.9  F (36.6  C) (Temporal)   Resp 16   LMP  (LMP Unknown)   SpO2 100%   Breastfeeding No  Estimated body mass index is 29.88 kg/m  as calculated from the following:    Height as of 11/12/21: 1.524 m (5').    Weight as of 11/12/21: 69.4 kg (153 lb).  Medication Reconciliation: complete    FOOD SECURITY SCREENING QUESTIONS  Hunger Vital Signs:  Within the past 12 months we worried whether our food would run out before we got money to buy more. Never  Within the past 12 months the food we bought just didn't last and we didn't have money to get more. Never    Aracelis Fish LPN      SUBJECTIVE:  Bernarda Ramírez  is a 87 year old female who comes in for left hip pain.  She actually describes pain in the left buttock at the origin of the hamstring.  Bothers her from time to time better when she lies down.    She wonders about her bladder infection.  She has no burning with voiding but will afterwards.  Does not have frequency or back pain.  No fever.    She had right ulnar nerve transposition and endoscopic carpal tunnel release by Dr. Stevenson on November 12.  She is doing well.  Her carpal tunnel symptoms are pretty well disappeared.  She still has some discomfort from her ulnar nerve transposition.    Past Medical, Family, and Social History reviewed and updated as noted below.   ROS is negative except as noted above       Allergies   Allergen Reactions     Sulfa Drugs Unknown   , History reviewed. No pertinent family history.,   Current Outpatient Medications   Medication     acetaminophen (TYLENOL) 650 MG CR tablet     aspirin (ASA) 81 MG EC tablet      atorvastatin (LIPITOR) 20 MG tablet     [START ON 12/9/2021] estradiol (ESTRACE) 0.1 MG/GM vaginal cream     fluconazole (DIFLUCAN) 150 MG tablet     hydrochlorothiazide (HYDRODIURIL) 12.5 MG tablet     lisinopril (ZESTRIL) 20 MG tablet     Lutein 6 MG TABS     vitamin D3 (CHOLECALCIFEROL) 50 mcg (2000 units) tablet     No current facility-administered medications for this visit.   , History reviewed. No pertinent past medical history.,   Patient Active Problem List    Diagnosis Date Noted     Chronic kidney disease, stage 3 10/12/2021     Priority: Medium     Syncope 07/28/2020     Priority: Medium     UTI (urinary tract infection) 07/28/2020     Priority: Medium     Stenosis of left vertebral artery 07/28/2020     Priority: Medium     Hypercalcemia 02/22/2019     Priority: Medium     Vitamin D deficiency 02/22/2010     Priority: Medium     Dyspepsia and disorder of function of stomach 11/10/2003     Priority: Medium     Acid peptic disease  IMO Update 10/11       Breast neoplasm 09/05/2003     Priority: Medium     Ill-defined nodular density in upper outer aspect of right breast on U/S and mammogram today; steriotactic BX scheduled in [place]  IMO Update 10/11       Convulsions (H) 11/18/2002     Priority: Medium     Seizure disorder, Petit small seizures, curently stable  IMO Update 10/11       Essential hypertension 11/18/2002     Priority: Medium     IMO Update       Bundle branch block 07/22/2002     Priority: Medium     Noted on EKG 06/17/03  IMO Update 10/11     ,   Past Surgical History:   Procedure Laterality Date     APPENDECTOMY       AS ESOPHAGOSCOPY, DIAGNOSTIC  03/06/2000     bilateral cataract  2009     BREAST BIOPSY, CORE RT/LT  09/10/2003    Range     COLONOSCOPY  07/30/2003    normal     ECTOPIC PREGNANCY SURGERY       EGD with dilation  09/14/2005     ENDOSCOPIC RELEASE CARPAL TUNNEL Right 11/12/2021    Procedure: RELEASE, CARPAL TUNNEL, ENDOSCOPIC;  Surgeon: Bandar Stevenson MD;  Location:   OR     LAPAROSCOPIC CHOLECYSTECTOMY  01/1994     Left foot spur surgery, 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 Weeks at [facility]     RELEASE TRIGGER FINGER      right thumb     TRANSPOSITION ULNAR NERVE (ELBOW) Right 11/12/2021    Procedure: TRANSPOSITION, NERVE, ULNAR;  Surgeon: Bandar Stevenson MD;  Location:  OR    and   Social History     Tobacco Use     Smoking status: Never Smoker     Smokeless tobacco: Never Used   Substance Use Topics     Alcohol use: Not Currently     OBJECTIVE:  /76   Pulse 85   Temp 97.9  F (36.6  C) (Temporal)   Resp 16   LMP  (LMP Unknown)   SpO2 100%   Breastfeeding No    EXAM:  Alert cooperative, no distress.  Her ulnar transposition scar on the right is healing well.  Carpal tunnel scar is healed completely.  Examination of her hip, she has tenderness to palpation over the ischial bursa.  Some hamstring discomfort is noted proximally.  Internal and external rotation of her hip seems to be fine.  Results for orders placed or performed in visit on 12/07/21   UA reflex to Microscopic     Status: Abnormal   Result Value Ref Range    Color Urine Yellow Colorless, Straw, Light Yellow, Yellow    Appearance Urine Slightly Cloudy (A) Clear    Glucose Urine Negative Negative mg/dL    Bilirubin Urine Negative Negative    Ketones Urine Negative Negative mg/dL    Specific Gravity Urine 1.018 1.000 - 1.030    Blood Urine Negative Negative    pH Urine 5.5 5.0 - 9.0    Protein Albumin Urine Negative Negative mg/dL    Urobilinogen Urine 2.0 Normal, 2.0 mg/dL    Nitrite Urine Negative Negative    Leukocyte Esterase Urine Large (A) Negative    Bacteria Urine Many (A) None Seen /HPF    RBC Urine 2 <=2 /HPF    WBC Urine 63 (H) <=5 /HPF    Squamous Epithelials Urine 2 (H) <=1 /HPF    Mucus Urine Present (A) None Seen /LPF    Hyaline Casts Urine 3 (H) <=2 /LPF      ASSESSMENT/Plan :    Bernarda was seen today for dysuria.    Diagnoses and all orders for this visit:    Ischial bursitis of left  side  -     Physical Therapy Referral; Future  -     XR Inj Piriformis/Trigger Pt 1/2 Muscles; Future    Dysuria  -     UA reflex to Microscopic  -     Urine Culture Aerobic Bacterial; Future    Atrophic vaginitis  -     estradiol (ESTRACE) 0.1 MG/GM vaginal cream; Place 2 g vaginally twice a week    Monilial vaginitis  -     fluconazole (DIFLUCAN) 150 MG tablet; Take 1 tablet (150 mg) by mouth daily for 3 days    Other myositis, left thigh   -     XR Inj Piriformis/Trigger Pt 1/2 Muscles; Future    I think her urine is more consistent with asymptomatic bacteriuria.  I think her intermittent dysuria may be yeast versus atrophic vaginitis.  Will empirically treat for yeast with Diflucan for 3 days.  We will also try Estrace cream if the discomfort does not improve.  We will also await culture.    I think she has an ischial bursitis.  I think she probably would benefit from physical therapy and a trigger point injection and referral sent for same. Call or return if worsening or not improving.       Abiel Brambila MD

## 2021-12-07 NOTE — NURSING NOTE
Chief Complaint   Patient presents with     Dysuria     off and on for a long time   she also has been having pain in both hips. The left hip is worse and the past goes down her left leg.  Aracelis Fish LPN..................12/7/2021   3:34 PM      Initial /76   Pulse 85   Temp 97.9  F (36.6  C) (Temporal)   Resp 16   LMP  (LMP Unknown)   SpO2 100%   Breastfeeding No  Estimated body mass index is 29.88 kg/m  as calculated from the following:    Height as of 11/12/21: 1.524 m (5').    Weight as of 11/12/21: 69.4 kg (153 lb).  Medication Reconciliation: complete    FOOD SECURITY SCREENING QUESTIONS  Hunger Vital Signs:  Within the past 12 months we worried whether our food would run out before we got money to buy more. Never  Within the past 12 months the food we bought just didn't last and we didn't have money to get more. Never    Aracelis Fish LPN

## 2021-12-10 DIAGNOSIS — B96.20 E. COLI UTI: Primary | ICD-10-CM

## 2021-12-10 DIAGNOSIS — N39.0 E. COLI UTI: Primary | ICD-10-CM

## 2021-12-10 LAB — BACTERIA UR CULT: ABNORMAL

## 2021-12-10 RX ORDER — CEPHALEXIN 500 MG/1
500 CAPSULE ORAL 2 TIMES DAILY
Qty: 14 CAPSULE | Refills: 0 | Status: SHIPPED | OUTPATIENT
Start: 2021-12-10 | End: 2021-12-17

## 2022-01-07 ENCOUNTER — HOSPITAL ENCOUNTER (OUTPATIENT)
Dept: GENERAL RADIOLOGY | Facility: OTHER | Age: 87
Discharge: HOME | End: 2022-01-07
Attending: FAMILY MEDICINE | Admitting: FAMILY MEDICINE
Payer: MEDICARE

## 2022-01-07 DIAGNOSIS — M70.72 ISCHIAL BURSITIS OF LEFT SIDE: ICD-10-CM

## 2022-01-07 DIAGNOSIS — M60.852 OTHER MYOSITIS, LEFT THIGH: ICD-10-CM

## 2022-01-07 PROCEDURE — 255N000002 HC RX 255 OP 636: Performed by: RADIOLOGY

## 2022-01-07 PROCEDURE — 250N000011 HC RX IP 250 OP 636: Performed by: RADIOLOGY

## 2022-01-07 PROCEDURE — 77002 NEEDLE LOCALIZATION BY XRAY: CPT

## 2022-01-07 PROCEDURE — 250N000009 HC RX 250: Performed by: RADIOLOGY

## 2022-01-07 RX ORDER — LIDOCAINE HYDROCHLORIDE 10 MG/ML
20 INJECTION, SOLUTION INFILTRATION; PERINEURAL ONCE
Status: COMPLETED | OUTPATIENT
Start: 2022-01-07 | End: 2022-01-07

## 2022-01-07 RX ORDER — BUPIVACAINE HYDROCHLORIDE 5 MG/ML
10 INJECTION, SOLUTION EPIDURAL; INTRACAUDAL ONCE
Status: COMPLETED | OUTPATIENT
Start: 2022-01-07 | End: 2022-01-07

## 2022-01-07 RX ORDER — TRIAMCINOLONE ACETONIDE 40 MG/ML
40 INJECTION, SUSPENSION INTRA-ARTICULAR; INTRAMUSCULAR ONCE
Status: COMPLETED | OUTPATIENT
Start: 2022-01-07 | End: 2022-01-07

## 2022-01-07 RX ADMIN — IOHEXOL 2 ML: 240 INJECTION, SOLUTION INTRATHECAL; INTRAVASCULAR; INTRAVENOUS; ORAL at 14:43

## 2022-01-07 RX ADMIN — LIDOCAINE HYDROCHLORIDE 2 ML: 10 INJECTION, SOLUTION INFILTRATION; PERINEURAL at 14:45

## 2022-01-07 RX ADMIN — TRIAMCINOLONE ACETONIDE 40 MG: 40 INJECTION, SUSPENSION INTRA-ARTICULAR; INTRAMUSCULAR at 14:45

## 2022-01-07 RX ADMIN — BUPIVACAINE HYDROCHLORIDE 3 ML: 5 INJECTION, SOLUTION EPIDURAL; INTRACAUDAL at 14:44

## 2022-01-18 ENCOUNTER — PATIENT OUTREACH (OUTPATIENT)
Dept: CARE COORDINATION | Facility: CLINIC | Age: 87
End: 2022-01-18
Payer: COMMERCIAL

## 2022-01-18 NOTE — PROGRESS NOTES
Clinic Care Coordination Contact  Care Team Conversations    Clinic Care Coordination Contact  Care Team Conversations    Call received from ana Pololck  to update visit with patient and significant other, Kolton.  She reports they accepted the grocery delivery program, medical rides during the winter and potentially help at home later, if needed.  Kolton does not drive due to legal blindness. Bernarda normally drives, however does not feel comfortable during the winter months. She is experiencing increased back pain recently and having more difficulty with mobility.     Plan:  Elder Thornton is going to start services with them and let CC know of any other concerns. She said patient was very pleased with care coordination and knows he can reach out to us with anything, as he has done in the past.   Mahi Minor RN on 1/18/2022 at 3:09 PM

## 2022-01-27 ENCOUNTER — HOSPITAL ENCOUNTER (EMERGENCY)
Facility: OTHER | Age: 87
Discharge: HOME | End: 2022-01-27
Attending: FAMILY MEDICINE | Admitting: FAMILY MEDICINE
Payer: MEDICARE

## 2022-01-27 VITALS
TEMPERATURE: 98.1 F | SYSTOLIC BLOOD PRESSURE: 141 MMHG | HEIGHT: 60 IN | HEART RATE: 79 BPM | BODY MASS INDEX: 30.04 KG/M2 | RESPIRATION RATE: 18 BRPM | OXYGEN SATURATION: 96 % | WEIGHT: 153 LBS | DIASTOLIC BLOOD PRESSURE: 71 MMHG

## 2022-01-27 DIAGNOSIS — N95.0 POST-MENOPAUSAL BLEEDING: ICD-10-CM

## 2022-01-27 DIAGNOSIS — N39.0 ACUTE UTI: ICD-10-CM

## 2022-01-27 LAB
ALBUMIN UR-MCNC: 10 MG/DL
APPEARANCE UR: ABNORMAL
BACTERIA #/AREA URNS HPF: ABNORMAL /HPF
BILIRUB UR QL STRIP: NEGATIVE
COLOR UR AUTO: ABNORMAL
GLUCOSE UR STRIP-MCNC: NEGATIVE MG/DL
HGB UR QL STRIP: ABNORMAL
KETONES UR STRIP-MCNC: NEGATIVE MG/DL
LEUKOCYTE ESTERASE UR QL STRIP: ABNORMAL
MUCOUS THREADS #/AREA URNS LPF: PRESENT /LPF
NITRATE UR QL: POSITIVE
PH UR STRIP: 6.5 [PH] (ref 5–9)
RBC URINE: >182 /HPF
SP GR UR STRIP: 1.01 (ref 1–1.03)
SQUAMOUS EPITHELIAL: 2 /HPF
UROBILINOGEN UR STRIP-MCNC: NORMAL MG/DL
WBC URINE: 47 /HPF

## 2022-01-27 PROCEDURE — 81001 URINALYSIS AUTO W/SCOPE: CPT | Performed by: FAMILY MEDICINE

## 2022-01-27 PROCEDURE — 99283 EMERGENCY DEPT VISIT LOW MDM: CPT | Performed by: FAMILY MEDICINE

## 2022-01-27 PROCEDURE — 87086 URINE CULTURE/COLONY COUNT: CPT | Performed by: FAMILY MEDICINE

## 2022-01-27 RX ORDER — CEPHALEXIN 500 MG/1
500 CAPSULE ORAL 4 TIMES DAILY
Qty: 28 CAPSULE | Refills: 0 | Status: SHIPPED | OUTPATIENT
Start: 2022-01-27 | End: 2022-02-03

## 2022-01-27 ASSESSMENT — MIFFLIN-ST. JEOR: SCORE: 1050.5

## 2022-01-27 ASSESSMENT — ENCOUNTER SYMPTOMS
FEVER: 0
ABDOMINAL PAIN: 0
SHORTNESS OF BREATH: 0

## 2022-01-27 NOTE — ED PROVIDER NOTES
"  History   No chief complaint on file.    HPI  Bernarda Ramírez is a 87 year old female who presents to the emergency department with burning with urination that she feels is likely a UTI.  This morning patient's boyfriend told her to put some antibiotic up in her to vaginal area.  She has had some vaginal bleeding as well.    Reviewed nurses notes below, similar history is related to me  Pt to ER with daughter from home.  Pt feels like a bladder infection \"for a while\".  Wiped this AM after void, blood on paper, and clot in toilet.  Pain, itching, burning now. In NAD. Daughter states pts boyfriend told her that this AM he and pt dissolved some type of antibiotic in water, and injected that into pt vagina.  Allergies:  Allergies   Allergen Reactions     Sulfa Drugs Unknown       Problem List:    Patient Active Problem List    Diagnosis Date Noted     Chronic kidney disease, stage 3 10/12/2021     Priority: Medium     Syncope 07/28/2020     Priority: Medium     UTI (urinary tract infection) 07/28/2020     Priority: Medium     Stenosis of left vertebral artery 07/28/2020     Priority: Medium     Hypercalcemia 02/22/2019     Priority: Medium     Vitamin D deficiency 02/22/2010     Priority: Medium     Dyspepsia and disorder of function of stomach 11/10/2003     Priority: Medium     Acid peptic disease  IMO Update 10/11       Breast neoplasm 09/05/2003     Priority: Medium     Ill-defined nodular density in upper outer aspect of right breast on U/S and mammogram today; [place]  IMO Update 10/11       Convulsions (H) 11/18/2002     Priority: Medium     Seizure disorder, Petit small seizures, curently stable  IMO Update 10/11       Essential hypertension 11/18/2002     Priority: Medium     IMO Update       Bundle branch block 07/22/2002     Priority: Medium     Noted on EKG 06/17/03  IMO Update 10/11          Past Medical History:    History reviewed. No pertinent past medical history.    Past " Surgical History:    Past Surgical History:   Procedure Laterality Date     APPENDECTOMY       AS ESOPHAGOSCOPY, DIAGNOSTIC  03/06/2000     bilateral cataract  2009     BREAST BIOPSY, CORE RT/LT  09/10/2003    Range     COLONOSCOPY  07/30/2003    normal     ECTOPIC PREGNANCY SURGERY       EGD with dilation  09/14/2005     ENDOSCOPIC RELEASE CARPAL TUNNEL Right 11/12/2021    Procedure: RELEASE, CARPAL TUNNEL, ENDOSCOPIC;  Surgeon: Bandar Stevenson MD;  Location:  OR     LAPAROSCOPIC CHOLECYSTECTOMY  01/1994     Left foot spur surgery, 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 Weeks at [facility]     RELEASE TRIGGER FINGER      right thumb     TRANSPOSITION ULNAR NERVE (ELBOW) Right 11/12/2021    Procedure: TRANSPOSITION, NERVE, ULNAR;  Surgeon: Bandar Stevenson MD;  Location:  OR       Family History:    History reviewed. No pertinent family history.    Social History:  Marital Status:   [5]  Social History     Tobacco Use     Smoking status: Never Smoker     Smokeless tobacco: Never Used   Vaping Use     Vaping Use: Never used   Substance Use Topics     Alcohol use: Not Currently     Drug use: Never        Medications:    cephALEXin (KEFLEX) 500 MG capsule  acetaminophen (TYLENOL) 650 MG CR tablet  aspirin (ASA) 81 MG EC tablet  atorvastatin (LIPITOR) 20 MG tablet  estradiol (ESTRACE) 0.1 MG/GM vaginal cream  hydrochlorothiazide (HYDRODIURIL) 12.5 MG tablet  lisinopril (ZESTRIL) 20 MG tablet  Lutein 6 MG TABS  vitamin D3 (CHOLECALCIFEROL) 50 mcg (2000 units) tablet          Review of Systems   Constitutional: Negative for fever.   Respiratory: Negative for shortness of breath.    Cardiovascular: Negative for chest pain.   Gastrointestinal: Negative for abdominal pain.   All other systems reviewed and are negative.      Physical Exam   BP: 139/59  Pulse: 72  Temp: 98.1  F (36.7  C)  Resp: 20  Height: 152.4 cm (5')  Weight: 69.4 kg (153 lb)  SpO2: 98 %      Physical Exam  Vitals and nursing note reviewed.    Constitutional:       General: She is not in acute distress.     Appearance: She is not diaphoretic.   HENT:      Head: Atraumatic.      Mouth/Throat:      Pharynx: No oropharyngeal exudate.   Eyes:      General: No scleral icterus.     Pupils: Pupils are equal, round, and reactive to light.   Cardiovascular:      Heart sounds: Normal heart sounds.   Pulmonary:      Effort: No respiratory distress.      Breath sounds: Normal breath sounds.   Abdominal:      General: Bowel sounds are normal.      Palpations: Abdomen is soft.      Tenderness: There is no abdominal tenderness.   Musculoskeletal:         General: No tenderness.   Skin:     General: Skin is warm.      Findings: No rash.     I completed a pelvic exam, she is having trace bleeding from the cervical os.  There is no vaginal laceration.  She also does appear to have some bleeding coming from the urethra    ED Course        Results for orders placed or performed during the hospital encounter of 01/27/22 (from the past 24 hour(s))   UA reflex to Microscopic and Culture    Specimen: Urine, Midstream   Result Value Ref Range    Color Urine Light Yellow Colorless, Straw, Light Yellow, Yellow    Appearance Urine Slightly Cloudy (A) Clear    Glucose Urine Negative Negative mg/dL    Bilirubin Urine Negative Negative    Ketones Urine Negative Negative mg/dL    Specific Gravity Urine 1.015 1.000 - 1.030    Blood Urine Large (A) Negative    pH Urine 6.5 5.0 - 9.0    Protein Albumin Urine 10  (A) Negative mg/dL    Urobilinogen Urine Normal Normal, 2.0 mg/dL    Nitrite Urine Positive (A) Negative    Leukocyte Esterase Urine Moderate (A) Negative    Bacteria Urine Moderate (A) None Seen /HPF    Mucus Urine Present (A) None Seen /LPF    RBC Urine >182 (H) <=2 /HPF    WBC Urine 47 (H) <=5 /HPF    Squamous Epithelials Urine 2 (H) <=1 /HPF    Narrative    Urine Culture ordered based on laboratory criteria       Medications - No data to display    Assessments & Plan (with  Medical Decision Making)     I have reviewed the nursing notes.    I have reviewed the findings, diagnosis, plan and need for follow up with the patient.      Discharge Medication List as of 1/27/2022  3:26 PM      START taking these medications    Details   cephALEXin (KEFLEX) 500 MG capsule Take 1 capsule (500 mg) by mouth 4 times daily for 7 days, Disp-28 capsule, R-0, E-Prescribe         Urinalysis is consistent with UTI so we will treat her for a acute UTI particularly given the dysuria and the frequency.  However she is having postmenopausal uterine bleeding and so a gynecology consult was placed as well.  Bleeding is stable and certainly not hemodynamically significant at this time so outpatient follow-up with gynecology certainly reasonable.  She should return to the emergency department however with bleeding greater than 1 pad per hour, lightheadedness or perceived increase in volume of bleeding.    Final diagnoses:   Acute UTI   Post-menopausal bleeding       1/27/2022   [facility]     Brian Mari MD  01/27/22 0560

## 2022-01-27 NOTE — ED NOTES
Spoke with patient.  She believes it was an old Penicillin pill that they instilled into her vagina this am /  Ольга Nixon RN on 1/27/2022 at 3:01 PM

## 2022-01-27 NOTE — ED TRIAGE NOTES
"Pt to ER with daughter from home.  Pt feels like a bladder infection \"for a while\".  Wiped this AM after void, blood on paper, and clot in toilet.  Pain, itching, burning now. In NAD. Daughter states pts boyfriend told her that this AM he and pt dissolved some type of antibiotic in water, and injected that into pt vagina.  "

## 2022-01-30 LAB — BACTERIA UR CULT: ABNORMAL

## 2022-01-31 ENCOUNTER — OFFICE VISIT (OUTPATIENT)
Dept: OBGYN | Facility: OTHER | Age: 87
End: 2022-01-31
Attending: FAMILY MEDICINE
Payer: MEDICARE

## 2022-01-31 ENCOUNTER — HOSPITAL ENCOUNTER (OUTPATIENT)
Dept: ULTRASOUND IMAGING | Facility: OTHER | Age: 87
End: 2022-01-31
Attending: OBSTETRICS & GYNECOLOGY
Payer: MEDICARE

## 2022-01-31 VITALS
DIASTOLIC BLOOD PRESSURE: 70 MMHG | HEART RATE: 75 BPM | BODY MASS INDEX: 28.16 KG/M2 | SYSTOLIC BLOOD PRESSURE: 128 MMHG | OXYGEN SATURATION: 97 % | WEIGHT: 144.2 LBS | RESPIRATION RATE: 16 BRPM

## 2022-01-31 DIAGNOSIS — N95.0 POST-MENOPAUSAL BLEEDING: ICD-10-CM

## 2022-01-31 PROCEDURE — 87624 HPV HI-RISK TYP POOLED RSLT: CPT | Mod: ZL | Performed by: OBSTETRICS & GYNECOLOGY

## 2022-01-31 PROCEDURE — G0463 HOSPITAL OUTPT CLINIC VISIT: HCPCS | Mod: 25 | Performed by: OBSTETRICS & GYNECOLOGY

## 2022-01-31 PROCEDURE — 88305 TISSUE EXAM BY PATHOLOGIST: CPT

## 2022-01-31 PROCEDURE — 58100 BIOPSY OF UTERUS LINING: CPT | Performed by: OBSTETRICS & GYNECOLOGY

## 2022-01-31 PROCEDURE — 76830 TRANSVAGINAL US NON-OB: CPT

## 2022-01-31 PROCEDURE — 88142 CYTOPATH C/V THIN LAYER: CPT | Performed by: OBSTETRICS & GYNECOLOGY

## 2022-01-31 PROCEDURE — 99204 OFFICE O/P NEW MOD 45 MIN: CPT | Mod: 25 | Performed by: OBSTETRICS & GYNECOLOGY

## 2022-01-31 NOTE — PROGRESS NOTES
Pt. Here today for a consult for vaginal bleeding since last Thursday and has noticed it when she wipes, has a burning but has been getting better. States she knows it is vaginal as she has checked with her kleenex when she wipes, has lost 9 lbs since November with no change in eating habits. No blood noted in her stools. Her concern is with the bleeding.     FOOD SECURITY SCREENING QUESTIONS:    The next two questions are to help us understand your food security.  If you are feeling you need any assistance in this area, we have resources available to support you today.    Hunger Vital Signs:  Within the past 12 months we worried whether our food would run out before we got money to buy more. Never  Within the past 12 months the food we bought just didn't last and we didn't have money to get more. Never    Chief Complaint   Patient presents with     Consult     Vaginal bleeding       Initial /70 (BP Location: Left arm, Patient Position: Sitting, Cuff Size: Adult Regular)   Pulse 75   Resp 16   Wt 65.4 kg (144 lb 3.2 oz)   LMP  (LMP Unknown)   SpO2 97%   BMI 28.16 kg/m   Estimated body mass index is 28.16 kg/m  as calculated from the following:    Height as of 1/27/22: 1.524 m (5').    Weight as of this encounter: 65.4 kg (144 lb 3.2 oz).  Medication Reconciliation: patt Calix RN

## 2022-01-31 NOTE — PROGRESS NOTES
CC: vaginal bleeding  HPI:  Bernarda is a 87 year old female who presents for evaluation of bleeding vaginally which started last Thursday evening. She noted blood in the toilet and on TP when she wiped. She went to ED and was diagnosed with UTI, and sent for consultation in follow up. She is a P5, all vaginal deliveries. She is hard of hearing and is present with her 64 yo daughter today. She lives with a 'man friend'.  No LMP recorded (lmp unknown). Patient is postmenopausal.      OB History    Para Term  AB Living   6 5 0 0 1 5   SAB IAB Ectopic Multiple Live Births   0 0 1 0 0      # Outcome Date GA Lbr Alfonso/2nd Weight Sex Delivery Anes PTL Lv   6 Para 74    F          Name: Tamika   5 Para 70    F          Name: Josefa   4 Para 65    F          Name: Mer   3 Para 57    F          Name: Lyly   2 Para 54    F          Name: Fany   1 Ectopic              No past medical history on file.  Past Surgical History:   Procedure Laterality Date     APPENDECTOMY       AS ESOPHAGOSCOPY, DIAGNOSTIC  2000     bilateral cataract  2009     BREAST BIOPSY, CORE RT/LT  09/10/2003    Range     COLONOSCOPY  2003    normal     ECTOPIC PREGNANCY SURGERY       EGD with dilation  2005     ENDOSCOPIC RELEASE CARPAL TUNNEL Right 2021    Procedure: RELEASE, CARPAL TUNNEL, ENDOSCOPIC;  Surgeon: Bandar Stevenson MD;  Location:  OR     LAPAROSCOPIC CHOLECYSTECTOMY  1994     Left foot spur surgery, Bora's deformity  2003    Dr. Weeks at [facility]     RELEASE TRIGGER FINGER      right thumb     TRANSPOSITION ULNAR NERVE (ELBOW) Right 2021    Procedure: TRANSPOSITION, NERVE, ULNAR;  Surgeon: Bandar Stevenson MD;  Location:  OR     Social History     Socioeconomic History     Marital status:      Spouse name: Not on file     Number of children: Not on file     Years of education: Not on file     Highest education level: Not on file    Occupational History     Not on file   Tobacco Use     Smoking status: Never Smoker     Smokeless tobacco: Never Used   Vaping Use     Vaping Use: Never used   Substance and Sexual Activity     Alcohol use: Not Currently     Drug use: Never     Sexual activity: Not Currently   Other Topics Concern     Not on file   Social History Narrative    [place] in [place]  She graduated from  there. 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  . She has 5 daughters.        Now lives [place] of the [facility] with a significant other, she otherwise remains independent, Jakob Anthony (Bud).        She comes from a family of 14.  6 are still living. She has a twin sister who lives in [place], [place]. She had polio.        Her middle daughter works as a nurse in the [employer].         One daughter lives 8 miles away and they don't get along.         One daughter lives in [place] and runs a [employer].        One daughter helps her with groceries and cleaning.      Social Determinants of Health     Financial Resource Strain: Not on file   Food Insecurity: Not on file   Transportation Needs: Not on file   Physical Activity: Not on file   Stress: Not on file   Social Connections: Not on file   Intimate Partner Violence: Not on file   Housing Stability: Not on file     History reviewed. No pertinent family history.    Current Outpatient Medications   Medication     acetaminophen (TYLENOL) 650 MG CR tablet     aspirin (ASA) 81 MG EC tablet     atorvastatin (LIPITOR) 20 MG tablet     cephALEXin (KEFLEX) 500 MG capsule     estradiol (ESTRACE) 0.1 MG/GM vaginal cream     hydrochlorothiazide (HYDRODIURIL) 12.5 MG tablet     lisinopril (ZESTRIL) 20 MG tablet     Lutein 6 MG TABS     vitamin D3 (CHOLECALCIFEROL) 50 mcg (2000 units) tablet     No current facility-administered medications for this visit.     Allergies   Allergen Reactions     Sulfa Drugs Unknown     /70 (BP Location:  Left arm, Patient Position: Sitting, Cuff Size: Adult Regular)   Pulse 75   Resp 16   Wt 65.4 kg (144 lb 3.2 oz)   LMP  (LMP Unknown)   SpO2 97%   BMI 28.16 kg/m      REVIEW OF SYSTEMS  Neg except as above  Denies pain or dysuria today  Uncertain of history of pap smears    Exam:  Constitutional: alert and no distress    Pelvic:   Atrophic external genitalia noted.  Speculum was inserted and cystocele identified.  Cervix is visualized and grossly appears within normal limits.  Pap smear was obtained today.  After consent was obtained the cervix was prepped with Betadine.  A Pipelle instrument was inserted without difficulty and 3 passes were productive of a modest amount of tissue.  The patient tolerated this well.  Bimanual was then performed demonstrating a small mobile uterus without palpable adnexal masses.  Pelvic relaxation was noted.    Lab: No results found for any visits on 01/31/22.    ASSESSMENT/PLAN :  1. Post-menopausal bleeding      Will inform her daughter when results are available for the biopsy and Pap smear.  She was transferred to radiology for ultrasound.    Lennox Floyd MD FACOG  3:49 PM 1/31/2022

## 2022-02-03 LAB
PATH REPORT.COMMENTS IMP SPEC: NORMAL
PATH REPORT.FINAL DX SPEC: NORMAL
PHOTO IMAGE: NORMAL

## 2022-02-04 ENCOUNTER — MYC MEDICAL ADVICE (OUTPATIENT)
Dept: OBGYN | Facility: OTHER | Age: 87
End: 2022-02-04
Payer: COMMERCIAL

## 2022-02-04 LAB
BKR LAB AP GYN ADEQUACY: NORMAL
BKR LAB AP GYN INTERPRETATION: NORMAL
BKR LAB AP HPV REFLEX: NORMAL
BKR LAB AP PREVIOUS ABNORMAL: NORMAL
PATH REPORT.COMMENTS IMP SPEC: NORMAL
PATH REPORT.COMMENTS IMP SPEC: NORMAL
PATH REPORT.RELEVANT HX SPEC: NORMAL

## 2022-02-04 NOTE — TELEPHONE ENCOUNTER
Notified that per report sample shows no malignancy. Discussed dr. Floyd will review on Monday and will notify of next steps as needed.     Dedra Apodaca RN on 2/4/2022 at 8:05 AM

## 2022-02-07 ENCOUNTER — MYC MEDICAL ADVICE (OUTPATIENT)
Dept: OBGYN | Facility: OTHER | Age: 87
End: 2022-02-07
Payer: COMMERCIAL

## 2022-02-07 LAB
HUMAN PAPILLOMA VIRUS 16 DNA: NEGATIVE
HUMAN PAPILLOMA VIRUS 18 DNA: NEGATIVE
HUMAN PAPILLOMA VIRUS FINAL DIAGNOSIS: NORMAL
HUMAN PAPILLOMA VIRUS OTHER HR: NEGATIVE

## 2022-02-08 NOTE — TELEPHONE ENCOUNTER
Call placed to patients daughter to set up surgery. Date of 3/9/22. Will have  call to set up all needed appointments. No further questions or concerns at this time.   Pati Calix RN on 2/8/2022 at 3:23 PM

## 2022-02-09 ENCOUNTER — PREP FOR PROCEDURE (OUTPATIENT)
Dept: OBGYN | Facility: OTHER | Age: 87
End: 2022-02-09
Payer: COMMERCIAL

## 2022-02-09 DIAGNOSIS — N95.0 POSTMENOPAUSAL BLEEDING: Primary | ICD-10-CM

## 2022-02-09 RX ORDER — ACETAMINOPHEN 325 MG/1
975 TABLET ORAL ONCE
Status: CANCELLED | OUTPATIENT
Start: 2022-02-09 | End: 2022-02-09

## 2022-02-23 ENCOUNTER — OFFICE VISIT (OUTPATIENT)
Dept: FAMILY MEDICINE | Facility: OTHER | Age: 87
End: 2022-02-23
Attending: FAMILY MEDICINE
Payer: COMMERCIAL

## 2022-02-23 VITALS
RESPIRATION RATE: 16 BRPM | TEMPERATURE: 98.1 F | OXYGEN SATURATION: 100 % | DIASTOLIC BLOOD PRESSURE: 80 MMHG | HEART RATE: 72 BPM | BODY MASS INDEX: 26.81 KG/M2 | WEIGHT: 142 LBS | SYSTOLIC BLOOD PRESSURE: 120 MMHG | HEIGHT: 61 IN

## 2022-02-23 DIAGNOSIS — Z01.818 PREOP GENERAL PHYSICAL EXAM: Primary | ICD-10-CM

## 2022-02-23 DIAGNOSIS — R20.0 NUMBNESS AND TINGLING IN RIGHT HAND: ICD-10-CM

## 2022-02-23 DIAGNOSIS — R20.2 NUMBNESS AND TINGLING IN RIGHT HAND: ICD-10-CM

## 2022-02-23 DIAGNOSIS — N18.32 STAGE 3B CHRONIC KIDNEY DISEASE (H): ICD-10-CM

## 2022-02-23 DIAGNOSIS — R30.0 DYSURIA: ICD-10-CM

## 2022-02-23 DIAGNOSIS — N95.0 POSTMENOPAUSAL BLEEDING: ICD-10-CM

## 2022-02-23 DIAGNOSIS — I10 ESSENTIAL HYPERTENSION: ICD-10-CM

## 2022-02-23 LAB
ALBUMIN SERPL-MCNC: 4.3 G/DL (ref 3.5–5.7)
ALBUMIN UR-MCNC: 10 MG/DL
ALP SERPL-CCNC: 46 U/L (ref 34–104)
ALT SERPL W P-5'-P-CCNC: 11 U/L (ref 7–52)
ANION GAP SERPL CALCULATED.3IONS-SCNC: 7 MMOL/L (ref 3–14)
APPEARANCE UR: ABNORMAL
AST SERPL W P-5'-P-CCNC: 14 U/L (ref 13–39)
BACTERIA #/AREA URNS HPF: ABNORMAL /HPF
BASOPHILS # BLD AUTO: 0 10E3/UL (ref 0–0.2)
BASOPHILS NFR BLD AUTO: 0 %
BILIRUB SERPL-MCNC: 0.8 MG/DL (ref 0.3–1)
BILIRUB UR QL STRIP: NEGATIVE
BUN SERPL-MCNC: 31 MG/DL (ref 7–25)
CALCIUM SERPL-MCNC: 10.6 MG/DL (ref 8.6–10.3)
CHLORIDE BLD-SCNC: 100 MMOL/L (ref 98–107)
CO2 SERPL-SCNC: 31 MMOL/L (ref 21–31)
COLOR UR AUTO: YELLOW
CREAT SERPL-MCNC: 1.22 MG/DL (ref 0.6–1.2)
EOSINOPHIL # BLD AUTO: 0.1 10E3/UL (ref 0–0.7)
EOSINOPHIL NFR BLD AUTO: 1 %
ERYTHROCYTE [DISTWIDTH] IN BLOOD BY AUTOMATED COUNT: 12.7 % (ref 10–15)
GFR SERPL CREATININE-BSD FRML MDRD: 43 ML/MIN/1.73M2
GLUCOSE BLD-MCNC: 87 MG/DL (ref 70–105)
GLUCOSE UR STRIP-MCNC: NEGATIVE MG/DL
HCT VFR BLD AUTO: 38.7 % (ref 35–47)
HGB BLD-MCNC: 12.8 G/DL (ref 11.7–15.7)
HGB UR QL STRIP: NEGATIVE
IMM GRANULOCYTES # BLD: 0 10E3/UL
IMM GRANULOCYTES NFR BLD: 1 %
KETONES UR STRIP-MCNC: NEGATIVE MG/DL
LEUKOCYTE ESTERASE UR QL STRIP: ABNORMAL
LYMPHOCYTES # BLD AUTO: 1.4 10E3/UL (ref 0.8–5.3)
LYMPHOCYTES NFR BLD AUTO: 23 %
MCH RBC QN AUTO: 30.4 PG (ref 26.5–33)
MCHC RBC AUTO-ENTMCNC: 33.1 G/DL (ref 31.5–36.5)
MCV RBC AUTO: 92 FL (ref 78–100)
MONOCYTES # BLD AUTO: 0.6 10E3/UL (ref 0–1.3)
MONOCYTES NFR BLD AUTO: 9 %
MUCOUS THREADS #/AREA URNS LPF: PRESENT /LPF
NEUTROPHILS # BLD AUTO: 4.3 10E3/UL (ref 1.6–8.3)
NEUTROPHILS NFR BLD AUTO: 66 %
NITRATE UR QL: NEGATIVE
NRBC # BLD AUTO: 0 10E3/UL
NRBC BLD AUTO-RTO: 0 /100
PH UR STRIP: 6.5 [PH] (ref 5–9)
PLATELET # BLD AUTO: 247 10E3/UL (ref 150–450)
POTASSIUM BLD-SCNC: 4 MMOL/L (ref 3.5–5.1)
PROT SERPL-MCNC: 6.2 G/DL (ref 6.4–8.9)
RBC # BLD AUTO: 4.21 10E6/UL (ref 3.8–5.2)
RBC URINE: 1 /HPF
SODIUM SERPL-SCNC: 138 MMOL/L (ref 134–144)
SP GR UR STRIP: 1.02 (ref 1–1.03)
SQUAMOUS EPITHELIAL: 4 /HPF
UROBILINOGEN UR STRIP-MCNC: NORMAL MG/DL
WBC # BLD AUTO: 6.4 10E3/UL (ref 4–11)
WBC CLUMPS #/AREA URNS HPF: PRESENT /HPF
WBC URINE: 22 /HPF

## 2022-02-23 PROCEDURE — 36415 COLL VENOUS BLD VENIPUNCTURE: CPT | Mod: ZL | Performed by: FAMILY MEDICINE

## 2022-02-23 PROCEDURE — 87186 SC STD MICRODIL/AGAR DIL: CPT | Mod: ZL | Performed by: FAMILY MEDICINE

## 2022-02-23 PROCEDURE — G0463 HOSPITAL OUTPT CLINIC VISIT: HCPCS

## 2022-02-23 PROCEDURE — 82040 ASSAY OF SERUM ALBUMIN: CPT | Mod: ZL | Performed by: FAMILY MEDICINE

## 2022-02-23 PROCEDURE — 81001 URINALYSIS AUTO W/SCOPE: CPT | Mod: ZL | Performed by: FAMILY MEDICINE

## 2022-02-23 PROCEDURE — G0463 HOSPITAL OUTPT CLINIC VISIT: HCPCS | Mod: 25

## 2022-02-23 PROCEDURE — 93010 ELECTROCARDIOGRAM REPORT: CPT | Performed by: INTERNAL MEDICINE

## 2022-02-23 PROCEDURE — 93005 ELECTROCARDIOGRAM TRACING: CPT | Performed by: FAMILY MEDICINE

## 2022-02-23 PROCEDURE — 80053 COMPREHEN METABOLIC PANEL: CPT | Mod: ZL | Performed by: FAMILY MEDICINE

## 2022-02-23 PROCEDURE — 99214 OFFICE O/P EST MOD 30 MIN: CPT | Performed by: FAMILY MEDICINE

## 2022-02-23 PROCEDURE — 85025 COMPLETE CBC W/AUTO DIFF WBC: CPT | Mod: ZL | Performed by: FAMILY MEDICINE

## 2022-02-23 ASSESSMENT — PAIN SCALES - GENERAL: PAINLEVEL: EXTREME PAIN (8)

## 2022-02-23 NOTE — NURSING NOTE
"Chief Complaint   Patient presents with     Pre-Op Exam     surgery date 03/09/2022       Initial /80   Pulse 72   Temp 98.1  F (36.7  C) (Temporal)   Resp 16   Ht 1.537 m (5' 0.5\")   Wt 64.4 kg (142 lb)   LMP  (LMP Unknown)   SpO2 100%   Breastfeeding No   BMI 27.28 kg/m   Estimated body mass index is 27.28 kg/m  as calculated from the following:    Height as of this encounter: 1.537 m (5' 0.5\").    Weight as of this encounter: 64.4 kg (142 lb).  Medication Reconciliation: complete    FOOD SECURITY SCREENING QUESTIONS  Hunger Vital Signs:  Within the past 12 months we worried whether our food would run out before we got money to buy more. Never  Within the past 12 months the food we bought just didn't last and we didn't have money to get more. Never    Aracelis Fish, EDIE  "

## 2022-02-23 NOTE — PROGRESS NOTES
[facility] AND \[facility]\""  [address]  [place] [address]  Phone: [phone]  Fax: [phone]  Primary Provider: Leida Brambila  Pre-op Performing Provider: LEIDA BRAMBILA      PREOPERATIVE EVALUATION:  Today's date: 2/23/2022    Bernarda Ramírez is a 87 year old female who presents for a preoperative evaluation.    Surgical Information:  Surgery/Procedure: Hysteroscopy and diagnostic D&C  Surgery Location: [facility]  Surgeon: Dr Floyd  Surgery Date: 03/09/2022  Time of Surgery: TBD  Where patient plans to recover: At home with family  Fax number for surgical facility: Note does not need to be faxed, will be available electronically in Epic.    Type of Anesthesia Anticipated: to be determined        Subjective     HPI related to upcoming procedure: Hysteroscopy and diagnostic D&C.    She has postmenopausal bleeding last month.  She saw Dr. Floyd and he did pap and EMB with benign results, but has thickened endometrium on US, so is scheduled for the above procedure.     She still having some numbness and tingling in the fingers under right hand in the median nerve distribution.  She is status post right endoscopic carpal tunnel release and ulnar nerve transposition by Dr. Stevenson back in November.  She is not seen him since her postop follow-up.    Preop Questions 2/23/2022   1. Have you ever had a heart attack or stroke? No   2. Have you ever had surgery on your heart or blood vessels, such as a stent placement, a coronary artery bypass, or surgery on an artery in your head, neck, heart, or legs? No   3. Do you have chest pain with activity? No   4. Do you have a history of  heart failure? No   5. Do you currently have a cold, bronchitis or symptoms of other infection? No   6. Do you have a cough, shortness of breath, or wheezing? No   7. Do you or anyone in your family have previous history of blood clots? No   8. Do you or does anyone in your family have a serious bleeding  problem such as prolonged bleeding following surgeries or cuts? No   9. Have you ever had problems with anemia or been told to take iron pills? UNKNOWN -    10. Have you had any abnormal blood loss such as black, tarry or bloody stools, or abnormal vaginal bleeding? YES - has had abnormal vaginal bleeding   11. Have you ever had a blood transfusion? No   12. Are you willing to have a blood transfusion if it is medically needed before, during, or after your surgery? Yes   13. Have you or any of your relatives ever had problems with anesthesia? No   14. Do you have sleep apnea, excessive snoring or daytime drowsiness? No   15. Do you have any artifical heart valves or other implanted medical devices like a pacemaker, defibrillator, or continuous glucose monitor? No   16. Do you have artificial joints? No   17. Are you allergic to latex? No       Health Care Directive:  Patient does not have a Health Care Directive or Living Will: Discussed advance care planning with patient; however, patient declined at this time.    Preoperative Review of :   reviewed - controlled substances reflected in medication list.  PDMP Review       Value Time User    State PDMP site checked  Yes 2/23/2022  2:35 PM Abiel Brambila MD           Status of Chronic Conditions:  RENAL INSUFFICIENCY - Patient has a longstanding history of stage IIIa chronic renal insufficiency.       Review of Systems  Constitutional, neuro, ENT, endocrine, pulmonary, cardiac, gastrointestinal, genitourinary, musculoskeletal, integument and psychiatric systems are negative, except as otherwise noted.    Patient Active Problem List    Diagnosis Date Noted     Chronic kidney disease, stage 3 10/12/2021     Priority: Medium     Syncope 07/28/2020     Priority: Medium     UTI (urinary tract infection) 07/28/2020     Priority: Medium     Stenosis of left vertebral artery 07/28/2020     Priority: Medium     Hypercalcemia 02/22/2019     Priority: Medium     Vitamin  D deficiency 02/22/2010     Priority: Medium     Dyspepsia and disorder of function of stomach 11/10/2003     Priority: Medium     Acid peptic disease  IMO Update 10/11       Breast neoplasm 09/05/2003     Priority: Medium     Ill-defined nodular density in upper outer aspect of right breast on U/S and mammogram today; steriotactic BX scheduled in [place]  IMO Update 10/11       Convulsions (H) 11/18/2002     Priority: Medium     Seizure disorder, Petit small seizures, curently stable  IMO Update 10/11       Essential hypertension 11/18/2002     Priority: Medium     IMO Update       Bundle branch block 07/22/2002     Priority: Medium     Noted on EKG 06/17/03  IMO Update 10/11        History reviewed. No pertinent past medical history.  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 Weeks at [facility]     RELEASE TRIGGER FINGER      right thumb     TRANSPOSITION ULNAR NERVE (ELBOW) Right 11/12/2021    Procedure: TRANSPOSITION, NERVE, ULNAR;  Surgeon: Bandar Stevenson MD;  Location:  OR     Current Outpatient Medications   Medication Sig Dispense Refill     acetaminophen (TYLENOL) 650 MG CR tablet Take 1,300 mg by mouth nightly as needed        aspirin (ASA) 81 MG EC tablet Take 1 tablet (81 mg) by mouth daily 90 tablet 3     atorvastatin (LIPITOR) 20 MG tablet Take 1 tablet (20 mg) by mouth daily 90 tablet 3     hydrochlorothiazide (HYDRODIURIL) 12.5 MG tablet Take 1 tablet (12.5 mg) by mouth daily 90 tablet  "11     lisinopril (ZESTRIL) 20 MG tablet Take 1 tablet (20 mg) by mouth daily 90 tablet 4     Lutein 6 MG TABS Take 6 mg by mouth       vitamin D3 (CHOLECALCIFEROL) 50 mcg (2000 units) tablet Take 2,000 Units by mouth       estradiol (ESTRACE) 0.1 MG/GM vaginal cream Place 2 g vaginally twice a week (Patient not taking: Reported on 2/23/2022) 42.5 g 11       Allergies   Allergen Reactions     Sulfa Drugs Unknown        Social History     Tobacco Use     Smoking status: Never Smoker     Smokeless tobacco: Never Used   Substance Use Topics     Alcohol use: Not Currently     History reviewed. No pertinent family history.  History   Drug Use Unknown         Objective     /80   Pulse 72   Temp 98.1  F (36.7  C) (Temporal)   Resp 16   Ht 1.537 m (5' 0.5\")   Wt 64.4 kg (142 lb)   LMP  (LMP Unknown)   SpO2 100%   Breastfeeding No   BMI 27.28 kg/m      Physical Exam  General Appearance: Pleasant, alert, appropriate appearance for age. No acute distress  Head Exam: Normal. Normocephalic, atraumatic.  Eye Exam: PERRLA, EOMI, conjunctivae, sclerae normal.  Ear Exam: Normal TM's bilaterally. Normal auditory canals and external ears. Non-tender.  Nose Exam: Normal external nose, mucus membranes, and septum.  OroPharynx Exam:  Dental hygiene adequate. Normal buccal mucosa. Normal pharynx.  Neck Exam:  Supple, no masses or nodes. No bruits  Thyroid Exam: No nodules or enlargement.  Chest/Respiratory Exam: Normal chest wall and respirations. Clear to auscultation.  Cardiovascular Exam: Regular rate and rhythm. S1, S2, no murmur, click, gallop, or rubs.  Gastrointestinal Exam: Soft, non-tender, no masses or organomegaly.  Genitourinary exam: Per Dr. Floyd  Lymphatic Exam: Non-palpable nodes in neck,clavicular regions.  Musculoskeletal Exam: Well-healed right ulnar nerve transposition elbow scar.  Well-healed endoscopic carpal tunnel scar right volar wrist  Foot Exam: Left and right foot: good pedal pulses  Skin: no " rash or abnormalities  Neurologic Exam:  normal gross motor, tone coordination and no tremor.  Psychiatric Exam: Alert and oriented - appropriate affect.     Recent Labs   Lab Test 02/23/22  1055 09/07/21  1226 09/07/21  1225 08/20/21  1045 07/29/20  0500 07/28/20  1050   HGB 12.8 13.8  --  13.4   < > 13.3    218  --  191   < > 176   INR  --   --   --   --   --  0.97   NA  --   --  137 137   < > 138   POTASSIUM  --   --  5.2* 4.4   < > 4.3   CR  --   --  1.53* 1.36*   < > 0.95    < > = values in this interval not displayed.        Diagnostics:  Recent Results (from the past 24 hour(s))   Comprehensive metabolic panel    Collection Time: 02/23/22 10:55 AM   Result Value Ref [place]    Sodium 138 134 - 144 mmol/L    Potassium 4.0 3.5 - 5.1 mmol/L    Chloride 100 98 - 107 mmol/L    Carbon Dioxide (CO2) 31 21 - 31 mmol/L    Anion Gap 7 3 - 14 mmol/L    Urea Nitrogen 31 (H) 7 - 25 mg/dL    Creatinine 1.22 (H) 0.60 - 1.20 mg/dL    Calcium 10.6 (H) 8.6 - 10.3 mg/dL    Glucose 87 70 - 105 mg/dL    Alkaline Phosphatase 46 34 - 104 U/L    AST 14 13 - 39 U/L    ALT 11 7 - 52 U/L    Protein Total 6.2 (L) 6.4 - 8.9 g/dL    Albumin 4.3 3.5 - 5.7 g/dL    Bilirubin Total 0.8 0.3 - 1.0 mg/dL    GFR Estimate 43 (L) >60 mL/min/1.73m2   CBC with platelets and differential    Collection Time: 02/23/22 10:55 AM   Result Value Ref Range    WBC Count 6.4 4.0 - 11.0 10e3/uL    RBC Count 4.21 3.80 - 5.20 10e6/uL    Hemoglobin 12.8 11.7 - 15.7 g/dL    Hematocrit 38.7 35.0 - 47.0 %    MCV 92 78 - 100 fL    MCH 30.4 26.5 - 33.0 pg    MCHC 33.1 31.5 - 36.5 g/dL    RDW 12.7 10.0 - 15.0 %    Platelet Count 247 150 - 450 10e3/uL    % Neutrophils 66 %    % Lymphocytes 23 %    % Monocytes 9 %    % Eosinophils 1 %    % Basophils 0 %    % Immature Granulocytes 1 %    NRBCs per 100 WBC 0 <1 /100    Absolute Neutrophils 4.3 1.6 - 8.3 10e3/uL    Absolute Lymphocytes 1.4 0.8 - 5.3 10e3/uL    Absolute Monocytes 0.6 0.0 - 1.3 10e3/uL    Absolute  Eosinophils 0.1 0.0 - 0.7 10e3/uL    Absolute Basophils 0.0 0.0 - 0.2 10e3/uL    Absolute Immature Granulocytes 0.0 <=0.4 10e3/uL    Absolute NRBCs 0.0 10e3/uL   UA reflex to Microscopic and Culture    Collection Time: 02/23/22 10:59 AM    Specimen: Urine, Clean Catch   Result Value Ref Range    Color Urine Yellow Colorless, Straw, Light Yellow, Yellow    Appearance Urine Slightly Cloudy (A) Clear    Glucose Urine Negative Negative mg/dL    Bilirubin Urine Negative Negative    Ketones Urine Negative Negative mg/dL    Specific Gravity Urine 1.017 1.000 - 1.030    Blood Urine Negative Negative    pH Urine 6.5 5.0 - 9.0    Protein Albumin Urine 10  (A) Negative mg/dL    Urobilinogen Urine Normal Normal, 2.0 mg/dL    Nitrite Urine Negative Negative    Leukocyte Esterase Urine Moderate (A) Negative    Bacteria Urine Many (A) None Seen /HPF    WBC Clumps Urine Present (A) None Seen /HPF    Mucus Urine Present (A) None Seen /LPF    RBC Urine 1 <=2 /HPF    WBC Urine 22 (H) <=5 /HPF    Squamous Epithelials Urine 4 (H) <=1 /HPF   EKG 12-lead, tracing only (Same Day)    Collection Time: 02/23/22 12:00 PM   Result Value Ref Range    Systolic Blood Pressure  mmHg    Diastolic Blood Pressure  mmHg    Ventricular Rate 72 BPM    Atrial Rate 72 BPM    ID Interval 136 ms    QRS Duration 134 ms     ms    QTc 479 ms    P Axis 4 degrees    R AXIS 76 degrees    T Axis -44 degrees    Interpretation ECG       Sinus rhythm with occasional Premature ventricular complexes  Non-specific intra-ventricular conduction block  T wave abnormality, consider inferolateral ischemia  Abnormal ECG  When compared with ECG of 28-JUL-2020 10:47,  Premature ventricular complexes are now Present  Questionable change in QRS axis  ST now depressed in Inferior leads  T wave inversion now evident in Inferior leads        EKG: Normal Sinus Rhythm, Intraventricular conduction delay is noted, occasional PVC noted, uniforyan Menchaca was seen today for pre-op  exam.    Diagnoses and all orders for this visit:    Preop general physical exam  -     CBC with Platelets & Differential; Future  -     Comprehensive metabolic panel; Future  -     EKG 12-lead, tracing only (Same Day)  -     UA reflex to Microscopic and Culture; Future  -     CBC with Platelets & Differential  -     Comprehensive metabolic panel  -     UA reflex to Microscopic and Culture  -     Urine Culture    Postmenopausal bleeding  -     CBC with Platelets & Differential; Future  -     CBC with Platelets & Differential    Essential hypertension  -     Comprehensive metabolic panel; Future  -     EKG 12-lead, tracing only (Same Day)  -     Comprehensive metabolic panel    Stage 3b chronic kidney disease (H)  -     CBC with Platelets & Differential; Future  -     Comprehensive metabolic panel; Future  -     UA reflex to Microscopic and Culture; Future  -     CBC with Platelets & Differential  -     Comprehensive metabolic panel  -     UA reflex to Microscopic and Culture  -     Urine Culture    Dysuria  -     UA reflex to Microscopic and Culture; Future  -     UA reflex to Microscopic and Culture  -     Urine Culture    Numbness and tingling in right hand  -     Orthopedic  Referral; Future       We will send her back to Dr. Stevenson for a follow-up.    She is cleared to go ahead with planned hysteroscopy and diagnostic D&C.  She does have some asymptomatic bacteriuria that may or may not be significant.  If a single pathogen grows, we will then empirically treat preoperatively to reduce risk of perioperative urinary tract infection.  She is noticing some vaginal irritation at times that is bothered by voiding.  She last Dr. Floyd about this as well.    Stop aspirin and NSAIDs 1 week prior to surgery.  OK to take regular medications with a sip of water the  morning of surgery.       Revised Cardiac Risk Index (RCRI):  The patient has the following serious cardiovascular risks for perioperative  complications:   - No serious cardiac risks = 0 points     RCRI Interpretation: 0 points: Class I (very low risk - 0.4% complication rate)           Signed Electronically by: Abiel Brambila MD  Copy of this evaluation report is provided to requesting physician.

## 2022-02-23 NOTE — H&P (VIEW-ONLY)
[facility] AND \[facility]\""  [address]  [place] [address]  Phone: [phone]  Fax: [phone]  Primary Provider: Leida Brambila  Pre-op Performing Provider: LEIDA BRAMBILA      PREOPERATIVE EVALUATION:  Today's date: 2/23/2022    Bernarda Ramírez is a 87 year old female who presents for a preoperative evaluation.    Surgical Information:  Surgery/Procedure: Hysteroscopy and diagnostic D&C  Surgery Location: [facility]  Surgeon: Dr Floyd  Surgery Date: 03/09/2022  Time of Surgery: TBD  Where patient plans to recover: At home with family  Fax number for surgical facility: Note does not need to be faxed, will be available electronically in Epic.    Type of Anesthesia Anticipated: to be determined        Subjective     HPI related to upcoming procedure: Hysteroscopy and diagnostic D&C.    She has postmenopausal bleeding last month.  She saw Dr. Floyd and he did pap and EMB with benign results, but has thickened endometrium on US, so is scheduled for the above procedure.     She still having some numbness and tingling in the fingers under right hand in the median nerve distribution.  She is status post right endoscopic carpal tunnel release and ulnar nerve transposition by Dr. Stevenson back in November.  She is not seen him since her postop follow-up.    Preop Questions 2/23/2022   1. Have you ever had a heart attack or stroke? No   2. Have you ever had surgery on your heart or blood vessels, such as a stent placement, a coronary artery bypass, or surgery on an artery in your head, neck, heart, or legs? No   3. Do you have chest pain with activity? No   4. Do you have a history of  heart failure? No   5. Do you currently have a cold, bronchitis or symptoms of other infection? No   6. Do you have a cough, shortness of breath, or wheezing? No   7. Do you or anyone in your family have previous history of blood clots? No   8. Do you or does anyone in your family have a serious bleeding  problem such as prolonged bleeding following surgeries or cuts? No   9. Have you ever had problems with anemia or been told to take iron pills? UNKNOWN -    10. Have you had any abnormal blood loss such as black, tarry or bloody stools, or abnormal vaginal bleeding? YES - has had abnormal vaginal bleeding   11. Have you ever had a blood transfusion? No   12. Are you willing to have a blood transfusion if it is medically needed before, during, or after your surgery? Yes   13. Have you or any of your relatives ever had problems with anesthesia? No   14. Do you have sleep apnea, excessive snoring or daytime drowsiness? No   15. Do you have any artifical heart valves or other implanted medical devices like a pacemaker, defibrillator, or continuous glucose monitor? No   16. Do you have artificial joints? No   17. Are you allergic to latex? No       Health Care Directive:  Patient does not have a Health Care Directive or Living Will: Discussed advance care planning with patient; however, patient declined at this time.    Preoperative Review of :   reviewed - controlled substances reflected in medication list.  PDMP Review       Value Time User    State PDMP site checked  Yes 2/23/2022  2:35 PM Abiel Brambila MD           Status of Chronic Conditions:  RENAL INSUFFICIENCY - Patient has a longstanding history of stage IIIa chronic renal insufficiency.       Review of Systems  Constitutional, neuro, ENT, endocrine, pulmonary, cardiac, gastrointestinal, genitourinary, musculoskeletal, integument and psychiatric systems are negative, except as otherwise noted.    Patient Active Problem List    Diagnosis Date Noted     Chronic kidney disease, stage 3 10/12/2021     Priority: Medium     Syncope 07/28/2020     Priority: Medium     UTI (urinary tract infection) 07/28/2020     Priority: Medium     Stenosis of left vertebral artery 07/28/2020     Priority: Medium     Hypercalcemia 02/22/2019     Priority: Medium     Vitamin  D deficiency 02/22/2010     Priority: Medium     Dyspepsia and disorder of function of stomach 11/10/2003     Priority: Medium     Acid peptic disease  IMO Update 10/11       Breast neoplasm 09/05/2003     Priority: Medium     Ill-defined nodular density in upper outer aspect of right breast on U/S and mammogram today; steriotactic BX scheduled in [place]  IMO Update 10/11       Convulsions (H) 11/18/2002     Priority: Medium     Seizure disorder, Petit small seizures, curently stable  IMO Update 10/11       Essential hypertension 11/18/2002     Priority: Medium     IMO Update       Bundle branch block 07/22/2002     Priority: Medium     Noted on EKG 06/17/03  IMO Update 10/11        History reviewed. No pertinent past medical history.  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 Weeks [facility]     RELEASE TRIGGER FINGER      right thumb     TRANSPOSITION ULNAR NERVE (ELBOW) Right 11/12/2021    Procedure: TRANSPOSITION, NERVE, ULNAR;  Surgeon: Bandar Stevenson MD;  Location:  OR     Current Outpatient Medications   Medication Sig Dispense Refill     acetaminophen (TYLENOL) 650 MG CR tablet Take 1,300 mg by mouth nightly as needed        aspirin (ASA) 81 MG EC tablet Take 1 tablet (81 mg) by mouth daily 90 tablet 3     atorvastatin (LIPITOR) 20 MG tablet Take 1 tablet (20 mg) by mouth daily 90 tablet 3     hydrochlorothiazide (HYDRODIURIL) 12.5 MG tablet Take 1 tablet (12.5 mg) by mouth daily 90 tablet  "11     lisinopril (ZESTRIL) 20 MG tablet Take 1 tablet (20 mg) by mouth daily 90 tablet 4     Lutein 6 MG TABS Take 6 mg by mouth       vitamin D3 (CHOLECALCIFEROL) 50 mcg (2000 units) tablet Take 2,000 Units by mouth       estradiol (ESTRACE) 0.1 MG/GM vaginal cream Place 2 g vaginally twice a week (Patient not taking: Reported on 2/23/2022) 42.5 g 11       Allergies   Allergen Reactions     Sulfa Drugs Unknown        Social History     Tobacco Use     Smoking status: Never Smoker     Smokeless tobacco: Never Used   Substance Use Topics     Alcohol use: Not Currently     History reviewed. No pertinent family history.  History   Drug Use Unknown         Objective     /80   Pulse 72   Temp 98.1  F (36.7  C) (Temporal)   Resp 16   Ht 1.537 m (5' 0.5\")   Wt 64.4 kg (142 lb)   LMP  (LMP Unknown)   SpO2 100%   Breastfeeding No   BMI 27.28 kg/m      Physical Exam  General Appearance: Pleasant, alert, appropriate appearance for age. No acute distress  Head Exam: Normal. Normocephalic, atraumatic.  Eye Exam: PERRLA, EOMI, conjunctivae, sclerae normal.  Ear Exam: Normal TM's bilaterally. Normal auditory canals and external ears. Non-tender.  Nose Exam: Normal external nose, mucus membranes, and septum.  OroPharynx Exam:  Dental hygiene adequate. Normal buccal mucosa. Normal pharynx.  Neck Exam:  Supple, no masses or nodes. No bruits  Thyroid Exam: No nodules or enlargement.  Chest/Respiratory Exam: Normal chest wall and respirations. Clear to auscultation.  Cardiovascular Exam: Regular rate and rhythm. S1, S2, no murmur, click, gallop, or rubs.  Gastrointestinal Exam: Soft, non-tender, no masses or organomegaly.  Genitourinary exam: Per Dr. Floyd  Lymphatic Exam: Non-palpable nodes in neck,clavicular regions.  Musculoskeletal Exam: Well-healed right ulnar nerve transposition elbow scar.  Well-healed endoscopic carpal tunnel scar right volar wrist  Foot Exam: Left and right foot: good pedal pulses  Skin: no " rash or abnormalities  Neurologic Exam:  normal gross motor, tone coordination and no tremor.  Psychiatric Exam: Alert and oriented - appropriate affect.     Recent Labs   Lab Test 02/23/22  1055 09/07/21  1226 09/07/21  1225 08/20/21  1045 07/29/20  0500 07/28/20  1050   HGB 12.8 13.8  --  13.4   < > 13.3    218  --  191   < > 176   INR  --   --   --   --   --  0.97   NA  --   --  137 137   < > 138   POTASSIUM  --   --  5.2* 4.4   < > 4.3   CR  --   --  1.53* 1.36*   < > 0.95    < > = values in this interval not displayed.        Diagnostics:  Recent Results (from the past 24 hour(s))   Comprehensive metabolic panel    Collection Time: 02/23/22 10:55 AM   Result Value Ref Range    Sodium 138 134 - 144 mmol/L    Potassium 4.0 3.5 - 5.1 mmol/L    Chloride 100 98 - 107 mmol/L    Carbon Dioxide (CO2) 31 21 - 31 mmol/L    Anion Gap 7 3 - 14 mmol/L    Urea Nitrogen 31 (H) 7 - 25 mg/dL    Creatinine 1.22 (H) 0.60 - 1.20 mg/dL    Calcium 10.6 (H) 8.6 - 10.3 mg/dL    Glucose 87 70 - 105 mg/dL    Alkaline Phosphatase 46 34 - 104 U/L    AST 14 13 - 39 U/L    ALT 11 7 - 52 U/L    Protein Total 6.2 (L) 6.4 - 8.9 g/dL    Albumin 4.3 3.5 - 5.7 g/dL    Bilirubin Total 0.8 0.3 - 1.0 mg/dL    GFR Estimate 43 (L) >60 mL/min/1.73m2   CBC with platelets and differential    Collection Time: 02/23/22 10:55 AM   Result Value Ref Range    WBC Count 6.4 4.0 - 11.0 10e3/uL    RBC Count 4.21 3.80 - 5.20 10e6/uL    Hemoglobin 12.8 11.7 - 15.7 g/dL    Hematocrit 38.7 35.0 - 47.0 %    MCV 92 78 - 100 fL    MCH 30.4 26.5 - 33.0 pg    MCHC 33.1 31.5 - 36.5 g/dL    RDW 12.7 10.0 - 15.0 %    Platelet Count 247 150 - 450 10e3/uL    % Neutrophils 66 %    % Lymphocytes 23 %    % Monocytes 9 %    % Eosinophils 1 %    % Basophils 0 %    % Immature Granulocytes 1 %    NRBCs per 100 WBC 0 <1 /100    Absolute Neutrophils 4.3 1.6 - 8.3 10e3/uL    Absolute Lymphocytes 1.4 0.8 - 5.3 10e3/uL    Absolute Monocytes 0.6 0.0 - 1.3 10e3/uL    Absolute  Eosinophils 0.1 0.0 - 0.7 10e3/uL    Absolute Basophils 0.0 0.0 - 0.2 10e3/uL    Absolute Immature Granulocytes 0.0 <=0.4 10e3/uL    Absolute NRBCs 0.0 10e3/uL   UA reflex to Microscopic and Culture    Collection Time: 02/23/22 10:59 AM    Specimen: Urine, Clean Catch   Result Value Ref Range    Color Urine Yellow Colorless, Straw, Light Yellow, Yellow    Appearance Urine Slightly Cloudy (A) Clear    Glucose Urine Negative Negative mg/dL    Bilirubin Urine Negative Negative    Ketones Urine Negative Negative mg/dL    Specific Gravity Urine 1.017 1.000 - 1.030    Blood Urine Negative Negative    pH Urine 6.5 5.0 - 9.0    Protein Albumin Urine 10  (A) Negative mg/dL    Urobilinogen Urine Normal Normal, 2.0 mg/dL    Nitrite Urine Negative Negative    Leukocyte Esterase Urine Moderate (A) Negative    Bacteria Urine Many (A) None Seen /HPF    WBC Clumps Urine Present (A) None Seen /HPF    Mucus Urine Present (A) None Seen /LPF    RBC Urine 1 <=2 /HPF    WBC Urine 22 (H) <=5 /HPF    Squamous Epithelials Urine 4 (H) <=1 /HPF   EKG 12-lead, tracing only (Same Day)    Collection Time: 02/23/22 12:00 PM   Result Value Ref Range    Systolic Blood Pressure  mmHg    Diastolic Blood Pressure  mmHg    Ventricular Rate 72 BPM    Atrial Rate 72 BPM    NY Interval 136 ms    QRS Duration 134 ms     ms    QTc 479 ms    P Axis 4 degrees    R AXIS 76 degrees    T Axis -44 degrees    Interpretation ECG       Sinus rhythm with occasional Premature ventricular complexes  Non-specific intra-ventricular conduction block  T wave abnormality, consider inferolateral ischemia  Abnormal ECG  When compared with ECG of 28-JUL-2020 10:47,  Premature ventricular complexes are now Present  Questionable change in QRS axis  ST now depressed in Inferior leads  T wave inversion now evident in Inferior leads        EKG: Normal Sinus Rhythm, Intraventricular conduction delay is noted, occasional PVC noted, uniforyan Menchaca was seen today for pre-op  exam.    Diagnoses and all orders for this visit:    Preop general physical exam  -     CBC with Platelets & Differential; Future  -     Comprehensive metabolic panel; Future  -     EKG 12-lead, tracing only (Same Day)  -     UA reflex to Microscopic and Culture; Future  -     CBC with Platelets & Differential  -     Comprehensive metabolic panel  -     UA reflex to Microscopic and Culture  -     Urine Culture    Postmenopausal bleeding  -     CBC with Platelets & Differential; Future  -     CBC with Platelets & Differential    Essential hypertension  -     Comprehensive metabolic panel; Future  -     EKG 12-lead, tracing only (Same Day)  -     Comprehensive metabolic panel    Stage 3b chronic kidney disease (H)  -     CBC with Platelets & Differential; Future  -     Comprehensive metabolic panel; Future  -     UA reflex to Microscopic and Culture; Future  -     CBC with Platelets & Differential  -     Comprehensive metabolic panel  -     UA reflex to Microscopic and Culture  -     Urine Culture    Dysuria  -     UA reflex to Microscopic and Culture; Future  -     UA reflex to Microscopic and Culture  -     Urine Culture    Numbness and tingling in right hand  -     Orthopedic  Referral; Future       We will send her back to Dr. Stevenson for a follow-up.    She is cleared to go ahead with planned hysteroscopy and diagnostic D&C.  She does have some asymptomatic bacteriuria that may or may not be significant.  If a single pathogen grows, we will then empirically treat preoperatively to reduce risk of perioperative urinary tract infection.  She is noticing some vaginal irritation at times that is bothered by voiding.  She last Dr. Floyd about this as well.    Stop aspirin and NSAIDs 1 week prior to surgery.  OK to take regular medications with a sip of water the  morning of surgery.       Revised Cardiac Risk Index (RCRI):  The patient has the following serious cardiovascular risks for perioperative  complications:   - No serious cardiac risks = 0 points     RCRI Interpretation: 0 points: Class I (very low risk - 0.4% complication rate)           Signed Electronically by: Abiel Brambila MD  Copy of this evaluation report is provided to requesting physician.

## 2022-02-23 NOTE — PATIENT INSTRUCTIONS
Preparing for Your Surgery  Getting started  A nurse will call you to review your health history and instructions. They will give you an arrival time based on your scheduled surgery time. Please be ready to share:    Your doctor's clinic name and phone number    Your medical, surgical and anesthesia history    A list of allergies and sensitivities    A list of medicines, including herbal treatments and over-the-counter drugs    Whether the patient has a legal guardian (ask how to send us the papers in advance)  Please tell us if you're pregnant--or if there's any chance you might be pregnant. Some surgeries may injure a fetus (unborn baby), so they require a pregnancy test. Surgeries that are safe for a fetus don't always need a test, and you can choose whether to have one.   If you have a child who's having surgery, please ask for a copy of Preparing for Your Child's Surgery.    Preparing for surgery    Within 30 days of surgery: Have a pre-op exam (sometimes called an H&P, or History and Physical). This can be done at a clinic or pre-operative center.  ? If you're having a , you may not need this exam. Talk to your care team.    At your pre-op exam, talk to your care team about all medicines you take. If you need to stop any medicines before surgery, ask when to start taking them again.  ? We do this for your safety. Many medicines can make you bleed too much during surgery. Some change how well surgery (anesthesia) drugs work.    Call your insurance company to let them know you're having surgery. (If you don't have insurance, call [phone].)    Call your clinic if there's any change in your health. This includes signs of a cold or flu (sore throat, runny nose, cough, rash, fever). It also includes a scrape or scratch near the surgery site.    If you have questions on the day of surgery, call your hospital or surgery center.  COVID testing  You may need to be tested for COVID-19 before having  surgery. If so, your surgical team will give you instructions for scheduling this test, separate from your preoperative history and physical.  Eating and drinking guidelines  For your safety: Unless your surgeon tells you otherwise, follow the guidelines below.    Eat and drink as usual until 8 hours before surgery. After that, no food or milk.    Drink clear liquids until 2 hours before surgery. These are liquids you can see through, like water, Gatorade and Propel Water. You may also have black coffee and tea (no cream or milk).    Nothing by mouth within 2 hours of surgery. This includes gum, candy and breath mints.    If you drink alcohol: Stop drinking it the night before surgery.    If your care team tells you to take medicine on the morning of surgery, it's okay to take it with a sip of water.  Preventing infection    Shower or bathe the night before and morning of your surgery. Follow the instructions your clinic gave you. (If no instructions, use regular soap.)    Don't shave or clip hair near your surgery site. We'll remove the hair if needed.    Don't smoke or vape the morning of surgery. You may chew nicotine gum up to 2 hours before surgery. A nicotine patch is okay.  ? Note: Some surgeries require you to completely quit smoking and nicotine. Check with your surgeon.    Your care team will make every effort to keep you safe from infection. We will:  ? Clean our hands often with soap and water (or an alcohol-based hand rub).  ? Clean the skin at your surgery site with a special soap that kills germs.  ? Give you a special gown to keep you warm. (Cold raises the risk of infection.)  ? Wear special hair covers, masks, gowns and gloves during surgery.  ? Give antibiotic medicine, if prescribed. Not all surgeries need antibiotics.  What to bring on the day of surgery    Photo ID and insurance card    Copy of your health care directive, if you have one    Glasses and hearing aides (bring cases)  ? You can't  wear contacts during surgery    Inhaler and eye drops, if you use them (tell us about these when you arrive)    CPAP machine or breathing device, if you use them    A few personal items, if spending the night    If you have . . .  ? A pacemaker, ICD (cardiac defibrillator) or other implant: Bring the ID card.  ? An implanted stimulator: Bring the remote control.  ? A legal guardian: Bring a copy of the certified (court-stamped) guardianship papers.  Please remove any jewelry, including body piercings. Leave jewelry and other valuables at home.  If you're going home the day of surgery    You must have a responsible adult drive you home. They should stay with you overnight as well.    If you don't have someone to stay with you, and you aren't safe to go home alone, we may keep you overnight. Insurance often won't pay for this.  After surgery  If it's hard to control your pain or you need more pain medicine, please call your surgeon's office.  Questions?   If you have any questions for your care team, list them here: _________________________________________________________________________________________________________________________________________________________________________ ____________________________________ ____________________________________ ____________________________________  For informational purposes only. Not to replace the advice of your health care provider. Copyright   2003, 2019 [facility]. All rights reserved. Clinically reviewed by Bernarda Duffy MD. [employer] [id number] - REV 07/21.

## 2022-02-24 DIAGNOSIS — N39.0 URINARY TRACT INFECTION WITHOUT HEMATURIA, SITE UNSPECIFIED: Primary | ICD-10-CM

## 2022-02-24 RX ORDER — CEPHALEXIN 500 MG/1
500 CAPSULE ORAL 3 TIMES DAILY
Qty: 15 CAPSULE | Refills: 0 | Status: SHIPPED | OUTPATIENT
Start: 2022-02-24 | End: 2022-03-01

## 2022-02-25 LAB — BACTERIA UR CULT: ABNORMAL

## 2022-02-27 LAB
ATRIAL RATE - MUSE: 72 BPM
DIASTOLIC BLOOD PRESSURE - MUSE: NORMAL MMHG
INTERPRETATION ECG - MUSE: NORMAL
P AXIS - MUSE: 4 DEGREES
PR INTERVAL - MUSE: 136 MS
QRS DURATION - MUSE: 134 MS
QT - MUSE: 438 MS
QTC - MUSE: 479 MS
R AXIS - MUSE: 76 DEGREES
SYSTOLIC BLOOD PRESSURE - MUSE: NORMAL MMHG
T AXIS - MUSE: -44 DEGREES
VENTRICULAR RATE- MUSE: 72 BPM

## 2022-03-03 ENCOUNTER — LAB (OUTPATIENT)
Dept: LAB | Facility: OTHER | Age: 87
End: 2022-03-03
Attending: FAMILY MEDICINE
Payer: MEDICARE

## 2022-03-03 DIAGNOSIS — N39.0 URINARY TRACT INFECTION WITHOUT HEMATURIA, SITE UNSPECIFIED: ICD-10-CM

## 2022-03-03 LAB
ALBUMIN UR-MCNC: NEGATIVE MG/DL
APPEARANCE UR: CLEAR
BILIRUB UR QL STRIP: NEGATIVE
COLOR UR AUTO: NORMAL
GLUCOSE UR STRIP-MCNC: NEGATIVE MG/DL
HGB UR QL STRIP: NEGATIVE
KETONES UR STRIP-MCNC: NEGATIVE MG/DL
LEUKOCYTE ESTERASE UR QL STRIP: NEGATIVE
NITRATE UR QL: NEGATIVE
PH UR STRIP: 6 [PH] (ref 5–9)
SP GR UR STRIP: 1.01 (ref 1–1.03)
UROBILINOGEN UR STRIP-MCNC: NORMAL MG/DL

## 2022-03-03 PROCEDURE — 81003 URINALYSIS AUTO W/O SCOPE: CPT | Mod: ZL

## 2022-03-07 ENCOUNTER — ALLIED HEALTH/NURSE VISIT (OUTPATIENT)
Dept: FAMILY MEDICINE | Facility: OTHER | Age: 87
End: 2022-03-07
Attending: FAMILY MEDICINE
Payer: MEDICARE

## 2022-03-07 DIAGNOSIS — Z20.822 COVID-19 RULED OUT: Primary | ICD-10-CM

## 2022-03-07 PROCEDURE — C9803 HOPD COVID-19 SPEC COLLECT: HCPCS

## 2022-03-07 PROCEDURE — U0005 INFEC AGEN DETEC AMPLI PROBE: HCPCS | Mod: ZL

## 2022-03-08 ENCOUNTER — ANESTHESIA EVENT (OUTPATIENT)
Dept: SURGERY | Facility: OTHER | Age: 87
End: 2022-03-08
Payer: MEDICARE

## 2022-03-08 LAB — SARS-COV-2 RNA RESP QL NAA+PROBE: NEGATIVE

## 2022-03-09 ENCOUNTER — HOSPITAL ENCOUNTER (OUTPATIENT)
Facility: OTHER | Age: 87
Discharge: HOME | End: 2022-03-09
Attending: OBSTETRICS & GYNECOLOGY | Admitting: OBSTETRICS & GYNECOLOGY
Payer: MEDICARE

## 2022-03-09 ENCOUNTER — ANESTHESIA (OUTPATIENT)
Dept: SURGERY | Facility: OTHER | Age: 87
End: 2022-03-09
Payer: MEDICARE

## 2022-03-09 VITALS
WEIGHT: 142 LBS | SYSTOLIC BLOOD PRESSURE: 149 MMHG | TEMPERATURE: 98.3 F | HEART RATE: 70 BPM | OXYGEN SATURATION: 97 % | BODY MASS INDEX: 27.88 KG/M2 | RESPIRATION RATE: 10 BRPM | HEIGHT: 60 IN | DIASTOLIC BLOOD PRESSURE: 75 MMHG

## 2022-03-09 DIAGNOSIS — N95.0 POSTMENOPAUSAL BLEEDING: ICD-10-CM

## 2022-03-09 PROCEDURE — 250N000013 HC RX MED GY IP 250 OP 250 PS 637: Performed by: OBSTETRICS & GYNECOLOGY

## 2022-03-09 PROCEDURE — 370N000017 HC ANESTHESIA TECHNICAL FEE, PER MIN: Performed by: OBSTETRICS & GYNECOLOGY

## 2022-03-09 PROCEDURE — 250N000009 HC RX 250: Performed by: NURSE ANESTHETIST, CERTIFIED REGISTERED

## 2022-03-09 PROCEDURE — 272N000001 HC OR GENERAL SUPPLY STERILE: Performed by: OBSTETRICS & GYNECOLOGY

## 2022-03-09 PROCEDURE — 360N000076 HC SURGERY LEVEL 3, PER MIN: Performed by: OBSTETRICS & GYNECOLOGY

## 2022-03-09 PROCEDURE — 999N000141 HC STATISTIC PRE-PROCEDURE NURSING ASSESSMENT: Performed by: OBSTETRICS & GYNECOLOGY

## 2022-03-09 PROCEDURE — 258N000003 HC RX IP 258 OP 636: Performed by: NURSE ANESTHETIST, CERTIFIED REGISTERED

## 2022-03-09 PROCEDURE — 58558 HYSTEROSCOPY BIOPSY: CPT | Performed by: OBSTETRICS & GYNECOLOGY

## 2022-03-09 PROCEDURE — 88305 TISSUE EXAM BY PATHOLOGIST: CPT

## 2022-03-09 PROCEDURE — 250N000011 HC RX IP 250 OP 636: Performed by: OBSTETRICS & GYNECOLOGY

## 2022-03-09 PROCEDURE — 250N000011 HC RX IP 250 OP 636: Performed by: NURSE ANESTHETIST, CERTIFIED REGISTERED

## 2022-03-09 PROCEDURE — 710N000012 HC RECOVERY PHASE 2, PER MINUTE: Performed by: OBSTETRICS & GYNECOLOGY

## 2022-03-09 PROCEDURE — 258N000001 HC RX 258: Performed by: OBSTETRICS & GYNECOLOGY

## 2022-03-09 RX ORDER — FENTANYL CITRATE 50 UG/ML
50 INJECTION, SOLUTION INTRAMUSCULAR; INTRAVENOUS EVERY 5 MIN PRN
Status: DISCONTINUED | OUTPATIENT
Start: 2022-03-09 | End: 2022-03-09 | Stop reason: HOSPADM

## 2022-03-09 RX ORDER — NALOXONE HYDROCHLORIDE 0.4 MG/ML
0.2 INJECTION, SOLUTION INTRAMUSCULAR; INTRAVENOUS; SUBCUTANEOUS
Status: DISCONTINUED | OUTPATIENT
Start: 2022-03-09 | End: 2022-03-09 | Stop reason: HOSPADM

## 2022-03-09 RX ORDER — NALOXONE HYDROCHLORIDE 0.4 MG/ML
0.4 INJECTION, SOLUTION INTRAMUSCULAR; INTRAVENOUS; SUBCUTANEOUS
Status: DISCONTINUED | OUTPATIENT
Start: 2022-03-09 | End: 2022-03-09 | Stop reason: HOSPADM

## 2022-03-09 RX ORDER — ACETAMINOPHEN 325 MG/1
975 TABLET ORAL ONCE
Status: COMPLETED | OUTPATIENT
Start: 2022-03-09 | End: 2022-03-09

## 2022-03-09 RX ORDER — FENTANYL CITRATE 50 UG/ML
25 INJECTION, SOLUTION INTRAMUSCULAR; INTRAVENOUS
Status: DISCONTINUED | OUTPATIENT
Start: 2022-03-09 | End: 2022-03-09 | Stop reason: HOSPADM

## 2022-03-09 RX ORDER — FENTANYL CITRATE 50 UG/ML
INJECTION, SOLUTION INTRAMUSCULAR; INTRAVENOUS PRN
Status: DISCONTINUED | OUTPATIENT
Start: 2022-03-09 | End: 2022-03-09

## 2022-03-09 RX ORDER — PROPOFOL 10 MG/ML
INJECTION, EMULSION INTRAVENOUS PRN
Status: DISCONTINUED | OUTPATIENT
Start: 2022-03-09 | End: 2022-03-09

## 2022-03-09 RX ORDER — ONDANSETRON 2 MG/ML
INJECTION INTRAMUSCULAR; INTRAVENOUS PRN
Status: DISCONTINUED | OUTPATIENT
Start: 2022-03-09 | End: 2022-03-09

## 2022-03-09 RX ORDER — ONDANSETRON 2 MG/ML
4 INJECTION INTRAMUSCULAR; INTRAVENOUS EVERY 30 MIN PRN
Status: DISCONTINUED | OUTPATIENT
Start: 2022-03-09 | End: 2022-03-09 | Stop reason: HOSPADM

## 2022-03-09 RX ORDER — PROPOFOL 10 MG/ML
INJECTION, EMULSION INTRAVENOUS CONTINUOUS PRN
Status: DISCONTINUED | OUTPATIENT
Start: 2022-03-09 | End: 2022-03-09

## 2022-03-09 RX ORDER — SODIUM CHLORIDE, SODIUM LACTATE, POTASSIUM CHLORIDE, CALCIUM CHLORIDE 600; 310; 30; 20 MG/100ML; MG/100ML; MG/100ML; MG/100ML
INJECTION, SOLUTION INTRAVENOUS CONTINUOUS
Status: DISCONTINUED | OUTPATIENT
Start: 2022-03-09 | End: 2022-03-09 | Stop reason: HOSPADM

## 2022-03-09 RX ORDER — OXYCODONE HYDROCHLORIDE 5 MG/1
5 TABLET ORAL EVERY 4 HOURS PRN
Status: DISCONTINUED | OUTPATIENT
Start: 2022-03-09 | End: 2022-03-09 | Stop reason: HOSPADM

## 2022-03-09 RX ORDER — LIDOCAINE HYDROCHLORIDE 20 MG/ML
INJECTION, SOLUTION INFILTRATION; PERINEURAL PRN
Status: DISCONTINUED | OUTPATIENT
Start: 2022-03-09 | End: 2022-03-09

## 2022-03-09 RX ORDER — HYDROMORPHONE HYDROCHLORIDE 1 MG/ML
0.4 INJECTION, SOLUTION INTRAMUSCULAR; INTRAVENOUS; SUBCUTANEOUS EVERY 5 MIN PRN
Status: DISCONTINUED | OUTPATIENT
Start: 2022-03-09 | End: 2022-03-09 | Stop reason: HOSPADM

## 2022-03-09 RX ORDER — LIDOCAINE 40 MG/G
CREAM TOPICAL
Status: DISCONTINUED | OUTPATIENT
Start: 2022-03-09 | End: 2022-03-09 | Stop reason: HOSPADM

## 2022-03-09 RX ORDER — BUPIVACAINE HYDROCHLORIDE 2.5 MG/ML
INJECTION, SOLUTION INFILTRATION; PERINEURAL PRN
Status: DISCONTINUED | OUTPATIENT
Start: 2022-03-09 | End: 2022-03-09 | Stop reason: HOSPADM

## 2022-03-09 RX ORDER — MEPERIDINE HYDROCHLORIDE 50 MG/ML
12.5 INJECTION INTRAMUSCULAR; INTRAVENOUS; SUBCUTANEOUS
Status: DISCONTINUED | OUTPATIENT
Start: 2022-03-09 | End: 2022-03-09 | Stop reason: HOSPADM

## 2022-03-09 RX ORDER — ONDANSETRON 4 MG/1
4 TABLET, ORALLY DISINTEGRATING ORAL EVERY 30 MIN PRN
Status: DISCONTINUED | OUTPATIENT
Start: 2022-03-09 | End: 2022-03-09 | Stop reason: HOSPADM

## 2022-03-09 RX ADMIN — PROPOFOL 30 MG: 10 INJECTION, EMULSION INTRAVENOUS at 12:00

## 2022-03-09 RX ADMIN — ACETAMINOPHEN 975 MG: 325 TABLET, FILM COATED ORAL at 11:27

## 2022-03-09 RX ADMIN — PROPOFOL 50 MCG/KG/MIN: 10 INJECTION, EMULSION INTRAVENOUS at 12:00

## 2022-03-09 RX ADMIN — SODIUM CHLORIDE, SODIUM LACTATE, POTASSIUM CHLORIDE, AND CALCIUM CHLORIDE 100 ML/HR: 600; 310; 30; 20 INJECTION, SOLUTION INTRAVENOUS at 11:48

## 2022-03-09 RX ADMIN — FENTANYL CITRATE 25 MCG: 50 INJECTION, SOLUTION INTRAMUSCULAR; INTRAVENOUS at 11:59

## 2022-03-09 RX ADMIN — LIDOCAINE HYDROCHLORIDE 0.1 ML: 10 INJECTION, SOLUTION EPIDURAL; INFILTRATION; INTRACAUDAL; PERINEURAL at 11:49

## 2022-03-09 RX ADMIN — LIDOCAINE HYDROCHLORIDE 60 MG: 20 INJECTION, SOLUTION INFILTRATION; PERINEURAL at 12:00

## 2022-03-09 RX ADMIN — ONDANSETRON HYDROCHLORIDE 4 MG: 2 SOLUTION INTRAMUSCULAR; INTRAVENOUS at 11:59

## 2022-03-09 ASSESSMENT — ENCOUNTER SYMPTOMS: DYSRHYTHMIAS: 1

## 2022-03-09 NOTE — ANESTHESIA PREPROCEDURE EVALUATION
Anesthesia Pre-Procedure Evaluation    Patient: Bernarda Ramírez   MRN: [id number] : 10/7/1934        Procedure : Procedure(s):  HYSTEROSCOPY, DIAGNOSTIC DILATION AND CURETTAGE          History reviewed. No pertinent past medical history.   Past Surgical History:   Procedure Laterality Date     APPENDECTOMY       AS ESOPHAGOSCOPY, DIAGNOSTIC  2000     bilateral cataract  2009     BREAST BIOPSY, CORE RT/LT  09/10/2003    [facility]     COLONOSCOPY  2003    normal     ECTOPIC PREGNANCY SURGERY       EGD with dilation  2005     ENDOSCOPIC RELEASE CARPAL TUNNEL Right 2021    Procedure: RELEASE, CARPAL TUNNEL, ENDOSCOPIC;  Surgeon: Bandar Stevenson MD;  Location: [facility] OR     LAPAROSCOPIC CHOLECYSTECTOMY  1994     Left foot spur surgery, Bora's deformity  2003    [facility]eks [facility]     RELEASE TRIGGER FINGER      right thumb     TRANSPOSITION ULNAR NERVE (ELBOW) Right 2021    Procedure: TRANSPOSITION, NERVE, ULNAR;  Surgeon: Bandar Stevenson MD;  Location: [facility] OR      Allergies   Allergen Reactions     Sulfa Drugs Unknown      Social History     Tobacco Use     Smoking status: Never Smoker     Smokeless tobacco: Never Used   Substance Use Topics     Alcohol use: Not Currently      Wt Readings from Last 1 Encounters:   22 64.4 kg (142 lb)        Anesthesia Evaluation   Pt has had prior anesthetic.         ROS/MED HX  ENT/Pulmonary:  - neg pulmonary ROS     Neurologic:     (+) dementia,     Cardiovascular: Comment: Bundle branch block    (+) hypertension--CAD ---STEIN. fainting (syncope). dysrhythmias, Other,     METS/Exercise Tolerance: 3 - Able to walk 1-2 blocks without stopping    Hematologic:  - neg hematologic  ROS     Musculoskeletal:   (+) arthritis,     GI/Hepatic:       Renal/Genitourinary:     (+) renal disease,     Endo:  - neg endo ROS     Psychiatric/Substance Use:  - neg psychiatric ROS     Infectious Disease:  - neg infectious disease ROS     Malignancy:  - neg  malignancy ROS     Other:            Physical Exam    Airway        Mallampati: II   TM distance: > 3 FB   Neck ROM: limited   Mouth opening: > 3 cm    Respiratory Devices and Support         Dental     Comment: Noted posts for implants on bottom gums    (+) missing, upper dentures and other      Cardiovascular   cardiovascular exam normal       Rhythm and rate: regular and normal     Pulmonary   pulmonary exam normal        breath sounds clear to auscultation           OUTSIDE LABS:  CBC:   Lab Results   Component Value Date    WBC 6.4 02/23/2022    WBC 5.6 09/07/2021    HGB 12.8 02/23/2022    HGB 13.8 09/07/2021    HCT 38.7 02/23/2022    HCT 42.3 09/07/2021     02/23/2022     09/07/2021     BMP:   Lab Results   Component Value Date     02/23/2022     09/07/2021    POTASSIUM 4.0 02/23/2022    POTASSIUM 5.2 (H) 09/07/2021    CHLORIDE 100 02/23/2022    CHLORIDE 101 09/07/2021    CO2 31 02/23/2022    CO2 29 09/07/2021    BUN 31 (H) 02/23/2022    BUN 27 (H) 09/07/2021    CR 1.22 (H) 02/23/2022    CR 1.53 (H) 09/07/2021    GLC 87 02/23/2022     09/07/2021     COAGS:   Lab Results   Component Value Date    PTT <20 (L) 07/28/2020    INR 0.97 07/28/2020     POC: No results found for: BGM, HCG, HCGS  HEPATIC:   Lab Results   Component Value Date    ALBUMIN 4.3 02/23/2022    PROTTOTAL 6.2 (L) 02/23/2022    ALT 11 02/23/2022    AST 14 02/23/2022    ALKPHOS 46 02/23/2022    BILITOTAL 0.8 02/23/2022     OTHER:   Lab Results   Component Value Date    LACT 1.3 07/28/2020    VARGHESE 10.6 (H) 02/23/2022    MAG 2.0 07/28/2020       Anesthesia Plan    ASA Status:  3   NPO Status:  NPO Appropriate    Anesthesia Type: MAC.              Consents    Anesthesia Plan(s) and associated risks, benefits, and realistic alternatives discussed. Questions answered and patient/representative(s) expressed understanding.    - Discussed:     - Discussed with:  Patient         Postoperative Care            Comments:                 David Kellerman, APRN CRNA

## 2022-03-09 NOTE — OP NOTE
[facility] Gynecology Operative Note    Pre-operative diagnosis: Postmenopausal bleeding   Post-operative diagnosis: Same  Endometrial polyp   Procedure: Hysteroscopy  Morcellation of polyp   Surgeon: Lennox Floyd MD   Assistant(s): None   Anesthesia: MAC (monitored anesthesia care)   Estimated blood loss: less than 50ml   Total IV fluids: (See anesthesia record)   Blood transfusion: No transfusion was given during surgery   Total urine output: (See anesthesia record)   Drains: None   Specimens: Endometrial polyp   Findings: Large endometrial polyp, otherwise atrophic appearing endometrium   Complications: None   Condition: Stable   Procedure details: After consent was obtained thepatient was taken to the OR suite and placed under Monitored Anesthesia.  She was prepped and draped sterile and positioned in dorsal lithotomy position in candy cane stirrups.  After timeout was peformed a speculum wasplaced in the vagina and the cervix was visualized.  0.25 % Marcaine was used to infiltrate the cervix anteriorly and a paracervical block was injected at 3 and 9 o'clock.  The cervix was grasped witha tenaculum and a hysteroscope introduced after sounding the uterus to 8 cm. The cavity was distended with saline.  A large endometrial polyp was located in the uterine fundus, and both tubal ostia were seen.  Sharp curettage was then performed with the myosure device  visually resecting the polyp in full. Bleeding was minimal at that point.  The tenaculum was released from the cervix.  Pathology was submitted as endometrial polyp. Fluid deficit was 200 ml. The speculum was removed and the patient awakened form anesthesia and taken to PACU in stable condition. EBL was minimal. No complications.    Lennox Floyd MD FACOG  12:49 PM 3/9/2022

## 2022-03-09 NOTE — ANESTHESIA CARE TRANSFER NOTE
Patient: Bernarda Ramírez    Procedure: Procedure(s):  HYSTEROSCOPY, DIAGNOSTIC DILATION AND CURETTAGE       Diagnosis: Postmenopausal bleeding [N95.0]  Diagnosis Additional Information: No value filed.    Anesthesia Type:   MAC     Note:    Oropharynx: oropharynx clear of all foreign objects  Level of Consciousness: drowsy and awake  Oxygen Supplementation: nasal cannula  Level of Supplemental Oxygen (L/min / FiO2): 2  Independent Airway: airway patency satisfactory and stable  Dentition: dentition unchanged  Vital Signs Stable: post-procedure vital signs reviewed and stable  Report to RN Given: handoff report given  Patient transferred to: Phase II    Handoff Report: Identifed the Patient, Identified the Reponsible Provider, Reviewed the pertinent medical history, Discussed the surgical course, Reviewed Intra-OP anesthesia mangement and issues during anesthesia, Set expectations for post-procedure period and Allowed opportunity for questions and acknowledgement of understanding      Vitals:  Vitals Value Taken Time   BP     Temp     Pulse     Resp     SpO2         Electronically Signed By: KHUSHBOO Mena CRNA  March 9, 2022  12:43 PM

## 2022-03-09 NOTE — DISCHARGE INSTRUCTIONS
[facility]  Adult Discharge Orders & Instructions    ________________________________________________________________          For 12 hours after surgery  1. Get plenty of rest.  A responsible adult must stay with you for at least 12 hours after you leave the hospital.   2. You may feel lightheaded.  IF so, sit for a few minutes before standing.  Have someone help you get up.   3. You may have a slight fever. Call the doctor if your fever is over 101 F (38.3 C) (taken under the tongue) or lasts longer than 24 hours.  4. You may have a dry mouth, a sore throat, muscle aches or trouble sleeping.  These should go away after 24 hours.  5. Do not make important or legal decisions.  6.   Do not drive or use heavy equipment.  If you have weakness or tingling, don't drive or use heavy equipment until this feeling goes away.    To contact a doctor, call   [phone]_______________________

## 2022-03-09 NOTE — ANESTHESIA POSTPROCEDURE EVALUATION
Patient: Bernarda Ramírez    Procedure: Procedure(s):  HYSTEROSCOPY, DIAGNOSTIC DILATION AND CURETTAGE       Anesthesia Type:  MAC    Note:  Disposition: Outpatient   Postop Pain Control:            Sign Out: Well controlled pain   PONV: No   Neuro/Psych:             Sign Out: Acceptable/Baseline neuro status   Airway/Respiratory:             Sign Out: Acceptable/Baseline resp. status   CV/Hemodynamics:             Sign Out: Acceptable CV status   Other NRE: NONE   DID A NON-ROUTINE EVENT OCCUR? No           Last vitals:  Vitals Value Taken Time   /75 03/09/22 1337   Temp 98.3  F (36.8  C) 03/09/22 1337   Pulse 70 03/09/22 1337   Resp 10 03/09/22 1337   SpO2 100 % 03/09/22 1338   Vitals shown include unvalidated device data.    Electronically Signed By: David Kellerman, APRN CRNA  March 9, 2022  2:04 PM

## 2022-03-09 NOTE — OR NURSING
Bernarda Ramírez has been discharged to home at 1350 via W/C to private car accompanied by deepika Garcia and RN    Written discharge instructions were provided to deepika Garcia and pt.      Patient and adult caring for them verbalize understanding of discharge instructions including no driving until tomorrow and no longer taking narcotic pain medications - no operating mechanical equipment and no making any important decisions.They understand reason for discharge, and necessary follow-up appointments.

## 2022-03-11 LAB
PATH REPORT.COMMENTS IMP SPEC: NORMAL
PATH REPORT.FINAL DX SPEC: NORMAL
PATH REPORT.RELEVANT HX SPEC: NORMAL
PHOTO IMAGE: NORMAL

## 2022-03-14 ENCOUNTER — TELEPHONE (OUTPATIENT)
Dept: FAMILY MEDICINE | Facility: OTHER | Age: 87
End: 2022-03-14
Payer: COMMERCIAL

## 2022-03-14 NOTE — TELEPHONE ENCOUNTER
Patients daughter was called, below message was given. No other concerns at this time.    Elise Vela MA on 3/14/2022 at 2:53 PM    ----- Message from Yany Cantu MD sent at 3/3/2022 12:53 PM CST -----  Please call - urinalysis looks normal today.   Yany Cantu MD

## 2022-03-17 ENCOUNTER — OFFICE VISIT (OUTPATIENT)
Dept: OBGYN | Facility: OTHER | Age: 87
End: 2022-03-17
Attending: OBSTETRICS & GYNECOLOGY
Payer: MEDICARE

## 2022-03-17 VITALS
WEIGHT: 143 LBS | HEART RATE: 71 BPM | BODY MASS INDEX: 27.93 KG/M2 | DIASTOLIC BLOOD PRESSURE: 72 MMHG | SYSTOLIC BLOOD PRESSURE: 122 MMHG

## 2022-03-17 DIAGNOSIS — Z98.890 POSTOPERATIVE STATE: Primary | ICD-10-CM

## 2022-03-17 PROCEDURE — 99024 POSTOP FOLLOW-UP VISIT: CPT | Performed by: OBSTETRICS & GYNECOLOGY

## 2022-03-17 PROCEDURE — G0463 HOSPITAL OUTPT CLINIC VISIT: HCPCS

## 2022-03-17 NOTE — NURSING NOTE
Chief Complaint   Patient presents with     Surgical Followup     d/c follow up and polyepectomy - HAS HAD  BLEEDING ON AND OFF, DARK RED BLEEDING THIS AM        Initial /72   Pulse 71   Wt 64.9 kg (143 lb)   LMP  (LMP Unknown)   BMI 27.93 kg/m   Estimated body mass index is 27.93 kg/m  as calculated from the following:    Height as of 3/9/22: 1.524 m (5').    Weight as of this encounter: 64.9 kg (143 lb).  Medication Reconciliation: complete    FOOD SECURITY SCREENING QUESTIONS  Hunger Vital Signs:  Within the past 12 months we worried whether our food would run out before we got money to buy more. Never  Within the past 12 months the food we bought just didn't last and we didn't have money to get more. Never  Alejandra Burton LPN 3/17/2022 10:13 AM           Alejandra Burton LPN

## 2022-03-17 NOTE — PROGRESS NOTES
Bernarda Ramírez presents todayfor a postop checkup at 2 week(s) after D&C for postmenopausal bleeding.    S: Bowels and bladder are functioning normally, no pain. Spotting lightly since surgery.    Current Outpatient Medications   Medication     acetaminophen (TYLENOL) 650 MG CR tablet     aspirin (ASA) 81 MG EC tablet     atorvastatin (LIPITOR) 20 MG tablet     estradiol (ESTRACE) 0.1 MG/GM vaginal cream     hydrochlorothiazide (HYDRODIURIL) 12.5 MG tablet     lisinopril (ZESTRIL) 20 MG tablet     Lutein 6 MG TABS     vitamin D3 (CHOLECALCIFEROL) 50 mcg (2000 units) tablet     No current facility-administered medications for this visit.     Allergies   Allergen Reactions     Sulfa Drugs Unknown     History reviewed. No pertinent past medical history.  Past Surgical History:   Procedure Laterality Date     APPENDECTOMY       AS ESOPHAGOSCOPY, DIAGNOSTIC  03/06/2000     bilateral cataract  2009     BREAST BIOPSY, CORE RT/LT  09/10/2003    [facility]     COLONOSCOPY  07/30/2003    normal     ECTOPIC PREGNANCY SURGERY       EGD with dilation  09/14/2005     ENDOSCOPIC RELEASE CARPAL TUNNEL Right 11/12/2021    Procedure: RELEASE, CARPAL TUNNEL, ENDOSCOPIC;  Surgeon: Bandar Stevenson MD;  Location:  OR     HYSTEROSCOPY DIAGNOSTIC N/A 3/9/2022    Procedure: HYSTEROSCOPY, DIAGNOSTIC DILATION AND CURETTAGE;  Surgeon: Lennox Floyd MD;  Location:  OR     LAPAROSCOPIC CHOLECYSTECTOMY  01/1994     Left foot spur surgery, Obra's deformity  06/19/2003    Dr. Weeks at [facility]     RELEASE TRIGGER FINGER      right thumb     TRANSPOSITION ULNAR NERVE (ELBOW) Right 11/12/2021    Procedure: TRANSPOSITION, NERVE, ULNAR;  Surgeon: Bandar Stevenson MD;  Location:  OR       COMPLETE REVIEW OF SYSTEMS: Neg except as above    O: /72   Pulse 71   Wt 64.9 kg (143 lb)   LMP  (LMP Unknown)   BMI 27.93 kg/m   Body mass index is 27.93 kg/m .    EXAM:  NAD    I/P:  Stable  Benign polyp on D&C  F/u prn    Lennox Floyd MD  FACOG  10:50 AM 3/17/2022

## 2022-04-15 ENCOUNTER — OFFICE VISIT (OUTPATIENT)
Dept: ORTHOPEDICS | Facility: OTHER | Age: 87
End: 2022-04-15
Attending: FAMILY MEDICINE
Payer: COMMERCIAL

## 2022-04-15 DIAGNOSIS — G56.03 BILATERAL CARPAL TUNNEL SYNDROME: Primary | ICD-10-CM

## 2022-04-15 DIAGNOSIS — R20.0 NUMBNESS AND TINGLING IN RIGHT HAND: ICD-10-CM

## 2022-04-15 DIAGNOSIS — R20.2 NUMBNESS AND TINGLING IN RIGHT HAND: ICD-10-CM

## 2022-04-15 PROCEDURE — 99214 OFFICE O/P EST MOD 30 MIN: CPT | Performed by: SPECIALIST

## 2022-04-15 PROCEDURE — G0463 HOSPITAL OUTPT CLINIC VISIT: HCPCS

## 2022-04-15 NOTE — PROGRESS NOTES
Patient is here for follow up on her right hand.  Lucille Cifuentes LPN .....................4/15/2022 2:34 PM

## 2022-04-16 NOTE — PROGRESS NOTES
Visit Date: 04/15/2022    Bernarda returns for followup.  She is status post right endoscopic carpal tunnel release with right subcutaneous ulnar nerve transposition.  She is about 5 months out from surgery.  The patient continues to note numbness in the right upper extremity, and she is a vague historian, but it is difficult to state whether or not she has noted significant improvement.  She does have an EMG that shows significant carpal tunnel on the other side.  We are here to discuss options.  The patient has difficulty with picking up objects, frequently drops things.    PHYSICAL EXAMINATION:  Today reveals an elderly female.  She is alert and answers questions appropriately.  Examination of both upper extremities shows a well-healed incision over the right elbow and wrist.  The left hand shows no evidence of obvious extensor or flexor tenosynovitis.  She does have some thenar atrophy.  The patient's digital range of motion is mildly reduced.  She has numbness prior to provocative maneuvers, which compressed the median nerve.    IMAGING:  EMG studies reviewed, performed by Dr. Nolan.  This is dated 09/16/2021.  It shows bilateral carpal tunnel syndrome.  This is moderate on the left.    IMPRESSION:  1.  Status post right endoscopic carpal tunnel release with subcutaneous ulnar nerve transposition with ongoing symptoms.  2.  Left carpal tunnel syndrome.  At this point, I explained to the patient that her symptoms may continue to improve.  Her degree of nerve compression showed severe carpal tunnel preoperatively, and this may take some time to improve, if it does so.  We discussed proceeding with left endoscopic carpal tunnel release.  Risks, complications, and benefits were reviewed.  She is interested in proceeding with this, and this can be scheduled at her convenience.    Bandar Stevenson MD        D: 04/15/2022   T: 04/15/2022   MT: spike    Name:     BERNARDA MALONE  MRN:      [id number]        Account:     [id number]   :      10/07/1934           Visit Date: 04/15/2022     Document: [id number]

## 2022-05-04 ENCOUNTER — OFFICE VISIT (OUTPATIENT)
Dept: FAMILY MEDICINE | Facility: OTHER | Age: 87
End: 2022-05-04
Attending: FAMILY MEDICINE
Payer: COMMERCIAL

## 2022-05-04 VITALS
HEART RATE: 76 BPM | BODY MASS INDEX: 28.07 KG/M2 | DIASTOLIC BLOOD PRESSURE: 74 MMHG | RESPIRATION RATE: 16 BRPM | TEMPERATURE: 97.4 F | OXYGEN SATURATION: 97 % | SYSTOLIC BLOOD PRESSURE: 134 MMHG | HEIGHT: 60 IN | WEIGHT: 143 LBS

## 2022-05-04 DIAGNOSIS — Z01.818 PREOP GENERAL PHYSICAL EXAM: Primary | ICD-10-CM

## 2022-05-04 DIAGNOSIS — I10 ESSENTIAL HYPERTENSION: ICD-10-CM

## 2022-05-04 DIAGNOSIS — I45.4 BUNDLE BRANCH BLOCK: ICD-10-CM

## 2022-05-04 DIAGNOSIS — G56.02 CARPAL TUNNEL SYNDROME OF LEFT WRIST: ICD-10-CM

## 2022-05-04 DIAGNOSIS — I65.02 STENOSIS OF LEFT VERTEBRAL ARTERY: ICD-10-CM

## 2022-05-04 LAB
ANION GAP SERPL CALCULATED.3IONS-SCNC: 7 MMOL/L (ref 3–14)
BASOPHILS # BLD AUTO: 0.1 10E3/UL (ref 0–0.2)
BASOPHILS NFR BLD AUTO: 1 %
BUN SERPL-MCNC: 26 MG/DL (ref 7–25)
CALCIUM SERPL-MCNC: 10.5 MG/DL (ref 8.6–10.3)
CHLORIDE BLD-SCNC: 101 MMOL/L (ref 98–107)
CO2 SERPL-SCNC: 32 MMOL/L (ref 21–31)
CREAT SERPL-MCNC: 1.16 MG/DL (ref 0.6–1.2)
EOSINOPHIL # BLD AUTO: 0.3 10E3/UL (ref 0–0.7)
EOSINOPHIL NFR BLD AUTO: 5 %
ERYTHROCYTE [DISTWIDTH] IN BLOOD BY AUTOMATED COUNT: 12.8 % (ref 10–15)
GFR SERPL CREATININE-BSD FRML MDRD: 45 ML/MIN/1.73M2
GLUCOSE BLD-MCNC: 89 MG/DL (ref 70–105)
HCT VFR BLD AUTO: 38.1 % (ref 35–47)
HGB BLD-MCNC: 12.2 G/DL (ref 11.7–15.7)
IMM GRANULOCYTES # BLD: 0 10E3/UL
IMM GRANULOCYTES NFR BLD: 1 %
LYMPHOCYTES # BLD AUTO: 1.2 10E3/UL (ref 0.8–5.3)
LYMPHOCYTES NFR BLD AUTO: 19 %
MCH RBC QN AUTO: 29.5 PG (ref 26.5–33)
MCHC RBC AUTO-ENTMCNC: 32 G/DL (ref 31.5–36.5)
MCV RBC AUTO: 92 FL (ref 78–100)
MONOCYTES # BLD AUTO: 0.6 10E3/UL (ref 0–1.3)
MONOCYTES NFR BLD AUTO: 10 %
NEUTROPHILS # BLD AUTO: 4.1 10E3/UL (ref 1.6–8.3)
NEUTROPHILS NFR BLD AUTO: 64 %
NRBC # BLD AUTO: 0 10E3/UL
NRBC BLD AUTO-RTO: 0 /100
PLATELET # BLD AUTO: 257 10E3/UL (ref 150–450)
POTASSIUM BLD-SCNC: 4.1 MMOL/L (ref 3.5–5.1)
RBC # BLD AUTO: 4.13 10E6/UL (ref 3.8–5.2)
SODIUM SERPL-SCNC: 140 MMOL/L (ref 134–144)
WBC # BLD AUTO: 6.2 10E3/UL (ref 4–11)

## 2022-05-04 PROCEDURE — G0463 HOSPITAL OUTPT CLINIC VISIT: HCPCS

## 2022-05-04 PROCEDURE — 36415 COLL VENOUS BLD VENIPUNCTURE: CPT | Mod: ZL | Performed by: FAMILY MEDICINE

## 2022-05-04 PROCEDURE — 80048 BASIC METABOLIC PNL TOTAL CA: CPT | Mod: ZL | Performed by: FAMILY MEDICINE

## 2022-05-04 PROCEDURE — 99213 OFFICE O/P EST LOW 20 MIN: CPT | Performed by: FAMILY MEDICINE

## 2022-05-04 PROCEDURE — 85025 COMPLETE CBC W/AUTO DIFF WBC: CPT | Mod: ZL | Performed by: FAMILY MEDICINE

## 2022-05-04 RX ORDER — ATORVASTATIN CALCIUM 20 MG/1
20 TABLET, FILM COATED ORAL DAILY
Qty: 90 TABLET | Refills: 3 | Status: SHIPPED | OUTPATIENT
Start: 2022-05-04

## 2022-05-04 RX ORDER — LISINOPRIL 20 MG/1
20 TABLET ORAL DAILY
Qty: 90 TABLET | Refills: 4 | Status: SHIPPED | OUTPATIENT
Start: 2022-05-04

## 2022-05-04 RX ORDER — HYDROCHLOROTHIAZIDE 12.5 MG/1
12.5 TABLET ORAL DAILY
Qty: 90 TABLET | Refills: 11 | Status: SHIPPED | OUTPATIENT
Start: 2022-05-04

## 2022-05-04 ASSESSMENT — PAIN SCALES - GENERAL: PAINLEVEL: NO PAIN (0)

## 2022-05-04 NOTE — H&P (VIEW-ONLY)
[facility]  [address]  [place] [address]  Phone: [phone]  Fax: [phone]  Primary Provider: Leida Brmabila  Pre-op Performing Provider: LEIDA BRAMBILA      PREOPERATIVE EVALUATION:  Today's date: 5/4/2022    Bernarda Ramírez is a 87 year old female who presents for a preoperative evaluation.    Surgical Information:  Surgery/Procedure: Left carpal tunnel release    Surgery Location: [facility]  Surgeon: Dr Stevenson  Surgery Date: 05/13/2022  Time of Surgery: TBD  Where patient plans to recover: At home with family  Fax number for surgical facility: Note does not need to be faxed, will be available electronically in Epic.    Type of Anesthesia Anticipated: to be determined  Bernarda was seen today for pre-op exam.    Diagnoses and all orders for this visit:    Preop general physical exam  -     CBC with Platelets & Differential; Future  -     Basic metabolic panel; Future  -     Basic metabolic panel  -     CBC with Platelets & Differential    Stenosis of left vertebral artery  -     atorvastatin (LIPITOR) 20 MG tablet; Take 1 tablet (20 mg) by mouth daily    Bundle branch block  -     atorvastatin (LIPITOR) 20 MG tablet; Take 1 tablet (20 mg) by mouth daily    Essential hypertension  -     hydrochlorothiazide (HYDRODIURIL) 12.5 MG tablet; Take 1 tablet (12.5 mg) by mouth daily  -     lisinopril (ZESTRIL) 20 MG tablet; Take 1 tablet (20 mg) by mouth daily  -     CBC with Platelets & Differential; Future  -     Basic metabolic panel; Future  -     Basic metabolic panel  -     CBC with Platelets & Differential    Carpal tunnel syndrome of left wrist       Okay to go ahead with planned procedure.  Hold aspirin starting tomorrow.  Okay to continue her other medications.    Leida Brambila MD     Subjective     HPI related to upcoming procedure: Left carpal tunnel release.  Previously had right carpal tunnel release and ulnar nerve transposition done and still has some  Results were discussed with patient and verbalized understanding. symptoms.  Left hand is now bothersome as well and confirmed on EMG to have carpal tunnel syndrome there.      Preop Questions 2/23/2022   1. Have you ever had a heart attack or stroke? No   2. Have you ever had surgery on your heart or blood vessels, such as a stent placement, a coronary artery bypass, or surgery on an artery in your head, neck, heart, or legs? No   3. Do you have chest pain with activity? No   4. Do you have a history of  heart failure? No   5. Do you currently have a cold, bronchitis or symptoms of other infection? No   6. Do you have a cough, shortness of breath, or wheezing? No   7. Do you or anyone in your family have previous history of blood clots? No   8. Do you or does anyone in your family have a serious bleeding problem such as prolonged bleeding following surgeries or cuts? No   9. Have you ever had problems with anemia or been told to take iron pills? UNKNOWN -    10. Have you had any abnormal blood loss such as black, tarry or bloody stools, or abnormal vaginal bleeding? No   11. Have you ever had a blood transfusion? No   12. Are you willing to have a blood transfusion if it is medically needed before, during, or after your surgery? Yes   13. Have you or any of your relatives ever had problems with anesthesia? No   14. Do you have sleep apnea, excessive snoring or daytime drowsiness? No   15. Do you have any artifical heart valves or other implanted medical devices like a pacemaker, defibrillator, or continuous glucose monitor? No   16. Do you have artificial joints? No   17. Are you allergic to latex? No     Health Care Directive:  Patient does not have a Health Care Directive or Living Will: Patient states has Advance Directive and will bring in a copy to clinic.    Preoperative Review of :   reviewed - controlled substances reflected in medication list.  PDMP Review       Value Time User    State PDMP site checked  Yes 5/4/2022 10:17 AM Abiel Brambila MD           Status  of Chronic Conditions:  HYPERLIPIDEMIA - Patient has a long history of significant Hyperlipidemia requiring medication for treatment with recent good control. Patient reports no problems or side effects with the medication.     HYPERTENSION - Patient has longstanding history of HTN , currently denies any symptoms referable to elevated blood pressure. Specifically denies chest pain, palpitations, dyspnea, orthopnea, PND or peripheral edema. Blood pressure readings have been in normal range. Current medication regimen is as listed below. Patient denies any side effects of medication.       Review of Systems  Constitutional, neuro, ENT, endocrine, pulmonary, cardiac, gastrointestinal, genitourinary, musculoskeletal, integument and psychiatric systems are negative, except as otherwise noted.    Patient Active Problem List    Diagnosis Date Noted     Chronic kidney disease, stage 3 10/12/2021     Priority: Medium     Syncope 07/28/2020     Priority: Medium     UTI (urinary tract infection) 07/28/2020     Priority: Medium     Stenosis of left vertebral artery 07/28/2020     Priority: Medium     Hypercalcemia 02/22/2019     Priority: Medium     Vitamin D deficiency 02/22/2010     Priority: Medium     Dyspepsia and disorder of function of stomach 11/10/2003     Priority: Medium     Acid peptic disease  IMO Update 10/11       Breast neoplasm 09/05/2003     Priority: Medium     Ill-defined nodular density in upper outer aspect of right breast on U/S and mammogram today; steriotactic BX scheduled in [place]  IMO Update 10/11       Convulsions (H) 11/18/2002     Priority: Medium     Seizure disorder, Petit small seizures, curently stable  IMO Update 10/11       Essential hypertension 11/18/2002     Priority: Medium     IMO Update       Bundle branch block 07/22/2002     Priority: Medium     Noted on EKG 06/17/03  IMO Update 10/11        History reviewed. No pertinent past medical history.  Past Surgical History:   Procedure  Laterality Date     APPENDECTOMY       AS ESOPHAGOSCOPY, DIAGNOSTIC  03/06/2000     bilateral cataract  2009     BREAST BIOPSY, CORE RT/LT  09/10/2003    [facility]     COLONOSCOPY  07/30/2003    normal     ECTOPIC PREGNANCY SURGERY       EGD with dilation  09/14/2005     ENDOSCOPIC RELEASE CARPAL TUNNEL Right 11/12/2021    Procedure: RELEASE, CARPAL TUNNEL, ENDOSCOPIC;  Surgeon: Bandar Stevenson MD;  Location:  OR     HYSTEROSCOPY DIAGNOSTIC N/A 3/9/2022    Procedure: HYSTEROSCOPY, DIAGNOSTIC DILATION AND CURETTAGE;  Surgeon: Lennox Floyd MD;  Location:  OR     LAPAROSCOPIC CHOLECYSTECTOMY  01/1994     Left foot spur surgery, 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 Weeks at [facility]     RELEASE TRIGGER FINGER      right thumb     TRANSPOSITION ULNAR NERVE (ELBOW) Right 11/12/2021    Procedure: TRANSPOSITION, NERVE, ULNAR;  Surgeon: Bandar Stevenson MD;  Location:  OR     Current Outpatient Medications   Medication Sig Dispense Refill     acetaminophen (TYLENOL) 650 MG CR tablet Take 1,300 mg by mouth nightly as needed        aspirin (ASA) 81 MG EC tablet Take 1 tablet (81 mg) by mouth daily 90 tablet 3     atorvastatin (LIPITOR) 20 MG tablet Take 1 tablet (20 mg) by mouth daily 90 tablet 3     estradiol (ESTRACE) 0.1 MG/GM vaginal cream Place 2 g vaginally twice a week 42.5 g 11     hydrochlorothiazide (HYDRODIURIL) 12.5 MG tablet Take 1 tablet (12.5 mg) by mouth daily 90 tablet 11     lisinopril (ZESTRIL) 20 MG tablet Take 1 tablet (20 mg) by mouth daily 90 tablet 4     Lutein 6 MG TABS Take 6 mg by mouth       vitamin D3 (CHOLECALCIFEROL) 50 mcg (2000 units) tablet Take 2,000 Units by mouth         Allergies   Allergen Reactions     Sulfa Drugs Unknown        Social History     Tobacco Use     Smoking status: Never Smoker     Smokeless tobacco: Never Used   Substance Use Topics     Alcohol use: Not Currently     History reviewed. No pertinent family history.  History   Drug Use Unknown         Objective  "    /74   Pulse 76   Temp 97.4  F (36.3  C) (Temporal)   Resp 16   Ht 1.518 m (4' 11.75\")   Wt 64.9 kg (143 lb)   LMP  (LMP Unknown)   SpO2 97%   Breastfeeding No   BMI 28.16 kg/m      Physical Exam  General Appearance: Pleasant, alert, appropriate appearance for age. No acute distress  Head Exam: Normal. Normocephalic, atraumatic.  Eye Exam: PERRLA, EOMI, conjunctivae, sclerae normal.  Ear Exam: Normal TM's bilaterally. Normal auditory canals and external ears. Non-tender.  Nose Exam: Normal external nose, mucus membranes, and septum.  OroPharynx Exam:  Dental hygiene adequate. Normal buccal mucosa. Normal pharynx.  Neck Exam:  Supple, no masses or nodes. No bruits  Thyroid Exam: No nodules or enlargement.  Chest/Respiratory Exam: Normal chest wall and respirations. Clear to auscultation.  Cardiovascular Exam: Regular rate and rhythm. S1, S2, no murmur, click, gallop, or rubs.  Gastrointestinal Exam: Soft, non-tender, no masses or organomegaly.  Lymphatic Exam: Non-palpable nodes in neck,clavicular, axillary, or inguinal regions.  Musculoskeletal Exam: Back is straight and non-tender, full ROM of upper and lower extremities.  Wrist exam per Dr. Stevenson  Foot Exam: Left and right foot: good pedal pulses  Skin: no rash or abnormalities  Neurologic Exam:  normal gross motor, tone coordination and no tremor.  Psychiatric Exam: Alert and oriented - appropriate affect.     Recent Labs   Lab Test 02/23/22  1055 09/07/21  1226 09/07/21  1225 07/29/20  0500 07/28/20  1050   HGB 12.8 13.8  --    < > 13.3    218  --    < > 176   INR  --   --   --   --  0.97     --  137   < > 138   POTASSIUM 4.0  --  5.2*   < > 4.3   CR 1.22*  --  1.53*   < > 0.95    < > = values in this interval not displayed.        Diagnostics:  Recent Results (from the past 24 hour(s))   Basic metabolic panel    Collection Time: 05/04/22  9:20 AM   Result Value Ref Range    Sodium 140 134 - 144 mmol/L    Potassium 4.1 3.5 - " 5.1 mmol/L    Chloride 101 98 - 107 mmol/L    Carbon Dioxide (CO2) 32 (H) 21 - 31 mmol/L    Anion Gap 7 3 - 14 mmol/L    Urea Nitrogen 26 (H) 7 - 25 mg/dL    Creatinine 1.16 0.60 - 1.20 mg/dL    Calcium 10.5 (H) 8.6 - 10.3 mg/dL    Glucose 89 70 - 105 mg/dL    GFR Estimate 45 (L) >60 mL/min/1.73m2   CBC with platelets and differential    Collection Time: 05/04/22  9:20 AM   Result Value Ref Range    WBC Count 6.2 4.0 - 11.0 10e3/uL    RBC Count 4.13 3.80 - 5.20 10e6/uL    Hemoglobin 12.2 11.7 - 15.7 g/dL    Hematocrit 38.1 35.0 - 47.0 %    MCV 92 78 - 100 fL    MCH 29.5 26.5 - 33.0 pg    MCHC 32.0 31.5 - 36.5 g/dL    RDW 12.8 10.0 - 15.0 %    Platelet Count 257 150 - 450 10e3/uL    % Neutrophils 64 %    % Lymphocytes 19 %    % Monocytes 10 %    % Eosinophils 5 %    % Basophils 1 %    % Immature Granulocytes 1 %    NRBCs per 100 WBC 0 <1 /100    Absolute Neutrophils 4.1 1.6 - 8.3 10e3/uL    Absolute Lymphocytes 1.2 0.8 - 5.3 10e3/uL    Absolute Monocytes 0.6 0.0 - 1.3 10e3/uL    Absolute Eosinophils 0.3 0.0 - 0.7 10e3/uL    Absolute Basophils 0.1 0.0 - 0.2 10e3/uL    Absolute Immature Granulocytes 0.0 <=0.4 10e3/uL    Absolute NRBCs 0.0 10e3/uL      No EKG required for low risk surgery (cataract, skin procedure, breast biopsy, etc).    Revised Cardiac Risk Index (RCRI):  The patient has the following serious cardiovascular risks for perioperative complications:   - No serious cardiac risks = 0 points     RCRI Interpretation: 0 points: Class I (very low risk - 0.4% complication rate)           Signed Electronically by: Abiel Brambila MD  Copy of this evaluation report is provided to requesting physician.

## 2022-05-04 NOTE — NURSING NOTE
"Chief Complaint   Patient presents with     Pre-Op Exam     Surgery date 05/13/2022       Initial /74   Pulse 76   Temp 97.4  F (36.3  C) (Temporal)   Resp 16   Ht 1.518 m (4' 11.75\")   Wt 64.9 kg (143 lb)   LMP  (LMP Unknown)   SpO2 97%   Breastfeeding No   BMI 28.16 kg/m   Estimated body mass index is 28.16 kg/m  as calculated from the following:    Height as of this encounter: 1.518 m (4' 11.75\").    Weight as of this encounter: 64.9 kg (143 lb).  Medication Reconciliation: complete    FOOD SECURITY SCREENING QUESTIONS  Hunger Vital Signs:  Within the past 12 months we worried whether our food would run out before we got money to buy more. Never  Within the past 12 months the food we bought just didn't last and we didn't have money to get more. Never        Advance care directive on file? no  Advance care directive provided to patient? Has one at home     Aracelis Fish LPN  "

## 2022-05-04 NOTE — PROGRESS NOTES
[facility]  [address]  [place] [address]  Phone: [phone]  Fax: [phone]  Primary Provider: Leida Brambila  Pre-op Performing Provider: LEIDA BRAMBILA      PREOPERATIVE EVALUATION:  Today's date: 5/4/2022    Bernarda Ramírez is a 87 year old female who presents for a preoperative evaluation.    Surgical Information:  Surgery/Procedure: Left carpal tunnel release    Surgery Location: [facility]  Surgeon: Dr Stevenson  Surgery Date: 05/13/2022  Time of Surgery: TBD  Where patient plans to recover: At home with family  Fax number for surgical facility: Note does not need to be faxed, will be available electronically in Epic.    Type of Anesthesia Anticipated: to be determined  Bernarda was seen today for pre-op exam.    Diagnoses and all orders for this visit:    Preop general physical exam  -     CBC with Platelets & Differential; Future  -     Basic metabolic panel; Future  -     Basic metabolic panel  -     CBC with Platelets & Differential    Stenosis of left vertebral artery  -     atorvastatin (LIPITOR) 20 MG tablet; Take 1 tablet (20 mg) by mouth daily    Bundle branch block  -     atorvastatin (LIPITOR) 20 MG tablet; Take 1 tablet (20 mg) by mouth daily    Essential hypertension  -     hydrochlorothiazide (HYDRODIURIL) 12.5 MG tablet; Take 1 tablet (12.5 mg) by mouth daily  -     lisinopril (ZESTRIL) 20 MG tablet; Take 1 tablet (20 mg) by mouth daily  -     CBC with Platelets & Differential; Future  -     Basic metabolic panel; Future  -     Basic metabolic panel  -     CBC with Platelets & Differential    Carpal tunnel syndrome of left wrist       Okay to go ahead with planned procedure.  Hold aspirin starting tomorrow.  Okay to continue her other medications.    Leida Brambila MD     Subjective     HPI related to upcoming procedure: Left carpal tunnel release.  Previously had right carpal tunnel release and ulnar nerve transposition done and still has some  symptoms.  Left hand is now bothersome as well and confirmed on EMG to have carpal tunnel syndrome there.      Preop Questions 2/23/2022   1. Have you ever had a heart attack or stroke? No   2. Have you ever had surgery on your heart or blood vessels, such as a stent placement, a coronary artery bypass, or surgery on an artery in your head, neck, heart, or legs? No   3. Do you have chest pain with activity? No   4. Do you have a history of  heart failure? No   5. Do you currently have a cold, bronchitis or symptoms of other infection? No   6. Do you have a cough, shortness of breath, or wheezing? No   7. Do you or anyone in your family have previous history of blood clots? No   8. Do you or does anyone in your family have a serious bleeding problem such as prolonged bleeding following surgeries or cuts? No   9. Have you ever had problems with anemia or been told to take iron pills? UNKNOWN -    10. Have you had any abnormal blood loss such as black, tarry or bloody stools, or abnormal vaginal bleeding? No   11. Have you ever had a blood transfusion? No   12. Are you willing to have a blood transfusion if it is medically needed before, during, or after your surgery? Yes   13. Have you or any of your relatives ever had problems with anesthesia? No   14. Do you have sleep apnea, excessive snoring or daytime drowsiness? No   15. Do you have any artifical heart valves or other implanted medical devices like a pacemaker, defibrillator, or continuous glucose monitor? No   16. Do you have artificial joints? No   17. Are you allergic to latex? No     Health Care Directive:  Patient does not have a Health Care Directive or Living Will: Patient states has Advance Directive and will bring in a copy to clinic.    Preoperative Review of :   reviewed - controlled substances reflected in medication list.  PDMP Review       Value Time User    State PDMP site checked  Yes 5/4/2022 10:17 AM Abiel Brambila MD           Status  of Chronic Conditions:  HYPERLIPIDEMIA - Patient has a long history of significant Hyperlipidemia requiring medication for treatment with recent good control. Patient reports no problems or side effects with the medication.     HYPERTENSION - Patient has longstanding history of HTN , currently denies any symptoms referable to elevated blood pressure. Specifically denies chest pain, palpitations, dyspnea, orthopnea, PND or peripheral edema. Blood pressure readings have been in normal range. Current medication regimen is as listed below. Patient denies any side effects of medication.       Review of Systems  Constitutional, neuro, ENT, endocrine, pulmonary, cardiac, gastrointestinal, genitourinary, musculoskeletal, integument and psychiatric systems are negative, except as otherwise noted.    Patient Active Problem List    Diagnosis Date Noted     Chronic kidney disease, stage 3 10/12/2021     Priority: Medium     Syncope 07/28/2020     Priority: Medium     UTI (urinary tract infection) 07/28/2020     Priority: Medium     Stenosis of left vertebral artery 07/28/2020     Priority: Medium     Hypercalcemia 02/22/2019     Priority: Medium     Vitamin D deficiency 02/22/2010     Priority: Medium     Dyspepsia and disorder of function of stomach 11/10/2003     Priority: Medium     Acid peptic disease  IMO Update 10/11       Breast neoplasm 09/05/2003     Priority: Medium     Ill-defined nodular density in upper outer aspect of right breast on U/S and mammogram today; steriotactic BX scheduled in [place]  IMO Update 10/11       Convulsions (H) 11/18/2002     Priority: Medium     Seizure disorder, Petit small seizures, curently stable  IMO Update 10/11       Essential hypertension 11/18/2002     Priority: Medium     IMO Update       Bundle branch block 07/22/2002     Priority: Medium     Noted on EKG 06/17/03  IMO Update 10/11        History reviewed. No pertinent past medical history.  Past Surgical History:   Procedure  Laterality Date     APPENDECTOMY       AS ESOPHAGOSCOPY, DIAGNOSTIC  03/06/2000     bilateral cataract  2009     BREAST BIOPSY, CORE RT/LT  09/10/2003    [facility]     COLONOSCOPY  07/30/2003    normal     ECTOPIC PREGNANCY SURGERY       EGD with dilation  09/14/2005     ENDOSCOPIC RELEASE CARPAL TUNNEL Right 11/12/2021    Procedure: RELEASE, CARPAL TUNNEL, ENDOSCOPIC;  Surgeon: Bandar Stevenson MD;  Location:  OR     HYSTEROSCOPY DIAGNOSTIC N/A 3/9/2022    Procedure: HYSTEROSCOPY, DIAGNOSTIC DILATION AND CURETTAGE;  Surgeon: Lennox Floyd MD;  Location:  OR     LAPAROSCOPIC CHOLECYSTECTOMY  01/1994     Left foot spur surgery, 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 Weeks at [facility]     RELEASE TRIGGER FINGER      right thumb     TRANSPOSITION ULNAR NERVE (ELBOW) Right 11/12/2021    Procedure: TRANSPOSITION, NERVE, ULNAR;  Surgeon: Bandar Stevenson MD;  Location:  OR     Current Outpatient Medications   Medication Sig Dispense Refill     acetaminophen (TYLENOL) 650 MG CR tablet Take 1,300 mg by mouth nightly as needed        aspirin (ASA) 81 MG EC tablet Take 1 tablet (81 mg) by mouth daily 90 tablet 3     atorvastatin (LIPITOR) 20 MG tablet Take 1 tablet (20 mg) by mouth daily 90 tablet 3     estradiol (ESTRACE) 0.1 MG/GM vaginal cream Place 2 g vaginally twice a week 42.5 g 11     hydrochlorothiazide (HYDRODIURIL) 12.5 MG tablet Take 1 tablet (12.5 mg) by mouth daily 90 tablet 11     lisinopril (ZESTRIL) 20 MG tablet Take 1 tablet (20 mg) by mouth daily 90 tablet 4     Lutein 6 MG TABS Take 6 mg by mouth       vitamin D3 (CHOLECALCIFEROL) 50 mcg (2000 units) tablet Take 2,000 Units by mouth         Allergies   Allergen Reactions     Sulfa Drugs Unknown        Social History     Tobacco Use     Smoking status: Never Smoker     Smokeless tobacco: Never Used   Substance Use Topics     Alcohol use: Not Currently     History reviewed. No pertinent family history.  History   Drug Use Unknown         Objective  "    /74   Pulse 76   Temp 97.4  F (36.3  C) (Temporal)   Resp 16   Ht 1.518 m (4' 11.75\")   Wt 64.9 kg (143 lb)   LMP  (LMP Unknown)   SpO2 97%   Breastfeeding No   BMI 28.16 kg/m      Physical Exam  General Appearance: Pleasant, alert, appropriate appearance for age. No acute distress  Head Exam: Normal. Normocephalic, atraumatic.  Eye Exam: PERRLA, EOMI, conjunctivae, sclerae normal.  Ear Exam: Normal TM's bilaterally. Normal auditory canals and external ears. Non-tender.  Nose Exam: Normal external nose, mucus membranes, and septum.  OroPharynx Exam:  Dental hygiene adequate. Normal buccal mucosa. Normal pharynx.  Neck Exam:  Supple, no masses or nodes. No bruits  Thyroid Exam: No nodules or enlargement.  Chest/Respiratory Exam: Normal chest wall and respirations. Clear to auscultation.  Cardiovascular Exam: Regular rate and rhythm. S1, S2, no murmur, click, gallop, or rubs.  Gastrointestinal Exam: Soft, non-tender, no masses or organomegaly.  Lymphatic Exam: Non-palpable nodes in neck,clavicular, axillary, or inguinal regions.  Musculoskeletal Exam: Back is straight and non-tender, full ROM of upper and lower extremities.  Wrist exam per Dr. Stevenson  Foot Exam: Left and right foot: good pedal pulses  Skin: no rash or abnormalities  Neurologic Exam:  normal gross motor, tone coordination and no tremor.  Psychiatric Exam: Alert and oriented - appropriate affect.     Recent Labs   Lab Test 02/23/22  1055 09/07/21  1226 09/07/21  1225 07/29/20  0500 07/28/20  1050   HGB 12.8 13.8  --    < > 13.3    218  --    < > 176   INR  --   --   --   --  0.97     --  137   < > 138   POTASSIUM 4.0  --  5.2*   < > 4.3   CR 1.22*  --  1.53*   < > 0.95    < > = values in this interval not displayed.        Diagnostics:  Recent Results (from the past 24 hour(s))   Basic metabolic panel    Collection Time: 05/04/22  9:20 AM   Result Value Ref [facility]    Sodium 140 134 - 144 mmol/L    Potassium 4.1 3.5 - " 5.1 mmol/L    Chloride 101 98 - 107 mmol/L    Carbon Dioxide (CO2) 32 (H) 21 - 31 mmol/L    Anion Gap 7 3 - 14 mmol/L    Urea Nitrogen 26 (H) 7 - 25 mg/dL    Creatinine 1.16 0.60 - 1.20 mg/dL    Calcium 10.5 (H) 8.6 - 10.3 mg/dL    Glucose 89 70 - 105 mg/dL    GFR Estimate 45 (L) >60 mL/min/1.73m2   CBC with platelets and differential    Collection Time: 05/04/22  9:20 AM   Result Value Ref [facility]    WBC Count 6.2 4.0 - 11.0 10e3/uL    RBC Count 4.13 3.80 - 5.20 10e6/uL    Hemoglobin 12.2 11.7 - 15.7 g/dL    Hematocrit 38.1 35.0 - 47.0 %    MCV 92 78 - 100 fL    MCH 29.5 26.5 - 33.0 pg    MCHC 32.0 31.5 - 36.5 g/dL    RDW 12.8 10.0 - 15.0 %    Platelet Count 257 150 - 450 10e3/uL    % Neutrophils 64 %    % Lymphocytes 19 %    % Monocytes 10 %    % Eosinophils 5 %    % Basophils 1 %    % Immature Granulocytes 1 %    NRBCs per 100 WBC 0 <1 /100    Absolute Neutrophils 4.1 1.6 - 8.3 10e3/uL    Absolute Lymphocytes 1.2 0.8 - 5.3 10e3/uL    Absolute Monocytes 0.6 0.0 - 1.3 10e3/uL    Absolute Eosinophils 0.3 0.0 - 0.7 10e3/uL    Absolute Basophils 0.1 0.0 - 0.2 10e3/uL    Absolute Immature Granulocytes 0.0 <=0.4 10e3/uL    Absolute NRBCs 0.0 10e3/uL      No EKG required for low risk surgery (cataract, skin procedure, breast biopsy, etc).    Revised Cardiac Risk Index (RCRI):  The patient has the following serious cardiovascular risks for perioperative complications:   - No serious cardiac risks = 0 points     RCRI Interpretation: 0 points: Class I (very low risk - 0.4% complication rate)           Signed Electronically by: Abiel Brambila MD  Copy of this evaluation report is provided to requesting physician.

## 2022-05-06 ENCOUNTER — ALLIED HEALTH/NURSE VISIT (OUTPATIENT)
Dept: FAMILY MEDICINE | Facility: OTHER | Age: 87
End: 2022-05-06
Attending: FAMILY MEDICINE
Payer: MEDICARE

## 2022-05-06 DIAGNOSIS — Z23 NEED FOR VACCINATION: Primary | ICD-10-CM

## 2022-05-06 PROCEDURE — 0054A COVID-19,PF,PFIZER (12+ YRS): CPT

## 2022-05-06 NOTE — PROGRESS NOTES
Immunization Documentation  Verified patient's first and last name, and . Stated reason for visit today is to receive COVID vaccine. Accompained to clinic visit with SELF. Denied any concerns with previous immunizations. Allergies reviewed. VIS handout(s) reviewed and given to take home. VACCINE prepared and administered per standing order. Administration documented in IMMUNIZATIONS (see flowsheet and order for further information).  Instructed to wait in lobby for 15 minutes post-injection and notify staff immediately of any reaction.     Diana Braun RN ....................  2022   9:57 AM

## 2022-05-09 ENCOUNTER — ALLIED HEALTH/NURSE VISIT (OUTPATIENT)
Dept: FAMILY MEDICINE | Facility: OTHER | Age: 87
End: 2022-05-09
Attending: SPECIALIST
Payer: MEDICARE

## 2022-05-09 DIAGNOSIS — Z20.822 COVID-19 RULED OUT: Primary | ICD-10-CM

## 2022-05-09 PROCEDURE — U0003 INFECTIOUS AGENT DETECTION BY NUCLEIC ACID (DNA OR RNA); SEVERE ACUTE RESPIRATORY SYNDROME CORONAVIRUS 2 (SARS-COV-2) (CORONAVIRUS DISEASE [COVID-19]), AMPLIFIED PROBE TECHNIQUE, MAKING USE OF HIGH THROUGHPUT TECHNOLOGIES AS DESCRIBED BY CMS-2020-01-R: HCPCS | Mod: ZL

## 2022-05-09 PROCEDURE — C9803 HOPD COVID-19 SPEC COLLECT: HCPCS

## 2022-05-10 LAB — SARS-COV-2 RNA RESP QL NAA+PROBE: NEGATIVE

## 2022-05-12 ENCOUNTER — ANESTHESIA EVENT (OUTPATIENT)
Dept: SURGERY | Facility: OTHER | Age: 87
End: 2022-05-12
Payer: MEDICARE

## 2022-05-13 ENCOUNTER — ANESTHESIA (OUTPATIENT)
Dept: SURGERY | Facility: OTHER | Age: 87
End: 2022-05-13
Payer: MEDICARE

## 2022-05-13 ENCOUNTER — HOSPITAL ENCOUNTER (OUTPATIENT)
Facility: OTHER | Age: 87
Discharge: HOME | End: 2022-05-13
Attending: SPECIALIST | Admitting: SPECIALIST
Payer: MEDICARE

## 2022-05-13 VITALS
BODY MASS INDEX: 28.07 KG/M2 | TEMPERATURE: 97.7 F | HEART RATE: 60 BPM | OXYGEN SATURATION: 99 % | DIASTOLIC BLOOD PRESSURE: 55 MMHG | HEIGHT: 60 IN | SYSTOLIC BLOOD PRESSURE: 128 MMHG | RESPIRATION RATE: 12 BRPM | WEIGHT: 143 LBS

## 2022-05-13 LAB — GLUCOSE BLDC GLUCOMTR-MCNC: 84 MG/DL (ref 70–99)

## 2022-05-13 PROCEDURE — 999N000141 HC STATISTIC PRE-PROCEDURE NURSING ASSESSMENT: Performed by: SPECIALIST

## 2022-05-13 PROCEDURE — 29848 WRIST ENDOSCOPY/SURGERY: CPT | Performed by: NURSE ANESTHETIST, CERTIFIED REGISTERED

## 2022-05-13 PROCEDURE — 250N000011 HC RX IP 250 OP 636: Performed by: SPECIALIST

## 2022-05-13 PROCEDURE — 360N000076 HC SURGERY LEVEL 3, PER MIN: Performed by: SPECIALIST

## 2022-05-13 PROCEDURE — 29848 WRIST ENDOSCOPY/SURGERY: CPT | Mod: LT | Performed by: SPECIALIST

## 2022-05-13 PROCEDURE — 250N000009 HC RX 250: Performed by: NURSE ANESTHETIST, CERTIFIED REGISTERED

## 2022-05-13 PROCEDURE — 370N000017 HC ANESTHESIA TECHNICAL FEE, PER MIN: Performed by: SPECIALIST

## 2022-05-13 PROCEDURE — 272N000001 HC OR GENERAL SUPPLY STERILE: Performed by: SPECIALIST

## 2022-05-13 PROCEDURE — 258N000003 HC RX IP 258 OP 636: Performed by: NURSE ANESTHETIST, CERTIFIED REGISTERED

## 2022-05-13 PROCEDURE — 82962 GLUCOSE BLOOD TEST: CPT

## 2022-05-13 PROCEDURE — 250N000011 HC RX IP 250 OP 636: Performed by: NURSE ANESTHETIST, CERTIFIED REGISTERED

## 2022-05-13 PROCEDURE — 250N000009 HC RX 250: Performed by: SPECIALIST

## 2022-05-13 PROCEDURE — 710N000012 HC RECOVERY PHASE 2, PER MINUTE: Performed by: SPECIALIST

## 2022-05-13 PROCEDURE — 99100 ANES PT EXTEME AGE<1 YR&>70: CPT | Performed by: NURSE ANESTHETIST, CERTIFIED REGISTERED

## 2022-05-13 RX ORDER — ONDANSETRON 2 MG/ML
4 INJECTION INTRAMUSCULAR; INTRAVENOUS EVERY 30 MIN PRN
Status: DISCONTINUED | OUTPATIENT
Start: 2022-05-13 | End: 2022-05-13 | Stop reason: HOSPADM

## 2022-05-13 RX ORDER — MAGNESIUM HYDROXIDE 1200 MG/15ML
LIQUID ORAL PRN
Status: DISCONTINUED | OUTPATIENT
Start: 2022-05-13 | End: 2022-05-13 | Stop reason: HOSPADM

## 2022-05-13 RX ORDER — PROPOFOL 10 MG/ML
INJECTION, EMULSION INTRAVENOUS CONTINUOUS PRN
Status: DISCONTINUED | OUTPATIENT
Start: 2022-05-13 | End: 2022-05-13

## 2022-05-13 RX ORDER — HYDROMORPHONE HYDROCHLORIDE 1 MG/ML
0.4 INJECTION, SOLUTION INTRAMUSCULAR; INTRAVENOUS; SUBCUTANEOUS EVERY 5 MIN PRN
Status: DISCONTINUED | OUTPATIENT
Start: 2022-05-13 | End: 2022-05-13 | Stop reason: HOSPADM

## 2022-05-13 RX ORDER — OXYCODONE HYDROCHLORIDE 5 MG/1
5 TABLET ORAL EVERY 4 HOURS PRN
Status: DISCONTINUED | OUTPATIENT
Start: 2022-05-13 | End: 2022-05-13 | Stop reason: HOSPADM

## 2022-05-13 RX ORDER — LIDOCAINE HYDROCHLORIDE 20 MG/ML
INJECTION, SOLUTION INFILTRATION; PERINEURAL PRN
Status: DISCONTINUED | OUTPATIENT
Start: 2022-05-13 | End: 2022-05-13

## 2022-05-13 RX ORDER — MEPERIDINE HYDROCHLORIDE 50 MG/ML
12.5 INJECTION INTRAMUSCULAR; INTRAVENOUS; SUBCUTANEOUS
Status: DISCONTINUED | OUTPATIENT
Start: 2022-05-13 | End: 2022-05-13 | Stop reason: HOSPADM

## 2022-05-13 RX ORDER — ONDANSETRON 4 MG/1
4 TABLET, ORALLY DISINTEGRATING ORAL EVERY 30 MIN PRN
Status: DISCONTINUED | OUTPATIENT
Start: 2022-05-13 | End: 2022-05-13 | Stop reason: HOSPADM

## 2022-05-13 RX ORDER — SODIUM CHLORIDE, SODIUM LACTATE, POTASSIUM CHLORIDE, CALCIUM CHLORIDE 600; 310; 30; 20 MG/100ML; MG/100ML; MG/100ML; MG/100ML
INJECTION, SOLUTION INTRAVENOUS CONTINUOUS
Status: DISCONTINUED | OUTPATIENT
Start: 2022-05-13 | End: 2022-05-13 | Stop reason: HOSPADM

## 2022-05-13 RX ORDER — LIDOCAINE 40 MG/G
CREAM TOPICAL
Status: DISCONTINUED | OUTPATIENT
Start: 2022-05-13 | End: 2022-05-13 | Stop reason: HOSPADM

## 2022-05-13 RX ORDER — FENTANYL CITRATE 50 UG/ML
50 INJECTION, SOLUTION INTRAMUSCULAR; INTRAVENOUS
Status: DISCONTINUED | OUTPATIENT
Start: 2022-05-13 | End: 2022-05-13 | Stop reason: HOSPADM

## 2022-05-13 RX ORDER — BUPIVACAINE HYDROCHLORIDE 2.5 MG/ML
INJECTION, SOLUTION EPIDURAL; INFILTRATION; INTRACAUDAL PRN
Status: DISCONTINUED | OUTPATIENT
Start: 2022-05-13 | End: 2022-05-13 | Stop reason: HOSPADM

## 2022-05-13 RX ORDER — PROPOFOL 10 MG/ML
INJECTION, EMULSION INTRAVENOUS PRN
Status: DISCONTINUED | OUTPATIENT
Start: 2022-05-13 | End: 2022-05-13

## 2022-05-13 RX ORDER — FENTANYL CITRATE 50 UG/ML
25 INJECTION, SOLUTION INTRAMUSCULAR; INTRAVENOUS EVERY 5 MIN PRN
Status: DISCONTINUED | OUTPATIENT
Start: 2022-05-13 | End: 2022-05-13 | Stop reason: HOSPADM

## 2022-05-13 RX ADMIN — PROPOFOL 50 MG: 10 INJECTION, EMULSION INTRAVENOUS at 09:50

## 2022-05-13 RX ADMIN — PROPOFOL 75 MCG/KG/MIN: 10 INJECTION, EMULSION INTRAVENOUS at 09:51

## 2022-05-13 RX ADMIN — SODIUM CHLORIDE, POTASSIUM CHLORIDE, SODIUM LACTATE AND CALCIUM CHLORIDE: 600; 310; 30; 20 INJECTION, SOLUTION INTRAVENOUS at 09:34

## 2022-05-13 RX ADMIN — LIDOCAINE HYDROCHLORIDE 40 MG: 20 INJECTION, SOLUTION INFILTRATION; PERINEURAL at 09:50

## 2022-05-13 NOTE — DISCHARGE INSTRUCTIONS
[facility]  Adult Discharge Orders & Instructions    ________________________________________________________________          For 12 hours after surgery  Get plenty of rest.  A responsible adult must stay with you for at least 12 hours after you leave the hospital.   You may feel lightheaded.  IF so, sit for a few minutes before standing.  Have someone help you get up.   You may have a slight fever. Call the doctor if your fever is over 101 F (38.3 C) (taken under the tongue) or lasts longer than 24 hours.  You may have a dry mouth, a sore throat, muscle aches or trouble sleeping.  These should go away after 24 hours.  Do not make important or legal decisions.  6.   Do not drive or use heavy equipment.  If you have weakness or tingling, don't drive or use heavy equipment until this feeling goes away.    To contact a doctor, call   [phone]_______________________     Surgeon Contact Information    If you have questions or concerns related to your procedure please contact your surgeon through [facility] at [phone].

## 2022-05-13 NOTE — ANESTHESIA PREPROCEDURE EVALUATION
Anesthesia Pre-Procedure Evaluation    Patient: Bernarda Raímrez   MRN: [id number] : 10/7/1934        Preoperative Diagnosis: Carpal tunnel syndrome [G56.00]    Procedure : Procedure(s):  RELEASE, CARPAL TUNNEL, left          No past medical history on file.   Past Surgical History:   Procedure Laterality Date     APPENDECTOMY       AS ESOPHAGOSCOPY, DIAGNOSTIC  2000     bilateral cataract  2009     BREAST BIOPSY, CORE RT/LT  09/10/2003    [facility]     COLONOSCOPY  2003    normal     ECTOPIC PREGNANCY SURGERY       EGD with dilation  2005     ENDOSCOPIC RELEASE CARPAL TUNNEL Right 2021    Procedure: RELEASE, CARPAL TUNNEL, ENDOSCOPIC;  Surgeon: Bandar Stevenson MD;  Location: [facility] OR     HYSTEROSCOPY DIAGNOSTIC N/A 3/9/2022    Procedure: HYSTEROSCOPY, DIAGNOSTIC DILATION AND CURETTAGE;  Surgeon: Lennox Floyd MD;  Location:  OR     LAPAROSCOPIC CHOLECYSTECTOMY  1994     Left foot spur surgery, Bora's deformity  2003    Dr. Weeks at [facility]     RELEASE TRIGGER FINGER      right thumb     TRANSPOSITION ULNAR NERVE (ELBOW) Right 2021    Procedure: TRANSPOSITION, NERVE, ULNAR;  Surgeon: Bandar Stevenson MD;  Location: [facility] OR      Allergies   Allergen Reactions     Sulfa Drugs Unknown      Social History     Tobacco Use     Smoking status: Never Smoker     Smokeless tobacco: Never Used   Substance Use Topics     Alcohol use: Not Currently      Wt Readings from Last 1 Encounters:   22 64.9 kg (143 lb)        Anesthesia Evaluation   Pt has had prior anesthetic.     No history of anesthetic complications       ROS/MED HX  ENT/Pulmonary:  - neg pulmonary ROS     Neurologic: Comment: Last seizure was in 4      Cardiovascular: Comment: murmur    (+) hypertension-----fainting (syncope).     METS/Exercise Tolerance: 3 - Able to walk 1-2 blocks without stopping    Hematologic:  - neg hematologic  ROS     Musculoskeletal:  - neg musculoskeletal ROS     GI/Hepatic:     (+)  GERD,     Renal/Genitourinary:     (+) renal disease,     Endo:  - neg endo ROS     Psychiatric/Substance Use:  - neg psychiatric ROS     Infectious Disease:  - neg infectious disease ROS     Malignancy:  - neg malignancy ROS     Other:  - neg other ROS          Physical Exam    Airway        Mallampati: II   TM distance: > 3 FB   Neck ROM: full   Mouth opening: > 3 cm    Respiratory Devices and Support         Dental       (+) upper dentures and partials      Cardiovascular   cardiovascular exam normal       Rhythm and rate: regular and normal     Pulmonary   pulmonary exam normal        breath sounds clear to auscultation           OUTSIDE LABS:  CBC:   Lab Results   Component Value Date    WBC 6.2 05/04/2022    WBC 6.4 02/23/2022    HGB 12.2 05/04/2022    HGB 12.8 02/23/2022    HCT 38.1 05/04/2022    HCT 38.7 02/23/2022     05/04/2022     02/23/2022     BMP:   Lab Results   Component Value Date     05/04/2022     02/23/2022    POTASSIUM 4.1 05/04/2022    POTASSIUM 4.0 02/23/2022    CHLORIDE 101 05/04/2022    CHLORIDE 100 02/23/2022    CO2 32 (H) 05/04/2022    CO2 31 02/23/2022    BUN 26 (H) 05/04/2022    BUN 31 (H) 02/23/2022    CR 1.16 05/04/2022    CR 1.22 (H) 02/23/2022    GLC 89 05/04/2022    GLC 87 02/23/2022     COAGS:   Lab Results   Component Value Date    PTT <20 (L) 07/28/2020    INR 0.97 07/28/2020     POC: No results found for: BGM, HCG, HCGS  HEPATIC:   Lab Results   Component Value Date    ALBUMIN 4.3 02/23/2022    PROTTOTAL 6.2 (L) 02/23/2022    ALT 11 02/23/2022    AST 14 02/23/2022    ALKPHOS 46 02/23/2022    BILITOTAL 0.8 02/23/2022     OTHER:   Lab Results   Component Value Date    LACT 1.3 07/28/2020    VARGHESE 10.5 (H) 05/04/2022    MAG 2.0 07/28/2020       Anesthesia Plan    ASA Status:  3   NPO Status:  NPO Appropriate    Anesthesia Type: MAC.     - Reason for MAC: straight local not clinically adequate   Induction: Intravenous.   Maintenance: Balanced.         Consents    Anesthesia Plan(s) and associated risks, benefits, and realistic alternatives discussed. Questions answered and patient/representative(s) expressed understanding.     - Discussed: Risks, Benefits and Alternatives for BOTH SEDATION and the PROCEDURE were discussed     - Discussed with:  Patient      - Extended Intubation/Ventilatory Support Discussed: No.      - Patient is DNR/DNI Status: No    Use of blood products discussed: No .     Postoperative Care    Pain management: IV analgesics, Multi-modal analgesia, Peripheral nerve block (Single Shot).   PONV prophylaxis: Ondansetron (or other 5HT-3)     Comments:    Other Comments:                 KHUSHBOO BESS CRNA

## 2022-05-13 NOTE — INTERVAL H&P NOTE
"The History and Physical has been reviewed, the patient has been examined and no changes have occurred in the patient's condition since the H & P was completed.         Clinical Conditions Present on Arrival:  Clinically Significant Risk Factors Present on Admission            # Hypercalcemia: Ca = N/A and/or iCa = N/A on admission, will monitor as appropriate       # Platelet Defect: home medication list includes an antiplatelet medication  # Overweight: Estimated body mass index is 28.16 kg/m  as calculated from the following:    Height as of 5/4/22: 1.518 m (4' 11.75\").    Weight as of 5/4/22: 64.9 kg (143 lb).       "

## 2022-05-13 NOTE — OR NURSING
patient has been discharged to home at 1130 via wheelchair accompanied by daughter    Written discharge instructions were provided to patient.  Prescriptions were none.      Patient and adult caring for them verbalize understanding of discharge instructions including no driving until tomorrow and no longer taking narcotic pain medications - no operating mechanical equipment and no making any important decisions.They understand reason for discharge, and necessary follow-up appointments.

## 2022-05-13 NOTE — ANESTHESIA POSTPROCEDURE EVALUATION
Patient: Bernarda Ramírez    Procedure: Procedure(s):  RELEASE, CARPAL TUNNEL, ENDOSCOPIC       Anesthesia Type:  MAC    Note:  Disposition: Outpatient   Postop Pain Control: Uneventful            Sign Out: Well controlled pain   PONV: No   Neuro/Psych: Uneventful            Sign Out: Acceptable/Baseline neuro status   Airway/Respiratory: Uneventful            Sign Out: Acceptable/Baseline resp. status   CV/Hemodynamics: Uneventful            Sign Out: Acceptable CV status; No obvious hypovolemia; No obvious fluid overload   Other NRE: NONE   DID A NON-ROUTINE EVENT OCCUR? No           Last vitals:  Vitals Value Taken Time   /60 05/13/22 1030   Temp     Pulse 63 05/13/22 1030   Resp 12 05/13/22 1017   SpO2 98 % 05/13/22 1033   Vitals shown include unvalidated device data.    Electronically Signed By: KHUSHBOO BESS CRNA  May 13, 2022  10:34 AM

## 2022-05-13 NOTE — BRIEF OP NOTE
[facility]    Brief Operative Note    Pre-operative diagnosis:   left Carpal tunnel syndrome [G56.00]  Post-operative diagnosis Same as pre-operative diagnosis    Procedure: Procedure(s):  RELEASE, CARPAL TUNNEL, ENDOSCOPIC left  Surgeon: Surgeon(s) and Role:     * Bandar Setvenson MD - Primary     * Lc Calix PA - Assisting  Anesthesia: Combined MAC with Local   Estimated Blood Loss: None    Drains: None  Specimens: * No specimens in log *  Findings:   None.  Complications: None.  Implants: * No implants in log *

## 2022-05-13 NOTE — ANESTHESIA CARE TRANSFER NOTE
Patient: Bernarda Ramírez    Procedure: Procedure(s):  RELEASE, CARPAL TUNNEL, ENDOSCOPIC       Diagnosis: Carpal tunnel syndrome [G56.00]  Diagnosis Additional Information: No value filed.    Anesthesia Type:   MAC     Note:    Oropharynx: oropharynx clear of all foreign objects and spontaneously breathing  Level of Consciousness: drowsy  Oxygen Supplementation: nasal cannula  Level of Supplemental Oxygen (L/min / FiO2): 2  Independent Airway: airway patency satisfactory and stable  Dentition: dentition unchanged  Vital Signs Stable: post-procedure vital signs reviewed and stable  Report to RN Given: handoff report given  Patient transferred to: Phase II    Handoff Report: Identifed the Patient, Identified the Reponsible Provider, Reviewed the pertinent medical history, Discussed the surgical course, Reviewed Intra-OP anesthesia mangement and issues during anesthesia, Set expectations for post-procedure period and Allowed opportunity for questions and acknowledgement of understanding      Vitals:  Vitals Value Taken Time   BP     Temp     Pulse     Resp     SpO2 81 % 05/13/22 1014   Vitals shown include unvalidated device data.    Electronically Signed By: KHUSHBOO LANGE CRNA  May 13, 2022  10:15 AM

## 2022-05-20 ENCOUNTER — OFFICE VISIT (OUTPATIENT)
Dept: ORTHOPEDICS | Facility: OTHER | Age: 87
End: 2022-05-20
Attending: SPECIALIST
Payer: MEDICARE

## 2022-05-20 DIAGNOSIS — G56.03 BILATERAL CARPAL TUNNEL SYNDROME: Primary | ICD-10-CM

## 2022-05-20 PROCEDURE — 99024 POSTOP FOLLOW-UP VISIT: CPT | Performed by: SPECIALIST

## 2022-05-20 NOTE — PROGRESS NOTES
Visit Date: 2022    HISTORY:  Bernarda returns for followup status post left endoscopic carpal tunnel release.  Date of the surgery was 2022.  She is 1 week out.  Her numbness is improved on the left side.    PHYSICAL EXAMINATION:  Examination today confirms her incision to be healing nicely.  She has full digital range of motion.  Intrinsic function is intact.    ASSESSMENT AND PLAN:  Status post left endoscopic carpal tunnel release 1 week out.  Sutures were removed.  Steri-Strips were applied.  We will advance her activities and see her back as symptoms warrant.    Bandar Stevenson MD        D: 2022   T: 2022   MT: GALLITO    Name:     BERNARDA MALONE  MRN:      [id number]        Account:    [id number]   :      10/07/1934           Visit Date: 2022     Document: [id number]

## 2022-05-20 NOTE — NURSING NOTE
"Chief Complaint   Patient presents with     RECHECK     Left Endoscopic Carpal Tunnel Release Recheck        Patient is here today for Left CTR Recheck.     Hilary Li LPN on 5/20/2022 at 10:13 AM       Initial LMP  (LMP Unknown)  Estimated body mass index is 28.16 kg/m  as calculated from the following:    Height as of 5/13/22: 1.518 m (4' 11.75\").    Weight as of 5/13/22: 64.9 kg (143 lb).  Medication Reconciliation: complete    Hilary Li LPN  "

## 2022-08-11 ENCOUNTER — TELEPHONE (OUTPATIENT)
Dept: FAMILY MEDICINE | Facility: OTHER | Age: 87
End: 2022-08-11

## 2022-08-11 NOTE — TELEPHONE ENCOUNTER
Please call the patient.  She needs a work in soon for medication refills, follow up hand, and foot issue  Big toe.    Joyce Gayle on 8/11/2022 at 3:59 PM

## 2022-08-16 NOTE — TELEPHONE ENCOUNTER
The patient was given an appointment on September 6 th with Abiel Brambila MD.  Aracelis Fish LPN..................8/16/2022   5:02 PM

## 2022-09-05 NOTE — PROGRESS NOTES
Nursing Notes:   Carol Feng LPN  9/6/2022  1:40 PM  Signed  Chief Complaint   Patient presents with     Pain     Bilat hands      Here today with granddaughter for complaints of bilat hand pain despite carpal tunnel surgery. Also complaints of left big toe pain.     Medication Reconciliation: complete    Carol Feng LPN               Subjective   Bernarda is a 87 year old accompanied by her Granddaughter, presenting for the following health issues:  Pain (Bilat hands )      History of Present Illness       Reason for visit:  Getting hands looked at    She eats 2-3 servings of fruits and vegetables daily.She consumes 1 sweetened beverage(s) daily.She exercises with enough effort to increase her heart rate 9 or less minutes per day.  She exercises with enough effort to increase her heart rate 3 or less days per week.   She is taking medications regularly.    Today's PHQ-9         PHQ-9 Total Score: 0    PHQ-9 Q9 Thoughts of better off dead/self-harm past 2 weeks :   Not at all    How difficult have these problems made it for you to do your work, take care of things at home, or get along with other people: Not difficult at all       Pain History:  When did you first notice your pain? - Acute Pain   Have you seen anyone else for your pain? Yes - Surgery   Where in your body do you have pain? Bilat hands         Review of Systems         Objective    /64 (BP Location: Right arm, Patient Position: Sitting, Cuff Size: Adult Regular)   Pulse 70   Temp 98.2  F (36.8  C) (Temporal)   Resp 16   Wt 65.7 kg (144 lb 12.8 oz)   LMP  (LMP Unknown)   SpO2 96%   Breastfeeding No   BMI 28.52 kg/m    Body mass index is 28.52 kg/m .  Physical Exam                       SUBJECTIVE:  Bernarda Ramírez  is a 87 year old female who comes in today because of follow-up of carpal tunnel. She had bilateral symptoms.  She had a right endoscopic carpal tunnel release with subcutaneous ulnar nerve transposition in November last  year.  She had left endoscopic carpal tunnel release on May 13.  She has had some ongoing symptoms.  She did not do occupational therapy after her surgery. She has burning in her hands and they feel numb. She is dropping things.     She has a problem with the great toenail on her left foot.  Its not painful but is thick.  She has to have someone cut her nails because she cannot do it    Past Medical, Family, and Social History reviewed and updated as noted below.   ROS is negative except as noted above       Allergies   Allergen Reactions     Sulfa Drugs Unknown   , No family history on file.,   Current Outpatient Medications   Medication     acetaminophen (TYLENOL) 650 MG CR tablet     aspirin (ASA) 81 MG EC tablet     atorvastatin (LIPITOR) 20 MG tablet     gabapentin (NEURONTIN) 100 MG capsule     hydrochlorothiazide (HYDRODIURIL) 12.5 MG tablet     lisinopril (ZESTRIL) 20 MG tablet     Lutein 6 MG TABS     vitamin D3 (CHOLECALCIFEROL) 50 mcg (2000 units) tablet     No current facility-administered medications for this visit.   , No past medical history on file.,   Patient Active Problem List    Diagnosis Date Noted     Chronic kidney disease, stage 3 10/12/2021     Priority: Medium     Syncope 07/28/2020     Priority: Medium     UTI (urinary tract infection) 07/28/2020     Priority: Medium     Stenosis of left vertebral artery 07/28/2020     Priority: Medium     Hypercalcemia 02/22/2019     Priority: Medium     Vitamin D deficiency 02/22/2010     Priority: Medium     Dyspepsia and disorder of function of stomach 11/10/2003     Priority: Medium     Acid peptic disease  IMO Update 10/11       Breast neoplasm 09/05/2003     Priority: Medium     Ill-defined nodular density in upper outer aspect of right breast on U/S and mammogram today; steriotactic BX scheduled in [place]  IMO Update 10/11       Convulsions (H) 11/18/2002     Priority: Medium     Seizure disorder, Petit small seizures, curently stable  IMO Update  10/11       Essential hypertension 11/18/2002     Priority: Medium     IMO Update       Bundle branch block 07/22/2002     Priority: Medium     Noted on EKG 06/17/03  IMO Update 10/11     ,   Past Surgical History:   Procedure Laterality Date     APPENDECTOMY       AS ESOPHAGOSCOPY, DIAGNOSTIC  03/06/2000     bilateral cataract  2009     BREAST BIOPSY, CORE RT/LT  09/10/2003    [place]     COLONOSCOPY  07/30/2003    normal     ECTOPIC PREGNANCY SURGERY       EGD with dilation  09/14/2005     ENDOSCOPIC RELEASE CARPAL TUNNEL Right 11/12/2021    Procedure: RELEASE, CARPAL TUNNEL, ENDOSCOPIC;  Surgeon: Bandar Stevenson MD;  Location: [facility] OR     ENDOSCOPIC RELEASE CARPAL TUNNEL Left 5/13/2022    Procedure: RELEASE, CARPAL TUNNEL, ENDOSCOPIC;  Surgeon: Bandar Stevenson MD;  Location:  OR     HYSTEROSCOPY DIAGNOSTIC N/A 3/9/2022    Procedure: HYSTEROSCOPY, DIAGNOSTIC DILATION AND CURETTAGE;  Surgeon: Lennox Floyd MD;  Location:  OR     LAPAROSCOPIC CHOLECYSTECTOMY  01/1994     Left foot spur surgery, 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 Weeks at [facility]     RELEASE TRIGGER FINGER      right thumb     TRANSPOSITION ULNAR NERVE (ELBOW) Right 11/12/2021    Procedure: TRANSPOSITION, NERVE, ULNAR;  Surgeon: Bandar Stevenson MD;  Location: [facility] OR    and   Social History     Tobacco Use     Smoking status: Never Smoker     Smokeless tobacco: Never Used   Substance Use Topics     Alcohol use: Not Currently     OBJECTIVE:  /64 (BP Location: Right arm, Patient Position: Sitting, Cuff Size: Adult Regular)   Pulse 70   Temp 98.2  F (36.8  C) (Temporal)   Resp 16   Wt 65.7 kg (144 lb 12.8 oz)   LMP  (LMP Unknown)   SpO2 96%   Breastfeeding No   BMI 28.52 kg/m     EXAM:  Alert cooperative, no distress.  Examination of her left great toe reveals a dystrophic thickened nail consistent with onychomycosis.  It looks like normal nail is growing in on the bottom.  Its not tender and is not ingrown.    Her incisions  are well-healed from her carpal tunnel bilaterally and her right ulnar nerve transposition.  She does have some numbness and tingling but normal strength.  ASSESSMENT/Plan :    Bernarda was seen today for pain.    Diagnoses and all orders for this visit:    Bilateral hand numbness  -     Orthopedic  Referral; Future  -     gabapentin (NEURONTIN) 100 MG capsule; Take 1 capsule (100 mg) by mouth 2 times daily  -     Occupational Therapy Referral; Future    History of bilateral carpal tunnel release  -     Orthopedic  Referral; Future  -     gabapentin (NEURONTIN) 100 MG capsule; Take 1 capsule (100 mg) by mouth 2 times daily  -     Occupational Therapy Referral; Future    Toenail deformity      Since her toenail is not bothering her, I do not think she needs to do anything differently.  We discussed referral to podiatry and she declines.    For her hands, we had a discussion with regard to the fact that maybe she waited longer than she should have for doing her carpal tunnel release and that she may have ongoing issues because of more permanent nerve damage.  Typically a year would need to pass to do what the baseline is but after 9 months on the right, it is unlikely that she will have much change.  She wonders about a cortisone shot and I certainly think that seeing Dr. Stevenson would be a sahu idea.  We will also refer her to occupational therapy to see if they might be able to help her along.  We will add some gabapentin 100 mg twice daily for a month.  If she tolerates it then would try and increase the dose to see if we can get to some efficacy.  Asked her to follow-up with me in a month and sooner if needed.    Abiel Brambila MD

## 2022-09-06 ENCOUNTER — OFFICE VISIT (OUTPATIENT)
Dept: FAMILY MEDICINE | Facility: OTHER | Age: 87
End: 2022-09-06
Attending: FAMILY MEDICINE
Payer: COMMERCIAL

## 2022-09-06 VITALS
DIASTOLIC BLOOD PRESSURE: 64 MMHG | RESPIRATION RATE: 16 BRPM | TEMPERATURE: 98.2 F | WEIGHT: 144.8 LBS | BODY MASS INDEX: 28.52 KG/M2 | HEART RATE: 70 BPM | OXYGEN SATURATION: 96 % | SYSTOLIC BLOOD PRESSURE: 122 MMHG

## 2022-09-06 DIAGNOSIS — L60.8 TOENAIL DEFORMITY: ICD-10-CM

## 2022-09-06 DIAGNOSIS — R20.0 BILATERAL HAND NUMBNESS: Primary | ICD-10-CM

## 2022-09-06 DIAGNOSIS — Z98.890 HISTORY OF BILATERAL CARPAL TUNNEL RELEASE: ICD-10-CM

## 2022-09-06 PROCEDURE — 99213 OFFICE O/P EST LOW 20 MIN: CPT | Performed by: FAMILY MEDICINE

## 2022-09-06 PROCEDURE — G0463 HOSPITAL OUTPT CLINIC VISIT: HCPCS

## 2022-09-06 RX ORDER — GABAPENTIN 100 MG/1
100 CAPSULE ORAL 2 TIMES DAILY
Qty: 60 CAPSULE | Refills: 11 | Status: SHIPPED | OUTPATIENT
Start: 2022-09-06 | End: 2022-10-17 | Stop reason: SINTOL

## 2022-09-06 ASSESSMENT — PATIENT HEALTH QUESTIONNAIRE - PHQ9
10. IF YOU CHECKED OFF ANY PROBLEMS, HOW DIFFICULT HAVE THESE PROBLEMS MADE IT FOR YOU TO DO YOUR WORK, TAKE CARE OF THINGS AT HOME, OR GET ALONG WITH OTHER PEOPLE: NOT DIFFICULT AT ALL
SUM OF ALL RESPONSES TO PHQ QUESTIONS 1-9: 0
SUM OF ALL RESPONSES TO PHQ QUESTIONS 1-9: 0

## 2022-09-06 ASSESSMENT — PAIN SCALES - GENERAL: PAINLEVEL: MODERATE PAIN (5)

## 2022-09-06 NOTE — PATIENT INSTRUCTIONS
They will call about your appointment with Cira Polanco.  They will call about your occupational therapy appointment.

## 2022-09-06 NOTE — NURSING NOTE
Chief Complaint   Patient presents with     Pain     Bilat hands      Here today with granddaughter for complaints of bilat hand pain despite carpal tunnel surgery. Also complaints of left big toe pain.     Medication Reconciliation: complete    Carol Feng LPN

## 2022-09-14 ENCOUNTER — HOSPITAL ENCOUNTER (OUTPATIENT)
Dept: OCCUPATIONAL THERAPY | Facility: OTHER | Age: 87
Setting detail: THERAPIES SERIES
Discharge: HOME | End: 2022-09-14
Attending: FAMILY MEDICINE
Payer: MEDICARE

## 2022-09-14 DIAGNOSIS — Z98.890 HISTORY OF BILATERAL CARPAL TUNNEL RELEASE: ICD-10-CM

## 2022-09-14 DIAGNOSIS — R20.0 BILATERAL HAND NUMBNESS: ICD-10-CM

## 2022-09-14 PROCEDURE — 97035 APP MDLTY 1+ULTRASOUND EA 15: CPT | Mod: GO,PO | Performed by: OCCUPATIONAL THERAPIST

## 2022-09-14 PROCEDURE — 97140 MANUAL THERAPY 1/> REGIONS: CPT | Mod: GO,PO | Performed by: OCCUPATIONAL THERAPIST

## 2022-09-14 PROCEDURE — 97165 OT EVAL LOW COMPLEX 30 MIN: CPT | Mod: GO,PO | Performed by: OCCUPATIONAL THERAPIST

## 2022-09-14 NOTE — PROGRESS NOTES
09/14/22 1000   Quick Adds   Quick Adds Certification   Therapy Certification   Certification date from 09/14/22   Certification date to 11/09/22   Certification I certify the need for these services furnished under this plan of treatment and while under my care.  (Physician co-signature of this document indicates review and certification of the therapy plan).   General Information/History   Start Of Care Date 09/14/22   Referring Physician Dr. Brambila   Orders Evaluate And Treat As Indicated   Orders Date 09/06/22   Medical Diagnosis R20.0 (ICD-10-CM) - Bilateral hand numbness  Z98.890 (ICD-10-CM) - History of bilateral carpal tunnel release   Additional Occupational Profile Info/Pertinent history of current problem Pt underwent Rt. arthoscopic CTR and ulnar nerve transposition on november 12 of 2022 and Left CTR on May 12 of 2022 due to severe nerve damage. She is now have significant nerve pain from the surgery and  mild pain at the Rt. medial elbow at [facility] surgical site. She reports the pain in her fingers is so severe she cries all joan time.   Past medical history reviewed   How/Where did it occur With repetition/overuse;From insidious onset   Surgical procedure bilaterla arthoscopic CTR and RT. ulnar nerve transposition   Chronicity Chronic   Hand Dominance Right   Affected side Bilateral   Functional limitations perform activities of daily living;perform required work activities;perform desired leisure / sports activities   Reported Symptoms Pain;Tingling;Numbness   Prior level of function Independent ADL;Independent IADL   Assistive devices walker and cane   Important Activities cooking, self cares, managing hearing aids   Level of functions comments Pt reports diffiulty with opening items, putting in hearing aides   Patient role/Employment history Retired   Patient/Family goals statement decrease pain,   General observations Pt is is weepy when talkign about her pain and the diffiulty the numbness in her  hands presents. She was seen managing her hearing aid using compensatory techniques. SHe reports dropping everything including her silverware.   Fall Risk Screen   Fall screen completed by OT   Have you fallen 2 or more times in the past year? No   Have you fallen and had an injury in the past year? No   Is patient a fall risk? Department fall risk interventions implemented   Pain   Pain Primary Pain Report   Primary Pain Report   Location fingers   Pain Quality Burning;Aching   Frequency Constant   Scale 6/10   Pain Is Worse In The A.m.   Pain Is Relieved By Nothing   Progression Since Onset Unchanged   Other Pain Report   Location Rt. medial elbow   Radiation Does Not Radiate   Pain Quality Aching   Frequency Intermittent   Scale 5/10   Pain Is Exacerbated By   (when placing elbow on table)   Pain Is Relieved By Rest   Progression Since Onset Unchanged   Edema   Edema Comments anticubital Rt:25cm    LT: 24 .5 cm   Scar/Wound   Scar/Wound Comments well healed   Tenderness   Overall - Right Rt medial elbow   Sensation Findings   Sensation   - Left Decreased Radial Nerve Distribution;Decreased Median Nerve Distribution  (2.83)   Sensation - Right Decreased Radial Nerve Distribution;Decreased Median Nerve Distribution  (3.61)   Strength    Avg - Left 18    Avg - Right 22   Lateral Pinch - Left 10   Lateral Pinch - Right 11   3 Point Pinch - Left 9   3 Point Pinch - Right 9   Education Assessment   Preferred Learning Style Listening;Demonstration;Pictures/video   Barriers to Learning Cognitive   Therapy Interventions   Planned Therapy Interventions Paraffin;Electrical Stimulation;Biofeedback;Scar Management;Edema Management;Desensitization;Strengthening;ROM;Coordination;Stretching;Manual Therapy;Home Program   Clinical Impression   Criteria for Skilled Therapeutic Interventions Met yes   OT Diagnosis decline in self cares and home mangement tasks due to pain   Influenced by the following impairments  Pain;Impaired sensation;Decreased strength   Assessment of Occupational Performance 1-3 Performance Deficits   Clinical Decision Making (Complexity) Low complexity   Therapy Frequency 1-2x week   Predicted Duration of Therapy Intervention (days/wks) 8 weeks   Risks and Benefits of Treatment have been explained. Yes   Patient, Family & other staff in agreement with plan of care Yes   Clinical Impression Comments Pt has bilateral hand weakness and decrease in sensation due to sever carpal tunnel. She is using adaptive strategies to manage self care ans daily activities. she woudl benefit from skilled OT for education, desensitization and strengthening   Hand Goal 1   Goal Identifier HEP   Goal Description Pt will have a HEP to decrease scar sensitivity and nerve pain to improve safety and independence with self cares   Target Date 09/21/22   Hand Goal 2   Goal Identifier self feeding   Goal Description PT will have a 50% decrease in dropping of silverware while eating using compensatory strategies   Target Date 10/12/22   Hand Goal 3   Goal Identifier pain   Goal Description Pt will be able to place her Rt elbow on the table with pain <3/10   Target Date 10/05/22   Total Evaluation Time   OT Eval, Low Complexity Minutes (56480) 23

## 2022-09-14 NOTE — PROGRESS NOTES
[facility]      OUTPATIENT OCCUPATIONAL THERAPY HAND EVALUATION  PLAN OF TREATMENT FOR OUTPATIENT REHABILITATION  (COMPLETE FOR INITIAL CLAIMS ONLY)  Patient's Last Name, First Name, M.I.  YOB: 1934  Bernarda Ramírez                        Provider s Name: [facility] Medical Record No.  [id number]     Onset Date:      Start of Care Date: 09/14/22   Type:     ___PT  _X_OT   ___SLP    Medical Diagnosis:    R20.0 (ICD-10-CM) - Bilateral hand numbness   Z98.890 (ICD-10-CM) - History of bilateral carpal tunnel release        Occupational Therapy Diagnosis:  decline in self cares and home mangement tasks due to pain    Visits from SOC: 1      _________________________________________________________________________________  Plan of Treatment/Functional Goals:  Planned Therapy Interventions:  Paraffin, Electrical Stimulation, Biofeedback, Scar Management, Edema Management, Desensitization, Strengthening, ROM, Coordination, Stretching, Manual Therapy, Home Program     Goals  1.  Goal Identifier: HEP       Goal Description: Pt will have a HEP to decrease scar sensitivity and nerve pain to improve safety and independence with self cares       Target Date: 09/21/22   2. Goal Identifier: self feeding       Goal Description: PT will have a 50% decrease in dropping of silverware while eating using compensatory strategies       Target Date: 10/12/22   3. Goal Identifier: pain       Goal Description: Pt will be able to place her Rt elbow on the table with pain <3/10       Target Date: 10/05/22                      Treatment Frequency: Therapy Frequency: 1-2x week  Predicated Duration of Therapy Intervention:  Predicted Duration of Therapy Intervention (days/wks): 8 weeks    CHIDI Michael CERTIFY THE NEED FOR THESE SERVICES FURNISHED UNDER        THIS PLAN OF  TREATMENT AND WHILE UNDER MY CARE     (Physician co-signature of this document indicates review and certification of the therapy plan).                Certification Period:  09/14/22 to 11/09/22            Referring Physician:  Dr. Brambila    Initial Assessment        See Epic Evaluation Start of Care Date: 09/14/22

## 2022-09-16 ENCOUNTER — PATIENT OUTREACH (OUTPATIENT)
Dept: CARE COORDINATION | Facility: CLINIC | Age: 87
End: 2022-09-16

## 2022-09-16 NOTE — PROGRESS NOTES
Clinic Care Coordination Contact    Follow Up Progress Note      Assessment: Patient has started with therapy for her hands. She has constant pain.  She is still managing to do the cleaning.      Intervention/Education provided during outreach: Encouraged to call if help is needed or they are struggling at home.      Plan:   Patient will call as needed.   Care Coordinator will resend letter with contact information as requested.  Mahi Minor RN on 9/16/2022 at 3:54 PM

## 2022-09-17 ENCOUNTER — HEALTH MAINTENANCE LETTER (OUTPATIENT)
Age: 87
End: 2022-09-17

## 2022-09-27 ENCOUNTER — HOSPITAL ENCOUNTER (OUTPATIENT)
Dept: OCCUPATIONAL THERAPY | Facility: OTHER | Age: 87
Setting detail: THERAPIES SERIES
Discharge: HOME | End: 2022-09-27
Attending: FAMILY MEDICINE
Payer: MEDICARE

## 2022-09-27 ENCOUNTER — TELEPHONE (OUTPATIENT)
Dept: FAMILY MEDICINE | Facility: OTHER | Age: 87
End: 2022-09-27

## 2022-09-27 PROCEDURE — 97140 MANUAL THERAPY 1/> REGIONS: CPT | Mod: GO,PO | Performed by: OCCUPATIONAL THERAPIST

## 2022-09-27 PROCEDURE — 97010 HOT OR COLD PACKS THERAPY: CPT | Mod: GO,PO | Performed by: OCCUPATIONAL THERAPIST

## 2022-09-27 PROCEDURE — 97110 THERAPEUTIC EXERCISES: CPT | Mod: GO,PO | Performed by: OCCUPATIONAL THERAPIST

## 2022-09-27 PROCEDURE — 97035 APP MDLTY 1+ULTRASOUND EA 15: CPT | Mod: GO,PO | Performed by: OCCUPATIONAL THERAPIST

## 2022-09-27 NOTE — TELEPHONE ENCOUNTER
Try the gabapentin just one at bedtime to see if she tolerates it better.  Abiel Brambila MD on 9/27/2022 at 4:18 PM
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
## 2022-09-27 NOTE — TELEPHONE ENCOUNTER
She stated she brought them to the place where you can get rid of your Rx's and dropped them in there so does not have anymore left. They made her so dizzy she stated she could not stand it.  Aracelis Fish LPN..................9/27/2022   4:32 PM

## 2022-09-27 NOTE — TELEPHONE ENCOUNTER
Patient states her new med made her dizzy, I scheduled her on 10/17, not sure if you want to see her sooner, if yes please let me know where to schedule.    Lyly Guerra on 9/27/2022 at 11:07 AM

## 2022-10-17 ENCOUNTER — OFFICE VISIT (OUTPATIENT)
Dept: FAMILY MEDICINE | Facility: OTHER | Age: 87
End: 2022-10-17
Attending: FAMILY MEDICINE
Payer: MEDICARE

## 2022-10-17 VITALS
SYSTOLIC BLOOD PRESSURE: 136 MMHG | OXYGEN SATURATION: 99 % | HEIGHT: 60 IN | HEART RATE: 75 BPM | RESPIRATION RATE: 18 BRPM | WEIGHT: 145 LBS | DIASTOLIC BLOOD PRESSURE: 80 MMHG | BODY MASS INDEX: 28.47 KG/M2 | TEMPERATURE: 97.5 F

## 2022-10-17 DIAGNOSIS — Z98.890 HISTORY OF BILATERAL CARPAL TUNNEL RELEASE: ICD-10-CM

## 2022-10-17 DIAGNOSIS — Z23 NEEDS FLU SHOT: ICD-10-CM

## 2022-10-17 DIAGNOSIS — E53.8 VITAMIN B12 DEFICIENCY (NON ANEMIC): ICD-10-CM

## 2022-10-17 DIAGNOSIS — R20.0 BILATERAL HAND NUMBNESS: Primary | ICD-10-CM

## 2022-10-17 DIAGNOSIS — N32.81 OVERACTIVE BLADDER: ICD-10-CM

## 2022-10-17 DIAGNOSIS — R35.0 URINARY FREQUENCY: ICD-10-CM

## 2022-10-17 DIAGNOSIS — E55.9 VITAMIN D DEFICIENCY: ICD-10-CM

## 2022-10-17 DIAGNOSIS — I10 ESSENTIAL HYPERTENSION: ICD-10-CM

## 2022-10-17 LAB
ALBUMIN UR-MCNC: NEGATIVE MG/DL
APPEARANCE UR: ABNORMAL
BACTERIA #/AREA URNS HPF: ABNORMAL /HPF
BILIRUB UR QL STRIP: NEGATIVE
COLOR UR AUTO: ABNORMAL
DEPRECATED CALCIDIOL+CALCIFEROL SERPL-MC: 39 UG/L (ref 30–100)
FOLATE SERPL-MCNC: 11.7 NG/ML
GLUCOSE UR STRIP-MCNC: NEGATIVE MG/DL
HGB UR QL STRIP: ABNORMAL
HOLD SPECIMEN: NORMAL
KETONES UR STRIP-MCNC: NEGATIVE MG/DL
LEUKOCYTE ESTERASE UR QL STRIP: ABNORMAL
MUCOUS THREADS #/AREA URNS LPF: PRESENT /LPF
NITRATE UR QL: POSITIVE
PH UR STRIP: 5.5 [PH] (ref 5–9)
RBC URINE: 5 /HPF
SP GR UR STRIP: 1.01 (ref 1–1.03)
SQUAMOUS EPITHELIAL: 1 /HPF
UROBILINOGEN UR STRIP-MCNC: NORMAL MG/DL
VIT B12 SERPL-MCNC: 449 PG/ML (ref 180–914)
WBC CLUMPS #/AREA URNS HPF: PRESENT /HPF
WBC URINE: 30 /HPF

## 2022-10-17 PROCEDURE — 81001 URINALYSIS AUTO W/SCOPE: CPT | Mod: ZL | Performed by: FAMILY MEDICINE

## 2022-10-17 PROCEDURE — 87086 URINE CULTURE/COLONY COUNT: CPT | Mod: ZL | Performed by: FAMILY MEDICINE

## 2022-10-17 PROCEDURE — 99213 OFFICE O/P EST LOW 20 MIN: CPT | Performed by: FAMILY MEDICINE

## 2022-10-17 PROCEDURE — G0463 HOSPITAL OUTPT CLINIC VISIT: HCPCS | Mod: 25

## 2022-10-17 PROCEDURE — 82607 VITAMIN B-12: CPT | Mod: ZL | Performed by: FAMILY MEDICINE

## 2022-10-17 PROCEDURE — G0008 ADMIN INFLUENZA VIRUS VAC: HCPCS

## 2022-10-17 PROCEDURE — G0463 HOSPITAL OUTPT CLINIC VISIT: HCPCS

## 2022-10-17 PROCEDURE — 36415 COLL VENOUS BLD VENIPUNCTURE: CPT | Mod: ZL | Performed by: FAMILY MEDICINE

## 2022-10-17 PROCEDURE — 82306 VITAMIN D 25 HYDROXY: CPT | Mod: ZL | Performed by: FAMILY MEDICINE

## 2022-10-17 PROCEDURE — 82746 ASSAY OF FOLIC ACID SERUM: CPT | Mod: ZL | Performed by: FAMILY MEDICINE

## 2022-10-17 RX ORDER — NORTRIPTYLINE HCL 10 MG
10 CAPSULE ORAL AT BEDTIME
Qty: 30 CAPSULE | Refills: 11 | Status: SHIPPED | OUTPATIENT
Start: 2022-10-17 | End: 2023-01-10

## 2022-10-17 ASSESSMENT — PAIN SCALES - GENERAL: PAINLEVEL: WORST PAIN (10)

## 2022-10-17 NOTE — PATIENT INSTRUCTIONS
Try nortriptyline at bedtime 10 mg. This may help the night time bathroom trips and also help the fingers.    See Dr. Stevenson as scheduled.    Try the wrist brace at bedtime.    We checked some labs to see if there is any other reason for the hands to be numb.    I sent a urine test and will check for infection. I sent a referral for the urologist for an opinion.

## 2022-10-17 NOTE — NURSING NOTE
"Chief Complaint   Patient presents with     Recheck Medication     Talk about side effects from Gabapentin and something else to try     She tried gabapentin for her hand pain and got very dizzy from it. She would like tot alk about something else for the pain.  Aracelis Fish LPN..................10/17/2022   10:10 AM    Initial /80   Pulse 75   Temp 97.5  F (36.4  C) (Temporal)   Resp 18   Ht 1.518 m (4' 11.75\")   Wt 65.8 kg (145 lb)   LMP  (LMP Unknown)   SpO2 99%   BMI 28.56 kg/m   Estimated body mass index is 28.56 kg/m  as calculated from the following:    Height as of this encounter: 1.518 m (4' 11.75\").    Weight as of this encounter: 65.8 kg (145 lb).  Medication Reconciliation: complete    FOOD SECURITY SCREENING QUESTIONS  Hunger Vital Signs:  Within the past 12 months we worried whether our food would run out before we got money to buy more. Never  Within the past 12 months the food we bought just didn't last and we didn't have money to get more. Never        Advance care directive on file? no  Advance care directive provided to patient? States she has one     Aracelis Fish LPN  "

## 2022-10-17 NOTE — LETTER
October 17, 2022      Bernarda Ramírez  [address] [place] [address]        Dear Bernarda,     Your labs look okay.  Your vitamin D and vitamin B12 levels were normal.  Your folate level was normal.  We checked all of these to see if that might be contributing to the numbness in your hands but I do not really think that it is.    I hope the new medication is helping with your symptoms.    It does not look like you have a bladder infection based on the initial test but we will know more when the culture comes back.  I am hopeful that Dr. Thibodeaux from urology will be able to help you with your voiding symptoms.    It was a pleasure seeing you the other day.  If you have any questions, please don't hesitate to call us.       Sincerely,        Abiel Brambila MD

## 2022-10-17 NOTE — PROGRESS NOTES
"Nursing Notes:   Aracelis Fish LPN  10/17/2022 10:15 AM  Signed  Chief Complaint   Patient presents with     Recheck Medication     Talk about side effects from Gabapentin and something else to try     She tried gabapentin for her hand pain and got very dizzy from it. She would like tot alk about something else for the pain.  Aracelis Fish LPN..................10/17/2022   10:10 AM    Initial /80   Pulse 75   Temp 97.5  F (36.4  C) (Temporal)   Resp 18   Ht 1.518 m (4' 11.75\")   Wt 65.8 kg (145 lb)   LMP  (LMP Unknown)   SpO2 99%   BMI 28.56 kg/m   Estimated body mass index is 28.56 kg/m  as calculated from the following:    Height as of this encounter: 1.518 m (4' 11.75\").    Weight as of this encounter: 65.8 kg (145 lb).  Medication Reconciliation: complete    FOOD SECURITY SCREENING QUESTIONS  Hunger Vital Signs:  Within the past 12 months we worried whether our food would run out before we got money to buy more. Never  Within the past 12 months the food we bought just didn't last and we didn't have money to get more. Never        Advance care directive on file? no  Advance care directive provided to patient? States she has one     Aracelis Fish LPN      SUBJECTIVE:  Bernarda Ramírez  is a 88 year old female who comes in for follow-up.  I last saw her about 6 weeks ago with the following history: \"comes in today because of follow-up of carpal tunnel. She had bilateral symptoms.  She had a right endoscopic carpal tunnel release with subcutaneous ulnar nerve transposition in November last year.  She had left endoscopic carpal tunnel release on May 13.  She has had some ongoing symptoms.  She did not do occupational therapy after her surgery. She has burning in her hands and they feel numb. She is dropping things.\"    The plan at that time was as follows: \"we had a discussion with regard to the fact that maybe she waited longer than she should have for doing her carpal tunnel release and that " "she may have ongoing issues because of more permanent nerve damage.  Typically a year would need to pass to do what the baseline is but after 9 months on the right, it is unlikely that she will have much change.  She wonders about a cortisone shot and I certainly think that seeing Dr. Stevenson would be a sahu idea.  We will also refer her to occupational therapy to see if they might be able to help her along.  We will add some gabapentin 100 mg twice daily for a month.  If she tolerates it then would try and increase the dose to see if we can get to some efficacy.  Asked her to follow-up with me in a month and sooner if needed.\"    She called on September 27 and said that the new pills (gabapentin) made her dizzy.  I suggested that she just tried 1 at bedtime to see if she tolerated better.  We started her only on 100 mg twice daily.  When she did not tolerate them, she brought him to the Rx disposal dropbox at the pharmacy so did not have any to try it.  She went to occupational therapy twice but then did not schedule anymore because she is convinced it is not going to help. She tried the home exercises and noted no change.    She is up every 2 hours to void. She goes every 2 hours during the day.  She has no dysuria.      She had her birthday and went out for breakfast and went to the [employer] and she won $700.     She is vaccinated and boosted for COVID-19.  She has not had her flu shot this year.  She has not had the new booster for COVID-19.  She is due for Boostrix in the pharmacy.    Past Medical, Family, and Social History reviewed and updated as noted below.   ROS is negative except as noted above       Allergies   Allergen Reactions     Sulfa Drugs Unknown   , History reviewed. No pertinent family history.,   Current Outpatient Medications   Medication     acetaminophen (TYLENOL) 650 MG CR tablet     aspirin (ASA) 81 MG EC tablet     atorvastatin (LIPITOR) 20 MG tablet     hydrochlorothiazide " (HYDRODIURIL) 12.5 MG tablet     lisinopril (ZESTRIL) 20 MG tablet     Lutein 6 MG TABS     nortriptyline (PAMELOR) 10 MG capsule     vitamin D3 (CHOLECALCIFEROL) 50 mcg (2000 units) tablet     No current facility-administered medications for this visit.   , History reviewed. No pertinent past medical history.,   Patient Active Problem List    Diagnosis Date Noted     Chronic kidney disease, stage 3 10/12/2021     Priority: Medium     Syncope 07/28/2020     Priority: Medium     UTI (urinary tract infection) 07/28/2020     Priority: Medium     Stenosis of left vertebral artery 07/28/2020     Priority: Medium     Hypercalcemia 02/22/2019     Priority: Medium     Vitamin D deficiency 02/22/2010     Priority: Medium     Dyspepsia and disorder of function of stomach 11/10/2003     Priority: Medium     Acid peptic disease  IMO Update 10/11       Breast neoplasm 09/05/2003     Priority: Medium     Ill-defined nodular density in upper outer aspect of right breast on U/S and mammogram today; steriotactic BX scheduled in [place]  IMO Update 10/11       Convulsions (H) 11/18/2002     Priority: Medium     Seizure disorder, Petit small seizures, curently stable  IMO Update 10/11       Essential hypertension 11/18/2002     Priority: Medium     IMO Update       Bundle branch block 07/22/2002     Priority: Medium     Noted on EKG 06/17/03  IMO Update 10/11     ,   Past Surgical History:   Procedure Laterality Date     APPENDECTOMY       AS ESOPHAGOSCOPY, DIAGNOSTIC  03/06/2000     bilateral cataract  2009     BREAST BIOPSY, CORE RT/LT  09/10/2003    Range     COLONOSCOPY  07/30/2003    normal     ECTOPIC PREGNANCY SURGERY       EGD with dilation  09/14/2005     ENDOSCOPIC RELEASE CARPAL TUNNEL Right 11/12/2021    Procedure: RELEASE, CARPAL TUNNEL, ENDOSCOPIC;  Surgeon: Bandar Stevenson MD;  Location: [facility] OR     ENDOSCOPIC RELEASE CARPAL TUNNEL Left 5/13/2022    Procedure: RELEASE, CARPAL TUNNEL, ENDOSCOPIC;  Surgeon: Elva  "MD Bandar;  Location:  OR     HYSTEROSCOPY DIAGNOSTIC N/A 3/9/2022    Procedure: HYSTEROSCOPY, DIAGNOSTIC DILATION AND CURETTAGE;  Surgeon: Lennox Floyd MD;  Location:  OR     LAPAROSCOPIC CHOLECYSTECTOMY  01/1994     Left foot spur surgery, 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 Weeks at [facility]     RELEASE TRIGGER FINGER      right thumb     TRANSPOSITION ULNAR NERVE (ELBOW) Right 11/12/2021    Procedure: TRANSPOSITION, NERVE, ULNAR;  Surgeon: Bandar Stevenson MD;  Location:  OR    and   Social History     Tobacco Use     Smoking status: Never     Smokeless tobacco: Never   Substance Use Topics     Alcohol use: Not Currently     OBJECTIVE:  /80   Pulse 75   Temp 97.5  F (36.4  C) (Temporal)   Resp 18   Ht 1.518 m (4' 11.75\")   Wt 65.8 kg (145 lb)   LMP  (LMP Unknown)   SpO2 99%   BMI 28.56 kg/m     EXAM:  Alert cooperative, no distress.  Affect is appropriate.  No change in the exam of her hands.    Results for orders placed or performed in visit on 10/17/22   Folic Acid     Status: Normal   Result Value Ref Range    Folic Acid 11.7 >=5.2 ng/mL   Vitamin B12     Status: Normal   Result Value Ref Range    Vitamin B12 449 180 - 914 pg/mL   Vitamin D Total     Status: Normal   Result Value Ref Range    Vitamin D, Total (25-Hydroxy) 39 30 - 100 ug/L   Extra Tube     Status: None    Narrative    The following orders were created for panel order Extra Tube.  Procedure                               Abnormality         Status                     ---------                               -----------         ------                     Extra Purple Top Tube[[id number]]                            Final result                 Please view results for these tests on the individual orders.   Extra Purple Top Tube     Status: None   Result Value Ref Range    Hold Specimen JIC    UA reflex to Microscopic and Culture     Status: Abnormal    Specimen: Urine, Midstream   Result Value Ref Range    Color Urine " Light Yellow Colorless, Straw, Light Yellow, Yellow    Appearance Urine Slightly Cloudy (A) Clear    Glucose Urine Negative Negative mg/dL    Bilirubin Urine Negative Negative    Ketones Urine Negative Negative mg/dL    Specific Gravity Urine 1.012 1.000 - 1.030    Blood Urine Trace (A) Negative    pH Urine 5.5 5.0 - 9.0    Protein Albumin Urine Negative Negative mg/dL    Urobilinogen Urine Normal Normal, 2.0 mg/dL    Nitrite Urine Positive (A) Negative    Leukocyte Esterase Urine Large (A) Negative    Bacteria Urine Moderate (A) None Seen /HPF    WBC Clumps Urine Present (A) None Seen /HPF    Mucus Urine Present (A) None Seen /LPF    RBC Urine 5 (H) <=2 /HPF    WBC Urine 30 (H) <=5 /HPF    Squamous Epithelials Urine 1 <=1 /HPF    Narrative    Urine Culture ordered based on laboratory criteria      ASSESSMENT/Plan :    Bernarda was seen today for recheck medication.    Diagnoses and all orders for this visit:    Bilateral hand numbness  -     Vitamin D Total; Future  -     Vitamin B12; Future  -     Folic Acid; Future  -     Folic Acid  -     Vitamin B12  -     Vitamin D Total  -     Adult Urology  Referral; Future  -     nortriptyline (PAMELOR) 10 MG capsule; Take 1 capsule (10 mg) by mouth At Bedtime    History of bilateral carpal tunnel release    Essential hypertension    Needs flu shot  -     FLU SHOT 65+ (FLUZONE HD)    Vitamin D deficiency  -     Vitamin D Total; Future  -     Vitamin D Total    Vitamin B12 deficiency (non anemic)  -     Vitamin B12; Future  -     Vitamin B12    Overactive bladder  -     nortriptyline (PAMELOR) 10 MG capsule; Take 1 capsule (10 mg) by mouth At Bedtime    Urinary frequency  -     UA reflex to Microscopic and Culture; Future  -     UA reflex to Microscopic and Culture  -     Urine Culture    Other orders  -     Extra Tube; Future  -     Extra Tube      Vitamin levels are all okay.  Does not appear to have significant pyuria and bacteriuria.    Trial of low-dose  nortriptyline to see if that would help with her nocturia and also with her tingling and numbness in her hands.  She will keep her appointment Dr. Stevenson.  She is also referred to Dr. Thibodeaux for evaluation and treatment.  She might be a candidate for Botox in the bladder.  She will keep in touch with her progress.    Abiel Brambila MD

## 2022-10-17 NOTE — PROGRESS NOTES
"      Subjective   Bernarda is a 88 year old, presenting for the following health issues:  Recheck Medication (Talk about side effects from Gabapentin and something else to try)      HPI           Review of Systems         Objective    /80   Pulse 75   Temp 97.5  F (36.4  C) (Temporal)   Resp 18   Ht 1.518 m (4' 11.75\")   Wt 65.8 kg (145 lb)   LMP  (LMP Unknown)   SpO2 99%   BMI 28.56 kg/m    Body mass index is 28.56 kg/m .  Physical Exam                       "

## 2022-10-19 DIAGNOSIS — N39.0 URINARY TRACT INFECTION WITHOUT HEMATURIA, SITE UNSPECIFIED: Primary | ICD-10-CM

## 2022-10-19 LAB — BACTERIA UR CULT: ABNORMAL

## 2022-10-19 RX ORDER — NITROFURANTOIN 25; 75 MG/1; MG/1
100 CAPSULE ORAL 2 TIMES DAILY
Qty: 10 CAPSULE | Refills: 0 | Status: SHIPPED | OUTPATIENT
Start: 2022-10-19 | End: 2022-10-24

## 2022-10-21 DIAGNOSIS — R31.29 MICROSCOPIC HEMATURIA: Primary | ICD-10-CM

## 2022-10-27 ENCOUNTER — PATIENT OUTREACH (OUTPATIENT)
Dept: CARE COORDINATION | Facility: CLINIC | Age: 87
End: 2022-10-27

## 2022-10-28 NOTE — PROGRESS NOTES
Clinic Care Coordination Contact  [facility]/Voicemail       Clinical Data: Care Coordinator Outreach  Outreach attempted x 1.  Left message on patient's roommate, [place] voicemail with call back information and requested return call.  Plan: Care Coordinator will wait for return call. She will call back if she could use assistance.   Mahi Minor RN on 10/28/2022 at 12:25 PM

## 2022-11-04 ENCOUNTER — OFFICE VISIT (OUTPATIENT)
Dept: ORTHOPEDICS | Facility: OTHER | Age: 87
End: 2022-11-04
Attending: FAMILY MEDICINE
Payer: COMMERCIAL

## 2022-11-04 DIAGNOSIS — R20.0 BILATERAL HAND NUMBNESS: ICD-10-CM

## 2022-11-04 DIAGNOSIS — Z98.890 HISTORY OF BILATERAL CARPAL TUNNEL RELEASE: ICD-10-CM

## 2022-11-04 PROCEDURE — 99214 OFFICE O/P EST MOD 30 MIN: CPT | Performed by: SPECIALIST

## 2022-11-04 PROCEDURE — G0463 HOSPITAL OUTPT CLINIC VISIT: HCPCS

## 2022-11-04 NOTE — PROGRESS NOTES
Patient is here for follow up on her hands.  Lucille Cifuentes LPN .....................11/4/2022 9:45 AM

## 2022-11-04 NOTE — PROGRESS NOTES
Visit Date: 2022    HISTORY OF PRESENT ILLNESS:  Bernarda Ramírez returns for followup.  She is now 88-years old.  She underwent left endoscopic carpal tunnel release as well as a right carpal tunnel release and ulnar nerve transposition.  She feels she is worse than preoperatively, and she is very frustrated with this.  She had a previous EMG that was performed by Dr. Hall on 2021.    PHYSICAL EXAMINATION:  Today shows all incisions to have healed nicely.  There is no evidence of any trophic skin change or adenopathy.  Her digital range of motion is well preserved.      DIAGNOSTIC DATA:  EMG study was reviewed performed by Dr. Hall dated 2021.  The EMG study shows bilateral carpal tunnel syndrome and cubital tunnel syndrome.  The carpal tunnel is very severe on the right and moderate on the left.  In addition to this, the cubital tunnel is also quite severe on the right.  On the left, this is mild.    IMPRESSION:  Status post bilateral endoscopic carpal tunnel release with subcutaneous ulnar nerve transposition on the right.  Based on her ongoing and persistent symptoms, we have recommended repeat EMG study.  This will be obtained, and I will contact the patient when the results become available.      NOTE TO DR. HALL:  Dear Dr. Hall, Bernarda Ramírez will be seeing you for EMG studies of both upper extremities.  You performed a previous EMG on her on 2021, which showed severe disease.  She underwent surgery but came back to the office complaining of actually worsening symptoms, so I am requesting repeat EMG studies to evaluate this further.    Thanks in advance for your thoughts regarding her care.    Bandar Stevenson MD        D: 2022   T: 2022   MT: [place]    Name:     BERNARDA RAMÍREZ  MRN:      [id number]        Account:    [id number]   :      10/07/1934           Visit Date: 2022     Document: [id number]

## 2022-11-28 ENCOUNTER — OFFICE VISIT (OUTPATIENT)
Dept: UROLOGY | Facility: OTHER | Age: 87
End: 2022-11-28
Attending: FAMILY MEDICINE
Payer: COMMERCIAL

## 2022-11-28 VITALS
RESPIRATION RATE: 16 BRPM | SYSTOLIC BLOOD PRESSURE: 118 MMHG | WEIGHT: 146.2 LBS | DIASTOLIC BLOOD PRESSURE: 60 MMHG | BODY MASS INDEX: 28.79 KG/M2 | OXYGEN SATURATION: 99 % | HEART RATE: 76 BPM

## 2022-11-28 DIAGNOSIS — R35.1 NOCTURIA: Primary | ICD-10-CM

## 2022-11-28 DIAGNOSIS — N32.81 OVERACTIVE BLADDER: ICD-10-CM

## 2022-11-28 PROCEDURE — G0463 HOSPITAL OUTPT CLINIC VISIT: HCPCS | Mod: 25

## 2022-11-28 PROCEDURE — 51798 US URINE CAPACITY MEASURE: CPT | Performed by: UROLOGY

## 2022-11-28 PROCEDURE — 99213 OFFICE O/P EST LOW 20 MIN: CPT | Performed by: UROLOGY

## 2022-11-28 PROCEDURE — G0463 HOSPITAL OUTPT CLINIC VISIT: HCPCS

## 2022-11-28 RX ORDER — OXYBUTYNIN CHLORIDE 5 MG/1
TABLET ORAL
Qty: 90 TABLET | Refills: 3 | Status: SHIPPED | OUTPATIENT
Start: 2022-11-28 | End: 2023-01-10

## 2022-11-28 ASSESSMENT — PAIN SCALES - GENERAL: PAINLEVEL: WORST PAIN (10)

## 2022-11-28 NOTE — PROGRESS NOTES
Type of Visit  EST    Chief Complaint  Urinary incontinence    HPI  Ms Ramírez is a 85 year old female who follows up with urinary incontinence.  Over 2 years ago the patient was seen for urinary retention.  She developed urinary retention as a complication from a UTI.  Following treatment of the UTI, catheter was removed and she was able to success void.  She has not previously had previous pelvic surgery.    Today she presents with a complaint of nocturi 4x per night on average (q2 hours).  She feels rested in the am.  She does take a diuretic each morning (hydrochlorothiazide).  Nocturia is her most bothersome symtpom.      Review of Systems  I personally reviewed the ROS with the patient.    Nursing Notes:   Smiley Lawton LPN  11/28/2022  1:33 PM  Addendum  Chief Complaint   Patient presents with     Follow Up     incontinence     Patient presents to the clinic today for a follow up for Incontinence    Review of Systems:    Weight loss:    No     Recent fever/chills:  No   Night sweats:   No  Current skin rash:  No   Recent hair loss:  No  Heat intolerance:  No   Cold intolerance:  No  Chest pain:   No   Palpitations:   No  Shortness of breath:  No   Wheezing:   No  Constipation:    No   Diarrhea:   No   Nausea:   No   Vomiting:   No   Kidney/side pain:  No   Back pain:   No  Frequent headaches:  No   Dizziness:     No  Leg swelling:   No   Calf pain:    No    Post-Void Residual  A post-void residual was measured by ultrasonic bladder scanner.  97 mL  Smiley Lawton LPN  11/28/2022 1:30 PM    Medication Reconciliation: completed   Smiley Lawton LPN  11/28/2022 1:17 PM       Physical Exam  Vitals:    11/28/22 1327   BP: 118/60   BP Location: Right arm   Patient Position: Sitting   Cuff Size: Adult Regular   Pulse: 76   Resp: 16   SpO2: 99%   Weight: 66.3 kg (146 lb 3.2 oz)     Constitutional: No acute distress.  Alert and cooperative   Pulmonary/Chest: Respirations are even and non-labored bilaterally, no audible  wheezing  Abdominal: Soft. No distension, tenderness, masses or guarding.   Extremities: FRANCO x 4, Warm. No clubbing.  No cyanosis.    Skin: Pink, warm and dry.  No visible rashes noted.  Back:  No left CVA tenderness.  No right CVA tenderness.  Genitourinary: Nonpalpable bladder    Labs  UA requested however patient is unable to void    Post-Void Residual  A post-void residual was measured by ultrasonic bladder scanner.  97 mL today    Assessment & Plan  Ms. Ramírez is a 85 year old female who follows up with high bother nocturia.    The patient was informed of the most common side effects with oral anticholinergic medication such as dry mouth, dry eyes, confusion and constipation.    Plan  Start oxybutynin IR 5mg PO qHS  Follow up in 1 months with PVR

## 2022-11-28 NOTE — NURSING NOTE
Chief Complaint   Patient presents with     Follow Up     incontinence     Patient presents to the clinic today for a follow up for Incontinence    Review of Systems:    Weight loss:    No     Recent fever/chills:  No   Night sweats:   No  Current skin rash:  No   Recent hair loss:  No  Heat intolerance:  No   Cold intolerance:  No  Chest pain:   No   Palpitations:   No  Shortness of breath:  No   Wheezing:   No  Constipation:    No   Diarrhea:   No   Nausea:   No   Vomiting:   No   Kidney/side pain:  No   Back pain:   No  Frequent headaches:  No   Dizziness:     No  Leg swelling:   No   Calf pain:    No    Post-Void Residual  A post-void residual was measured by ultrasonic bladder scanner.  97 mL  Smiley Lawton LPN  11/28/2022 1:30 PM    Medication Reconciliation: completed   Smiley Lawton LPN  11/28/2022 1:17 PM

## 2022-12-23 ENCOUNTER — PATIENT OUTREACH (OUTPATIENT)
Dept: CARE COORDINATION | Facility: CLINIC | Age: 87
End: 2022-12-23

## 2022-12-23 NOTE — PROGRESS NOTES
Clinic Care Coordination Contact    Follow Up Progress Note      Assessment: Patient struggles daily with severe burning in her wrists.  Has hard time holding her silverware.  Maris at [facility] visited recently. Patient is considering moving into [facility] as she has family there. Not sure if SO, [place], wants to go. She would like to meet at next appointment.     Care Gaps:    Health Maintenance Due   Topic Date Due     MICROALBUMIN  Never done     ZOSTER IMMUNIZATION (1 of 2) Never done     Pneumococcal Vaccine: 65+ Years (2 - PCV) 06/17/2004     DTAP/TDAP/TD IMMUNIZATION (2 - Td or Tdap) 08/27/2017     COVID-19 Vaccine (5 - Booster for Pfizer series) 07/01/2022     MEDICARE ANNUAL WELLNESS VISIT  08/19/2022     LIPID  08/19/2022     Intervention/Education provided during outreach: Pain management discussed       Plan:   Care Coordinator will follow up when in clinic on 1-10-23 with PCP.  Mahi Minor RN on 12/23/2022 at 4:46 PM

## 2023-01-10 ENCOUNTER — OFFICE VISIT (OUTPATIENT)
Dept: FAMILY MEDICINE | Facility: OTHER | Age: 88
End: 2023-01-10
Attending: FAMILY MEDICINE
Payer: COMMERCIAL

## 2023-01-10 ENCOUNTER — PATIENT OUTREACH (OUTPATIENT)
Dept: CARE COORDINATION | Facility: CLINIC | Age: 88
End: 2023-01-10

## 2023-01-10 VITALS
HEART RATE: 96 BPM | SYSTOLIC BLOOD PRESSURE: 138 MMHG | DIASTOLIC BLOOD PRESSURE: 74 MMHG | RESPIRATION RATE: 18 BRPM | BODY MASS INDEX: 28.87 KG/M2 | TEMPERATURE: 98.4 F | WEIGHT: 146.6 LBS | OXYGEN SATURATION: 98 %

## 2023-01-10 DIAGNOSIS — R20.0 BILATERAL HAND NUMBNESS: ICD-10-CM

## 2023-01-10 DIAGNOSIS — N95.2 ATROPHIC VAGINITIS: ICD-10-CM

## 2023-01-10 DIAGNOSIS — N30.00 ACUTE CYSTITIS WITHOUT HEMATURIA: ICD-10-CM

## 2023-01-10 DIAGNOSIS — Z23 HIGH PRIORITY FOR 2019-NCOV VACCINE: ICD-10-CM

## 2023-01-10 DIAGNOSIS — R30.0 DYSURIA: Primary | ICD-10-CM

## 2023-01-10 LAB
ALBUMIN UR-MCNC: 20 MG/DL
APPEARANCE UR: ABNORMAL
BACTERIA #/AREA URNS HPF: ABNORMAL /HPF
BILIRUB UR QL STRIP: NEGATIVE
COLOR UR AUTO: YELLOW
GLUCOSE UR STRIP-MCNC: NEGATIVE MG/DL
HGB UR QL STRIP: ABNORMAL
HYALINE CASTS: 1 /LPF
KETONES UR STRIP-MCNC: NEGATIVE MG/DL
LEUKOCYTE ESTERASE UR QL STRIP: ABNORMAL
NITRATE UR QL: POSITIVE
PH UR STRIP: 6 [PH] (ref 5–9)
RBC URINE: 24 /HPF
RENAL TUB EPI: 1 /HPF
SP GR UR STRIP: 1.01 (ref 1–1.03)
SQUAMOUS EPITHELIAL: 5 /HPF
TRANSITIONAL EPI: 1 /HPF
UROBILINOGEN UR STRIP-MCNC: NORMAL MG/DL
WBC CLUMPS #/AREA URNS HPF: PRESENT /HPF
WBC URINE: 146 /HPF

## 2023-01-10 PROCEDURE — G0463 HOSPITAL OUTPT CLINIC VISIT: HCPCS | Mod: 25

## 2023-01-10 PROCEDURE — 81001 URINALYSIS AUTO W/SCOPE: CPT | Mod: ZL | Performed by: FAMILY MEDICINE

## 2023-01-10 PROCEDURE — 91312 COVID-19 VACCINE BIVALENT BOOSTER 12+ (PFIZER): CPT

## 2023-01-10 PROCEDURE — 99214 OFFICE O/P EST MOD 30 MIN: CPT | Performed by: FAMILY MEDICINE

## 2023-01-10 PROCEDURE — 87186 SC STD MICRODIL/AGAR DIL: CPT | Mod: ZL | Performed by: FAMILY MEDICINE

## 2023-01-10 RX ORDER — ESTRADIOL 0.1 MG/G
2 CREAM VAGINAL
Qty: 42.5 G | Refills: 11 | Status: SHIPPED | OUTPATIENT
Start: 2023-01-12

## 2023-01-10 RX ORDER — NITROFURANTOIN 25; 75 MG/1; MG/1
100 CAPSULE ORAL 2 TIMES DAILY
Qty: 14 CAPSULE | Refills: 0 | Status: SHIPPED | OUTPATIENT
Start: 2023-01-10 | End: 2023-02-13

## 2023-01-10 ASSESSMENT — PAIN SCALES - GENERAL: PAINLEVEL: WORST PAIN (10)

## 2023-01-10 ASSESSMENT — PATIENT HEALTH QUESTIONNAIRE - PHQ9
SUM OF ALL RESPONSES TO PHQ QUESTIONS 1-9: 4
10. IF YOU CHECKED OFF ANY PROBLEMS, HOW DIFFICULT HAVE THESE PROBLEMS MADE IT FOR YOU TO DO YOUR WORK, TAKE CARE OF THINGS AT HOME, OR GET ALONG WITH OTHER PEOPLE: NOT DIFFICULT AT ALL
SUM OF ALL RESPONSES TO PHQ QUESTIONS 1-9: 4

## 2023-01-10 NOTE — PROGRESS NOTES
Bernarda was seen today for recheck and imm/inj.    Diagnoses and all orders for this visit:    Dysuria  -     UA reflex to Microscopic and Culture; Future  -     UA reflex to Microscopic and Culture  -     Urine Culture    Bilateral hand numbness  -     EMG; Future    Atrophic vaginitis  -     estradiol (ESTRACE) 0.1 MG/GM vaginal cream; Place 2 g vaginally twice a week    Acute cystitis without hematuria  -     nitroFURantoin macrocrystal-monohydrate (MACROBID) 100 MG capsule; Take 1 capsule (100 mg) by mouth 2 times daily    High priority for 2019-nCoV vaccine  -     COVID-19,PF,PFIZER BOOSTER BIVALENT 12+Yrs       It looks as if she might have a UTI.  She is having symptoms, so we will treat empirically with Macrobid pending culture.  She appears to have adequate renal function for this to be effective.  Modify based on culture and discussed this.    I think she also has an atrophic vaginitis as a cause of her vaginal symptoms.  We will trial Estrace vaginal cream twice weekly.  Discussed that she might have a little bit of puffy feeling in a take some time for her to work.    Dr. Stevenson had intended her to have an EMG but apparently that was never scheduled.  I did send another referral to Dr. Nolan to have this scheduled.  She should follow-up with Dr. Stevenson after that returns.    COVID-vaccine given today for the booster.    A total of 34 minutes was spent with the patient, reviewing records, tests, ordering medications, tests or procedures and documenting clinical information in the EHR.     Abiel Brambila MD     Subjective   Bernarda is a 88 year old accompanied by her her  from [facility], presenting for the following health issues:  RECHECK (Ongoing bilateral hand and finger numbness)    Nursing Notes:   Aracelis Fish LPN  1/10/2023 11:12 AM  Sign at exiting of workspace  Chief Complaint   Patient presents with     RECHECK     Ongoing bilateral hand and finger numbness   She has had  "ongoing pain that is getting worse in her hands and fingers.  Aracelis Fish LPN..................1/10/2023   11:12 AM      Initial /74   Pulse 96   Temp 98.4  F (36.9  C) (Temporal)   Resp 18   Wt 66.5 kg (146 lb 9.6 oz)   LMP  (LMP Unknown)   SpO2 98%   Breastfeeding No   BMI 28.87 kg/m   Estimated body mass index is 28.87 kg/m  as calculated from the following:    Height as of 10/17/22: 1.518 m (4' 11.75\").    Weight as of this encounter: 66.5 kg (146 lb 9.6 oz).  Medication Reconciliation: complete    FOOD SECURITY SCREENING QUESTIONS  Hunger Vital Signs:  Within the past 12 months we worried whether our food would run out before we got money to buy more. Never  Within the past 12 months the food we bought just didn't last and we didn't have money to get more. Never        Advance care directive on file? no  Advance care directive provided to patient? States she has one     Aracelis Fish LPN        History of Present Illness       Reason for visit:  Check hands    She eats 0-1 servings of fruits and vegetables daily.She consumes 1 sweetened beverage(s) daily.She exercises with enough effort to increase her heart rate 10 to 19 minutes per day.  She exercises with enough effort to increase her heart rate 3 or less days per week.   She is taking medications regularly.    Today's PHQ-9         PHQ-9 Total Score: 4    PHQ-9 Q9 Thoughts of better off dead/self-harm past 2 weeks :   Not at all    How difficult have these problems made it for you to do your work, take care of things at home, or get along with other people: Not difficult at all       Bernarda has had ongoing issues with bilateral hand numbness.  She saw Dr. Stevenson 2 months ago.  She underwent left endoscopic carpal tunnel release as well as right carpal tunnel release and ulnar nerve transposition.  She did not feel that the procedures were helpful.  He ordered a repeat EMG to evaluate this further.  I have not seen those results as " they were ordered by Dr. Stevenson.  Her previous EMG was 9/16/2021.    She has been having some burning with urination.  But has vaginal burning that is present a lot of the time.  Better with Vaseline.  No fever chills or back pain.    Patient has been considering moving to [facility] as she has family there.  Her significant other, [place], is not sure he wants to do that.  He may be moving to a place closer to his children which would be out of town.    Review of Systems   ROS is negative except as noted above             Objective    /74   Pulse 96   Temp 98.4  F (36.9  C) (Temporal)   Resp 18   Wt 66.5 kg (146 lb 9.6 oz)   LMP  (LMP Unknown)   SpO2 98%   Breastfeeding No   BMI 28.87 kg/m    Body mass index is 28.87 kg/m .  Physical Exam   Alert cooperative, no distress.  Symptoms as described above in her right arm and note from Dr. Stevenson reviewed.  Exam was not repeated today.    External pelvic exam done today showing atrophic vaginal mucosa but no suspicious lesions.    Results for orders placed or performed in visit on 01/10/23   UA reflex to Microscopic and Culture     Status: Abnormal    Specimen: Urine, NOS   Result Value Ref Range    Color Urine Yellow Colorless, Straw, Light Yellow, Yellow    Appearance Urine Slightly Cloudy (A) Clear    Glucose Urine Negative Negative mg/dL    Bilirubin Urine Negative Negative    Ketones Urine Negative Negative mg/dL    Specific Gravity Urine 1.013 1.000 - 1.030    Blood Urine Moderate (A) Negative    pH Urine 6.0 5.0 - 9.0    Protein Albumin Urine 20 (A) Negative mg/dL    Urobilinogen Urine Normal Normal, 2.0 mg/dL    Nitrite Urine Positive (A) Negative    Leukocyte Esterase Urine Large (A) Negative    Bacteria Urine Moderate (A) None Seen /HPF    WBC Clumps Urine Present (A) None Seen /HPF    RBC Urine 24 (H) <=2 /HPF    WBC Urine 146 (H) <=5 /HPF    Squamous Epithelials Urine 5 (H) <=1 /HPF    Transitional Epithelials Urine 1 <=1 /HPF     Renal Tubular Epithelials Urine 1 (H) None Seen /HPF    Hyaline Casts Urine 1 <=2 /LPF    Narrative    Urine Culture ordered based on laboratory criteria

## 2023-01-10 NOTE — PATIENT INSTRUCTIONS
"For the nerve test for the hands, they will call you. If you don't hear in the next week, call the clinic and we can check on it. Ask for Dr. Brambila's nurse Aracelis.    Use the vaginal cream twice weekly and it should help the burning.  Ok to continue the vaseline.    We put you on an antibiotic twice daily for a week for an early bladder infection.    For your toenails, call \"[address]\". [phone]                                       [address]   "

## 2023-01-10 NOTE — NURSING NOTE
"Chief Complaint   Patient presents with     RECHECK     Ongoing bilateral hand and finger numbness   She has had ongoing pain that is getting worse in her hands and fingers.  Aracelis Fish LPN..................1/10/2023   11:12 AM      Initial /74   Pulse 96   Temp 98.4  F (36.9  C) (Temporal)   Resp 18   Wt 66.5 kg (146 lb 9.6 oz)   LMP  (LMP Unknown)   SpO2 98%   Breastfeeding No   BMI 28.87 kg/m   Estimated body mass index is 28.87 kg/m  as calculated from the following:    Height as of 10/17/22: 1.518 m (4' 11.75\").    Weight as of this encounter: 66.5 kg (146 lb 9.6 oz).  Medication Reconciliation: complete    FOOD SECURITY SCREENING QUESTIONS  Hunger Vital Signs:  Within the past 12 months we worried whether our food would run out before we got money to buy more. Never  Within the past 12 months the food we bought just didn't last and we didn't have money to get more. Never        Advance care directive on file? no  Advance care directive provided to patient? States she has one     Aracelis Fish LPN  "

## 2023-01-12 ENCOUNTER — PATIENT OUTREACH (OUTPATIENT)
Dept: CARE COORDINATION | Facility: CLINIC | Age: 88
End: 2023-01-12

## 2023-01-12 LAB — BACTERIA UR CULT: ABNORMAL

## 2023-01-12 NOTE — PROGRESS NOTES
Clinic Care Coordination Contact  Care Team Conversations    Patient called to verify time and date of urology visit. The message left confused her. She stated understanding now and has ride to the visit.   No other needs at this time.  Mahi Minor RN on 1/12/2023 at 11:30 AM

## 2023-01-23 ENCOUNTER — OFFICE VISIT (OUTPATIENT)
Dept: UROLOGY | Facility: OTHER | Age: 88
End: 2023-01-23
Attending: UROLOGY
Payer: COMMERCIAL

## 2023-01-23 VITALS
BODY MASS INDEX: 28.95 KG/M2 | HEART RATE: 84 BPM | SYSTOLIC BLOOD PRESSURE: 136 MMHG | WEIGHT: 147 LBS | DIASTOLIC BLOOD PRESSURE: 78 MMHG | RESPIRATION RATE: 18 BRPM | OXYGEN SATURATION: 98 %

## 2023-01-23 DIAGNOSIS — N32.81 OVERACTIVE BLADDER: Primary | ICD-10-CM

## 2023-01-23 PROCEDURE — G0463 HOSPITAL OUTPT CLINIC VISIT: HCPCS | Mod: 25

## 2023-01-23 PROCEDURE — 51798 US URINE CAPACITY MEASURE: CPT | Performed by: UROLOGY

## 2023-01-23 PROCEDURE — 99213 OFFICE O/P EST LOW 20 MIN: CPT | Performed by: UROLOGY

## 2023-01-23 ASSESSMENT — PAIN SCALES - GENERAL: PAINLEVEL: NO PAIN (0)

## 2023-01-23 NOTE — PROGRESS NOTES
Type of Visit  EST    Chief Complaint  Urinary incontinence    HPI  Ms Ramírez is a 88 year old female who follows up with urinary incontinence.  Over 2 years ago the patient was seen for urinary retention.  She developed urinary retention as a complication from a UTI.  Following treatment of the UTI, catheter was removed and she was able to success void.  She has not previously had previous pelvic surgery.    Today she follows up with a complaint of nocturi 4x per night on average (q2 hours).  She feels rested in the am.  She does take a diuretic each morning (hydrochlorothiazide).  Nocturia is her most bothersome symtpom.  The symptoms are all stable from the last visit.  At the last visit we started a new medication: oxybutynin XL 5 mg daily.  After starting the medication she reported no changes in symptoms.  She did not experience side effects either.    She is dealing with a issue involving her left wrist after carpal tunnel surgery.  She voices a desire to keep her management as simple as possible.      Review of Systems  I personally reviewed the ROS with the patient.    Nursing Notes:   Hortencia Gayle LPN  1/23/2023 10:10 AM  Addendum  Pt presents to clinic to discuss Botox  Review of Systems:    Weight loss:    No     Recent fever/chills:  No   Night sweats:   No  Current skin rash:  No   Recent hair loss:  No  Heat intolerance:  No   Cold intolerance:  No  Chest pain:   No   Palpitations:   No  Shortness of breath:  Yes   Wheezing:   No  Constipation:    No   Diarrhea:   No   Nausea:   No   Vomiting:   No   Kidney/side pain:  No   Back pain:   No  Frequent headaches:  No   Dizziness:     No  Leg swelling:   No   Calf pain:    No    Post-Void Residual  A post-void residual was measured by ultrasonic bladder scanner.  47 mL            Physical Exam  Vitals:    01/23/23 1010   BP: 136/78   BP Location: Right arm   Patient Position: Sitting   Cuff Size: Adult Large   Pulse: 84   Resp: 18   SpO2: 98%    Weight: 66.7 kg (147 lb)   Constitutional: No acute distress.  Alert and cooperative   Pulmonary/Chest: Respirations are even and non-labored bilaterally, no audible wheezing  Abdominal: Soft. No distension, tenderness, masses or guarding.   Extremities: FRANCO x 4, Warm. No clubbing.  No cyanosis.    Skin: Pink, warm and dry.  No visible rashes noted.  Back:  No left CVA tenderness.  No right CVA tenderness.  Genitourinary: Nonpalpable bladder    Labs   03/03/22 11:02 10/17/22 11:09 01/10/23 11:28   Color Urine Light Yellow Light Yellow Yellow   Appearance Urine Clear Slightly Cloudy ! Slightly Cloudy !   Glucose Urine Negative Negative Negative   Bilirubin Urine Negative Negative Negative   Ketones Urine Negative Negative Negative   Specific Gravity Urine 1.009 1.012 1.013   pH Urine 6.0 5.5 6.0   Protein Albumin Urine Negative Negative 20 !   Urobilinogen mg/dL Normal Normal Normal   Nitrite Urine Negative Positive ! Positive !   Blood Urine Negative Trace ! Moderate !   Leukocyte Esterase Urine Negative Large ! Large !   WBC Urine  30 (H) 146 (H)   RBC Urine  5 (H) 24 (H)   Bacteria Urine  Moderate ! Moderate !   WBC Clumps  Present ! Present !   Squamous Epithelial /HPF Urine  1 5 (H)   Transitional Epi   1   Renal Tub Epi   1 (H)   Mucus Urine  Present !    Hyaline Casts   1     Post-Void Residual  A post-void residual was measured by ultrasonic bladder scanner.  47 mL today  97 mL (previously recorded)    Assessment & Plan  Ms. Ramírez is a 88 year old female who follows up with high bother nocturia.  The patient follows up to discuss alternative treatment options however on upon further discussion she is dealing with a significant issue following carpal tunnel surgery.  She would like to keep her management as simple as possible.    We did discuss Botox as a treatment option however at this time we will defer Botox as she has other health issues to attend to and would like to keep things as simple as  possible.  In addition she has had some issues with difficulty emptying and this could certainly recur with Botox treatment.    Discontinue oxybutynin due to lack of benefit.    Patient will follow-up as needed in the future if her symptoms worsen or if she desires additional treatment

## 2023-01-23 NOTE — NURSING NOTE
Pt presents to clinic to discuss Botox  Review of Systems:    Weight loss:    No     Recent fever/chills:  No   Night sweats:   No  Current skin rash:  No   Recent hair loss:  No  Heat intolerance:  No   Cold intolerance:  No  Chest pain:   No   Palpitations:   No  Shortness of breath:  Yes   Wheezing:   No  Constipation:    No   Diarrhea:   No   Nausea:   No   Vomiting:   No   Kidney/side pain:  No   Back pain:   No  Frequent headaches:  No   Dizziness:     No  Leg swelling:   No   Calf pain:    No    Post-Void Residual  A post-void residual was measured by ultrasonic bladder scanner.  47 mL

## 2023-01-24 ENCOUNTER — PATIENT OUTREACH (OUTPATIENT)
Dept: CARE COORDINATION | Facility: CLINIC | Age: 88
End: 2023-01-24
Payer: COMMERCIAL

## 2023-01-24 NOTE — PROGRESS NOTES
Clinic Care Coordination Contact  Care Team Conversations    Patient calls to say she is struggling with pain in her hands and wrists. She is wondering why she never heard more from Dr. Stevenson on next steps. Care coordinator saw that referral was placed to [facility] neurology for EMG.  She says no one called to schedule. Care coordinator called [facility] about getting her scheduled for EMG. Their office shows they attempted to reach patient unsuccessfully    Plan:  Care coordinator scheduled patient for EMG on 2-13-23 at 12:30pm. Informed patient. She was pleased with this, and will schedule a ride with Elder [facility] [place]. Nothing else at this time.  Mahi Minor, RN on 1/24/2023 at 3:25 PM

## 2023-01-28 ENCOUNTER — HEALTH MAINTENANCE LETTER (OUTPATIENT)
Age: 88
End: 2023-01-28

## 2023-02-13 ENCOUNTER — OFFICE VISIT (OUTPATIENT)
Dept: NEUROLOGY | Facility: OTHER | Age: 88
End: 2023-02-13
Attending: PSYCHIATRY & NEUROLOGY
Payer: MEDICARE

## 2023-02-13 VITALS
OXYGEN SATURATION: 99 % | HEART RATE: 116 BPM | HEIGHT: 60 IN | RESPIRATION RATE: 18 BRPM | WEIGHT: 146 LBS | DIASTOLIC BLOOD PRESSURE: 80 MMHG | TEMPERATURE: 98.2 F | BODY MASS INDEX: 28.66 KG/M2 | SYSTOLIC BLOOD PRESSURE: 148 MMHG

## 2023-02-13 DIAGNOSIS — G56.11 RIGHT MEDIAN NERVE NEUROPATHY: ICD-10-CM

## 2023-02-13 DIAGNOSIS — G56.12 LEFT MEDIAN NERVE NEUROPATHY: Primary | ICD-10-CM

## 2023-02-13 DIAGNOSIS — G56.23 LESIONS OF BOTH ULNAR NERVES: ICD-10-CM

## 2023-02-13 PROCEDURE — G0463 HOSPITAL OUTPT CLINIC VISIT: HCPCS | Mod: 25

## 2023-02-13 PROCEDURE — G0463 HOSPITAL OUTPT CLINIC VISIT: HCPCS

## 2023-02-13 PROCEDURE — 95886 MUSC TEST DONE W/N TEST COMP: CPT | Performed by: PSYCHIATRY & NEUROLOGY

## 2023-02-13 PROCEDURE — 99215 OFFICE O/P EST HI 40 MIN: CPT | Mod: 25 | Performed by: PSYCHIATRY & NEUROLOGY

## 2023-02-13 PROCEDURE — 95913 NRV CNDJ TEST 13/> STUDIES: CPT | Performed by: PSYCHIATRY & NEUROLOGY

## 2023-02-13 PROCEDURE — 95913 NRV CNDJ TEST 13/> STUDIES: CPT | Mod: 26 | Performed by: PSYCHIATRY & NEUROLOGY

## 2023-02-13 PROCEDURE — 95886 MUSC TEST DONE W/N TEST COMP: CPT | Mod: 26 | Performed by: PSYCHIATRY & NEUROLOGY

## 2023-02-13 ASSESSMENT — PAIN SCALES - GENERAL: PAINLEVEL: WORST PAIN (10)

## 2023-02-13 NOTE — PROGRESS NOTES
Visit Date: 02/13/2023    NEURODIAGNOSTICS CONSULTATION     HISTORY OF PRESENT ILLNESS:  The patient is an 88-year-old who is very angry because a year and a half ago, she was diagnosed with very severe right carpal tunnel syndrome and cubital tunnel syndrome as well as significant left carpal tunnel syndrome.  She underwent surgery a few weeks later, but says that she did not see any improvement in any of her symptoms and in fact says that she is doing worse now than ever.  Her current symptoms consist of pain in the hands, but she does not localize this or characterize it precisely.  She believes there is weakness bilaterally, but is also vague with regard to that complaint.  Some of her discomfort sounds to be arthritis related.  She does describe pain in her finger joints bilaterally.    PHYSICAL EXAMINATION:  Reveals a very elderly woman.  She is afebrile with a blood pressure of 148/80.  There is prominent weakness for the intrinsic hand muscles, especially on the right.  Proximal strength is comparatively preserved.  The reflexes are hypoactive.  Gait is extremely cautious with a short stride and en bloc turns.    NERVE CONDUCTION STUDIES:  Motor nerve conduction testing was performed bilaterally to the median and ulnar nerves.  There was severe latency prolongation, amplitude reduction and slowing at the wrist for the right median nerve with mild latency prolongation and slowing for the left median nerve.  Both ulnar nerves showed mild slowing at the elbow, but there was moderately severe amplitude reduction of the waveforms on the right side.  H reflex latency was normal for both median nerves, but prolonged for the left ulnar nerve, consistent with conduction block at the elbow.  The right ulnar H reflex was not defined, also suggestive of conduction block at the elbow.    Orthodromic mixed conduction studies were performed bilaterally for the median and ulnar nerves and antidromic sensory nerve conduction  studies were performed for the superficial radial nerves.  The ulnar and radial studies were within normal limits, but no waveform could be obtained for the right median nerve.  The left median nerve showed mild latency prolongation and mild to moderate slowing through the wrist.    IMPRESSION:  The patient's presentation is complicated.  She has findings related to bilateral carpal tunnel syndrome and bilateral cubital tunnel syndrome.  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  At that time, it was my impression that she was unlikely to see much improvement based on the severe state of the median nerve.  I would say that the degree of recovery is about as would have been expected.  It is unlikely that she would benefit from any additional intervention.  There has been considerable improvement in left median nerve function and there is now only mild slowing and conduction block at the wrist.  The findings are not suspicious for ongoing left carpal tunnel syndrome.    As for the right ulnar neuropathy, there has been quite a bit of improvement in function, especially with regard to conduction velocity across the elbow.  That said, the ulnar nerve is currently passing within the ulnar sulcus of the elbow, so if it was transposed it has migrated back into the ulnar sulcus and there is conduction block at that location.  Conceivably, the patient would benefit from repeat ulnar nerve transposition on the right.  On the left side, the findings related to ulnar neuropathy at the elbow were mild in 2021 and they are essentially unchanged today.    The findings were discussed with the patient at length.    Reji Nolan MD        D: 2023   T: 2023   MT: [id number]    Name:     JAY MALONE  MRN:      [id number]        Account:    [id number]   :      10/07/1934           Visit Date: 2023      Document: [id number]    cc:  Bandar Stevenson MD

## 2023-02-13 NOTE — LETTER
2/13/2023         RE: Bernarda Ramírez  [address] [place] [address]        Dear Colleague,    Thank you for referring your patient, Bernarda Ramírez, to the [facility]. Please see a copy of my visit note below.    Visit Date: 02/13/2023    NEURODIAGNOSTICS CONSULTATION     HISTORY OF PRESENT ILLNESS:  The patient is an 88-year-old who is very angry because a year and a half ago, she was diagnosed with very severe right carpal tunnel syndrome and cubital tunnel syndrome as well as significant left carpal tunnel syndrome.  She underwent surgery a few weeks later, but says that she did not see any improvement in any of her symptoms and in fact says that she is doing worse now than ever.  Her current symptoms consist of pain in the hands, but she does not localize this or characterize it precisely.  She believes there is weakness bilaterally, but is also vague with regard to that complaint.  Some of her discomfort sounds to be arthritis related.  She does describe pain in her finger joints bilaterally.    PHYSICAL EXAMINATION:  Reveals a very elderly woman.  She is afebrile with a blood pressure of 148/80.  There is prominent weakness for the intrinsic hand muscles, especially on the right.  Proximal strength is comparatively preserved.  The reflexes are hypoactive.  Gait is extremely cautious with a short stride and en bloc turns.    NERVE CONDUCTION STUDIES:  Motor nerve conduction testing was performed bilaterally to the median and ulnar nerves.  There was severe latency prolongation, amplitude reduction and slowing at the wrist for the right median nerve with mild latency prolongation and slowing for the left median nerve.  Both ulnar nerves showed mild slowing at the elbow, but there was moderately severe amplitude reduction of the waveforms on the right side.  H reflex latency was normal for both median nerves, but prolonged for the left ulnar nerve, consistent with conduction block at  the elbow.  The right ulnar H reflex was not defined, also suggestive of conduction block at the elbow.    Orthodromic mixed conduction studies were performed bilaterally for the median and ulnar nerves and antidromic sensory nerve conduction studies were performed for the superficial radial nerves.  The ulnar and radial studies were within normal limits, but no waveform could be obtained for the right median nerve.  The left median nerve showed mild latency prolongation and mild to moderate slowing through the wrist.    IMPRESSION:  The patient's presentation is complicated.  She has findings related to bilateral carpal tunnel syndrome and bilateral cubital tunnel syndrome.  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  At that time, it was my impression that she was unlikely to see much improvement based on the severe state of the median nerve.  I would say that the degree of recovery is about as would have been expected.  It is unlikely that she would benefit from any additional intervention.  There has been considerable improvement in left median nerve function and there is now only mild slowing and conduction block at the wrist.  The findings are not suspicious for ongoing left carpal tunnel syndrome.    As for the right ulnar neuropathy, there has been quite a bit of improvement in function, especially with regard to conduction velocity across the elbow.  That said, the ulnar nerve is currently passing within the ulnar sulcus of the elbow, so if it was transposed it has migrated back into the ulnar sulcus and there is conduction block at that location.  Conceivably, the patient would benefit from repeat ulnar nerve transposition on the right.  On the left side, the findings related to ulnar neuropathy at the elbow were mild in 09/2021 and they are essentially unchanged today.    The findings were discussed  with the patient at length.    Reji Ndiaye MD        D: 2023   T: 2023   MT: [id number]    Name:     JAY MALONE  MRN:      [id number]        Account:    [id number]   :      10/07/1934           Visit Date: 2023     Document: [id number]    cc:  Bandar Stevenson MD       Again, thank you for allowing me to participate in the care of your patient.        Sincerely,        Reji Ndiaye MD

## 2023-02-13 NOTE — NURSING NOTE
Patient is here for EMG for BUE for bilateral hand pain.     No LMP recorded (lmp unknown). Patient is postmenopausal.  Medication Reconciliation: complete      Shayy Son LPN 2/13/2023 12:38 PM       Advance care directive on file? no  Advance care directive provided to patient? no       Shayy Son LPN

## 2023-02-17 ENCOUNTER — PATIENT OUTREACH (OUTPATIENT)
Dept: CARE COORDINATION | Facility: CLINIC | Age: 88
End: 2023-02-17
Payer: COMMERCIAL

## 2023-02-17 NOTE — PROGRESS NOTES
Clinic Care Coordination Contact  Care Team Conversations    I received a call from Bernarda, requesting assistance setting up an appt with Dr. Stevenson, and setting up a ride with samuel for appt. She would like writer to call back around 3 pm today as will be busy the rest of the day. She is also ok with a phone call on Monday. She reports anytime next week works for her for scheduling the appt.       Coordination with [address] scheduling, information provided for patient appt request, and per her request, I asked them to call after 3 PM today. Once appt is scheduled, will call [facility] to schedule transportation.     Plan: Will review either later today or Monday when appt has been made, will follow up with patient and schedule transportation with .       Jalyn Horn RN on 2/17/2023 at 8:17 AM      Appt with Dr. Stevenson for 3.3.2023 @ 1:00pm. Patient will arrange transportation with Samuel.     Jalyn Horn RN on 2/20/2023 at 9:53 AM

## 2023-03-01 ENCOUNTER — PATIENT OUTREACH (OUTPATIENT)
Dept: CARE COORDINATION | Facility: CLINIC | Age: 88
End: 2023-03-01
Payer: COMMERCIAL

## 2023-03-01 NOTE — PROGRESS NOTES
Clinic Care Coordination Contact    Follow Up Progress Note      Assessment: Patient is with daughterMaria.  Will be staying between her four daughters until she gets into an assisted living if they decide that.  She agrees she can not live alone so remotely, with her roommate no longer there. Has upcoming visit about wrist pain with Dr. Stevenson. Otherwise is considering botox treatment for nocturia. Will need to consult with Dr. Thibodeaux to finalize a plan.     Care Gaps:    Health Maintenance Due   Topic Date Due     MICROALBUMIN  Never done     ZOSTER IMMUNIZATION (1 of 2) Never done     Pneumococcal Vaccine: 65+ Years (2 - PCV) 06/17/2004     DTAP/TDAP/TD IMMUNIZATION (2 - Td or Tdap) 08/27/2017     MEDICARE ANNUAL WELLNESS VISIT  08/19/2022     LIPID  08/19/2022     HEMOGLOBIN  05/04/2023     Intervention/Education provided during outreach: Urology appointment needed.      Plan:   CC talked with urology office and she will need to be seen to discuss whether botox treatment is the right option for her. She gave permission to schedule this as needed. No other needs right now.   Care Coordinator will follow up with plan for this.   Mahi Minor RN on 3/1/2023 at 5:15 PM

## 2023-03-03 ENCOUNTER — OFFICE VISIT (OUTPATIENT)
Dept: ORTHOPEDICS | Facility: OTHER | Age: 88
End: 2023-03-03
Attending: SPECIALIST
Payer: MEDICARE

## 2023-03-03 DIAGNOSIS — R20.0 BILATERAL HAND NUMBNESS: Primary | ICD-10-CM

## 2023-03-03 PROCEDURE — G0463 HOSPITAL OUTPT CLINIC VISIT: HCPCS

## 2023-03-03 PROCEDURE — 250N000011 HC RX IP 250 OP 636: Performed by: SPECIALIST

## 2023-03-03 PROCEDURE — 250N000009 HC RX 250: Performed by: SPECIALIST

## 2023-03-03 PROCEDURE — 20526 THER INJECTION CARP TUNNEL: CPT | Mod: 50,58 | Performed by: SPECIALIST

## 2023-03-03 PROCEDURE — 20526 THER INJECTION CARP TUNNEL: CPT | Mod: 50 | Performed by: SPECIALIST

## 2023-03-03 RX ORDER — METHYLPREDNISOLONE ACETATE 40 MG/ML
40 INJECTION, SUSPENSION INTRA-ARTICULAR; INTRALESIONAL; INTRAMUSCULAR; SOFT TISSUE ONCE
Status: COMPLETED | OUTPATIENT
Start: 2023-03-03 | End: 2023-03-03

## 2023-03-03 RX ORDER — BUPIVACAINE HYDROCHLORIDE 5 MG/ML
1 INJECTION, SOLUTION EPIDURAL; INTRACAUDAL ONCE
Status: COMPLETED | OUTPATIENT
Start: 2023-03-03 | End: 2023-03-03

## 2023-03-03 RX ADMIN — METHYLPREDNISOLONE ACETATE 40 MG: 40 INJECTION, SUSPENSION INTRA-ARTICULAR; INTRALESIONAL; INTRAMUSCULAR; INTRASYNOVIAL; SOFT TISSUE at 13:19

## 2023-03-03 RX ADMIN — METHYLPREDNISOLONE ACETATE 40 MG: 40 INJECTION, SUSPENSION INTRA-ARTICULAR; INTRALESIONAL; INTRAMUSCULAR; INTRASYNOVIAL; SOFT TISSUE at 13:34

## 2023-03-03 RX ADMIN — BUPIVACAINE HYDROCHLORIDE 5 MG: 5 INJECTION, SOLUTION EPIDURAL; INTRACAUDAL; PERINEURAL at 13:18

## 2023-03-03 RX ADMIN — BUPIVACAINE HYDROCHLORIDE 5 MG: 5 INJECTION, SOLUTION EPIDURAL; INTRACAUDAL; PERINEURAL at 13:34

## 2023-03-03 NOTE — PROGRESS NOTES
Visit Date: 2023    HISTORY OF PRESENT ILLNESS:  Bernarda Ramírez returns for followup.  Her repeat EMG performed by Dr. Nolan on 2023 showed that her carpal tunnel symptoms have improved significantly.  She concurrently is having ongoing discomfort in the median nerve distribution.  We discussed having her come in to have both carpal canals injected.    PHYSICAL EXAMINATION:  Examination today confirms her incisions to have healed nicely.  Her digital range of motion is well preserved.    IMPRESSION:  Status post bilateral carpal tunnel release with ulnar nerve transposition on the right.     PLAN:  At this point, we discussed proceeding with repeat injections of both carpal canals.  A sterile prep was performed and each was injected with 40 mg of Depo-Medrol and 1 mL of 0.5% Marcaine without epinephrine.  There were no complications. Our plan will be to follow her clinically and see how she does.  After a period of observation, she reported significant improvement.    Bandar Stevenson MD        D: 2023   T: 2023   MT: GALLITO    Name:     BERNARDA RAMÍREZ  MRN:      [id number]        Account:    [id number]   :      10/07/1934           Visit Date: 2023     Document: [id number]

## 2023-03-03 NOTE — PROGRESS NOTES
Patient is here for follow up on her wrist pain.  Lucille Cifuentes LPN .....................3/3/2023 1:11 PM

## 2023-03-06 ENCOUNTER — PATIENT OUTREACH (OUTPATIENT)
Dept: CARE COORDINATION | Facility: CLINIC | Age: 88
End: 2023-03-06
Payer: COMMERCIAL

## 2023-03-06 NOTE — PROGRESS NOTES
Clinic Care Coordination Contact    Follow Up Progress Note      Assessment: Patient is hard of hearing and had her daughter,Heather, call to change her address and give another phone number to call for her. She has made a permanent move to be near her daughters and sister. For a time she will continue her care her and transition when she finds a provider in [facility].     She is doing okay, but somewhat sad about all the changes since her friend/roommate, Kolton, passed last week.     Care Gaps:    Health Maintenance Due   Topic Date Due     MICROALBUMIN  Never done     ZOSTER IMMUNIZATION (1 of 2) Never done     Pneumococcal Vaccine: 65+ Years (2 - PCV) 06/17/2004     DTAP/TDAP/TD IMMUNIZATION (2 - Td or Tdap) 08/27/2017     MEDICARE ANNUAL WELLNESS VISIT  08/19/2022     LIPID  08/19/2022     HEMOGLOBIN  05/04/2023       Intervention/Education provided during outreach: Changes made in chart as requested by patient and daughter, Heather.     Gave daughter, Heather, the senior linkage line as an additional resource should they need services or have questions.     Plan:   Care coordination will move her into maintenance until transition to another clinic is made. Let them know they can call if they need recommendations or help transitioning.   Mahi Minor, RN on 3/6/2023 at 11:09 AM

## 2023-05-01 ENCOUNTER — PATIENT OUTREACH (OUTPATIENT)
Dept: CARE COORDINATION | Facility: CLINIC | Age: 88
End: 2023-05-01
Payer: COMMERCIAL

## 2023-05-01 NOTE — PROGRESS NOTES
Clinic Care Coordination Contact    Situation: Patient chart reviewed by care coordinator.    Background: Patient moved to live with her daughters after her roommate . She will likely establish nearer her new home.     Plan/Recommendations: Care coordinator will remove from panel.  She understood she is always welcome to call if she still needs assistance or comes back to the area.  Mahi Minor RN on 2023 at 4:26 PM

## 2023-05-09 ENCOUNTER — TELEPHONE (OUTPATIENT)
Dept: FAMILY MEDICINE | Facility: OTHER | Age: 88
End: 2023-05-09
Payer: COMMERCIAL

## 2023-05-09 NOTE — TELEPHONE ENCOUNTER
[facility] pharmacy Is looking to get scripts to fill    Please call and advise    Thank You      Love Huber on 5/9/2023 at 1:58 PM

## 2023-05-09 NOTE — TELEPHONE ENCOUNTER
I called the pharmacy and they stated that they got what they were looking for so they do not need anything now.  Aracelis Fish LPN..................5/9/2023   2:07 PM

## 2024-02-24 ENCOUNTER — HEALTH MAINTENANCE LETTER (OUTPATIENT)
Age: 89
End: 2024-02-24

## 2025-03-09 ENCOUNTER — HEALTH MAINTENANCE LETTER (OUTPATIENT)
Age: OVER 89
End: 2025-03-09

## 2025-06-30 NOTE — PROGRESS NOTES
Clinic Care Coordination Contact    Follow Up Progress Note      Assessment: Patient is in clinic for follow-up on wrist/hand pain and burning with urination. Elder [facility] [place] got her here today. Reports that she and significant other have services in place to help at home through [facility], which has helped them to stay safely in home this winter. Also children have visited around holidays and helped with some projects. She is considering an eventual move to someplace like [place]. Not quite ready yet.     Care Gaps:    Health Maintenance Due   Topic Date Due     MICROALBUMIN  Never done     ZOSTER IMMUNIZATION (1 of 2) Never done     Pneumococcal Vaccine: 65+ Years (2 - PCV) 06/17/2004     DTAP/TDAP/TD IMMUNIZATION (2 - Td or Tdap) 08/27/2017     COVID-19 Vaccine (5 - Booster for Pfizer series) 07/01/2022     MEDICARE ANNUAL WELLNESS VISIT  08/19/2022     LIPID  08/19/2022       Intervention/Education provided during outreach: Encouraged continued use of resources, offered support.        Plan:  Reassuring that they are utilizing the resources connected to at [facility]. Right now she does not need help moving forward with any transitions to [facility].  She understands she can call anytime.  Care Coordinator will follow up in 2 months or sooner if needed.   Mahi Minor RN on 1/10/2023 at 11:33 AM     Post procedure

## (undated) DEVICE — TUBING IRR TUR Y TYPE 2C4041

## (undated) DEVICE — BNDG ELASTIC 2"X5YDS UNSTERILE 6611-20

## (undated) DEVICE — DRSG TELFA 3X8" 1238

## (undated) DEVICE — GLOVE PROTEXIS POWDER FREE SMT 8.5 2D72PT85X

## (undated) DEVICE — NDL SPINAL 20GA 3.5" 405182

## (undated) DEVICE — DRSG GAUZE 4X4" 3033

## (undated) DEVICE — ANTIFOG SOLUTION W/FOAM PAD CF-1001

## (undated) DEVICE — PACK MAJOR EXTREMITY SOP15MEFCA

## (undated) DEVICE — LIGHT HANDLES PLASTIC

## (undated) DEVICE — STPL SKIN 35W 6.9MM  PXW35

## (undated) DEVICE — PANTIES MESH LG/XLG 2PK 706M2

## (undated) DEVICE — DRSG GAUZE 4X4" TRAY 6939

## (undated) DEVICE — CAST PADDING 4" COTTON WEBRIL UNSTERILE 9084

## (undated) DEVICE — SU VICRYL 2-0 CT-2 27" UND J269H

## (undated) DEVICE — PAD CHUX UNDERPAD 30X36" P3036C

## (undated) DEVICE — DRSG XEROFORM 1X8"

## (undated) DEVICE — BNDG ELASTIC 4"X5YDS UNSTERILE 6611-40

## (undated) DEVICE — CAST PADDING-STERILE 2"

## (undated) DEVICE — ESU GROUND PAD ADULT W/CORD E7507

## (undated) DEVICE — BLADE CARPAL TUNNEL ENDO 81010-1

## (undated) DEVICE — SU ETHILON 4-0 FS-2 18" 662H

## (undated) DEVICE — STRAP STIRRUP W/O SLIP 30187-020

## (undated) DEVICE — PACK LITHOTOMY SBA15LIFCA

## (undated) DEVICE — VESSEL LOOPS RED MAXI

## (undated) DEVICE — SOL WATER 1500ML

## (undated) DEVICE — PAD PERI INDIV WRAP 11" 2022A

## (undated) DEVICE — PREP CHLORAPREP 26ML TINTED ORANGE  260815

## (undated) DEVICE — COVER LIGHT HANDLE LT-F02

## (undated) DEVICE — Device

## (undated) DEVICE — TOURNIQUET SGL BLADDER 18"X4" RED 5921-218-135

## (undated) DEVICE — SYR 10ML FINGER CONTROL W/O NDL 309695

## (undated) DEVICE — DRAPE STERI TOWEL LG 1010

## (undated) DEVICE — PREP CHLORAPREP CLEAR 26ML APPLICATOR 260800

## (undated) DEVICE — GLOVE SENSICARE 8.5 MSG1085 LATEX FREE

## (undated) DEVICE — GLOVE PROTEXIS POWDER FREE SMT 7.5  2D72PT75X

## (undated) RX ORDER — BUPIVACAINE HYDROCHLORIDE 5 MG/ML
INJECTION, SOLUTION EPIDURAL; INTRACAUDAL
Status: DISPENSED
Start: 2022-01-07

## (undated) RX ORDER — ONDANSETRON 2 MG/ML
INJECTION INTRAMUSCULAR; INTRAVENOUS
Status: DISPENSED
Start: 2022-03-09

## (undated) RX ORDER — BUPIVACAINE HYDROCHLORIDE 5 MG/ML
INJECTION, SOLUTION EPIDURAL; INTRACAUDAL
Status: DISPENSED
Start: 2023-03-03

## (undated) RX ORDER — LIDOCAINE HYDROCHLORIDE 20 MG/ML
INJECTION, SOLUTION EPIDURAL; INFILTRATION; INTRACAUDAL; PERINEURAL
Status: DISPENSED
Start: 2022-03-09

## (undated) RX ORDER — LIDOCAINE HYDROCHLORIDE 20 MG/ML
INJECTION, SOLUTION EPIDURAL; INFILTRATION; INTRACAUDAL; PERINEURAL
Status: DISPENSED
Start: 2022-05-13

## (undated) RX ORDER — CEFTRIAXONE SODIUM 1 G
VIAL (EA) INJECTION
Status: DISPENSED
Start: 2021-08-20

## (undated) RX ORDER — METHYLPREDNISOLONE ACETATE 40 MG/ML
INJECTION, SUSPENSION INTRA-ARTICULAR; INTRALESIONAL; INTRAMUSCULAR; SOFT TISSUE
Status: DISPENSED
Start: 2023-03-03

## (undated) RX ORDER — KETOROLAC TROMETHAMINE 30 MG/ML
INJECTION, SOLUTION INTRAMUSCULAR; INTRAVENOUS
Status: DISPENSED
Start: 2022-03-09

## (undated) RX ORDER — PROPOFOL 10 MG/ML
INJECTION, EMULSION INTRAVENOUS
Status: DISPENSED
Start: 2021-11-12

## (undated) RX ORDER — ONDANSETRON 4 MG/1
TABLET, ORALLY DISINTEGRATING ORAL
Status: DISPENSED
Start: 2021-08-20

## (undated) RX ORDER — FENTANYL CITRATE 50 UG/ML
INJECTION, SOLUTION INTRAMUSCULAR; INTRAVENOUS
Status: DISPENSED
Start: 2022-03-09

## (undated) RX ORDER — LIDOCAINE HYDROCHLORIDE 10 MG/ML
INJECTION, SOLUTION EPIDURAL; INFILTRATION; INTRACAUDAL; PERINEURAL
Status: DISPENSED
Start: 2021-08-20

## (undated) RX ORDER — ACETAMINOPHEN 325 MG/1
TABLET ORAL
Status: DISPENSED
Start: 2022-03-09

## (undated) RX ORDER — TRIAMCINOLONE ACETONIDE 40 MG/ML
INJECTION, SUSPENSION INTRA-ARTICULAR; INTRAMUSCULAR
Status: DISPENSED
Start: 2022-01-07

## (undated) RX ORDER — LIDOCAINE HYDROCHLORIDE 10 MG/ML
INJECTION, SOLUTION EPIDURAL; INFILTRATION; INTRACAUDAL; PERINEURAL
Status: DISPENSED
Start: 2021-11-12

## (undated) RX ORDER — SODIUM CHLORIDE 9 MG/ML
INJECTION, SOLUTION INTRAVENOUS
Status: DISPENSED
Start: 2020-07-28

## (undated) RX ORDER — CEFTRIAXONE SODIUM 1 G/50ML
INJECTION, SOLUTION INTRAVENOUS
Status: DISPENSED
Start: 2020-07-28

## (undated) RX ORDER — PROPOFOL 10 MG/ML
INJECTION, EMULSION INTRAVENOUS
Status: DISPENSED
Start: 2022-03-09

## (undated) RX ORDER — CEFAZOLIN SODIUM 2 G/100ML
INJECTION, SOLUTION INTRAVENOUS
Status: DISPENSED
Start: 2021-11-12

## (undated) RX ORDER — ONDANSETRON 2 MG/ML
INJECTION INTRAMUSCULAR; INTRAVENOUS
Status: DISPENSED
Start: 2020-07-28

## (undated) RX ORDER — LIDOCAINE HYDROCHLORIDE 10 MG/ML
INJECTION, SOLUTION INFILTRATION; PERINEURAL
Status: DISPENSED
Start: 2022-01-07

## (undated) RX ORDER — PROPOFOL 10 MG/ML
INJECTION, EMULSION INTRAVENOUS
Status: DISPENSED
Start: 2022-05-13

## (undated) RX ORDER — LORAZEPAM 2 MG/ML
INJECTION INTRAMUSCULAR
Status: DISPENSED
Start: 2020-07-28